# Patient Record
Sex: MALE | Race: WHITE | NOT HISPANIC OR LATINO | Employment: FULL TIME | URBAN - METROPOLITAN AREA
[De-identification: names, ages, dates, MRNs, and addresses within clinical notes are randomized per-mention and may not be internally consistent; named-entity substitution may affect disease eponyms.]

---

## 2017-01-26 ENCOUNTER — OFFICE VISIT (OUTPATIENT)
Dept: FAMILY MEDICINE | Facility: CLINIC | Age: 27
End: 2017-01-26
Payer: COMMERCIAL

## 2017-01-26 VITALS
TEMPERATURE: 98.4 F | HEART RATE: 78 BPM | SYSTOLIC BLOOD PRESSURE: 137 MMHG | BODY MASS INDEX: 28.31 KG/M2 | WEIGHT: 209 LBS | HEIGHT: 72 IN | DIASTOLIC BLOOD PRESSURE: 82 MMHG | OXYGEN SATURATION: 98 %

## 2017-01-26 DIAGNOSIS — F41.8 DEPRESSION WITH ANXIETY: Primary | ICD-10-CM

## 2017-01-26 DIAGNOSIS — R04.0 EPISTAXIS: ICD-10-CM

## 2017-01-26 PROCEDURE — 99213 OFFICE O/P EST LOW 20 MIN: CPT | Performed by: FAMILY MEDICINE

## 2017-01-26 RX ORDER — BUPROPION HYDROCHLORIDE 150 MG/1
TABLET ORAL
Qty: 53 TABLET | Refills: 1 | Status: SHIPPED | OUTPATIENT
Start: 2017-01-26 | End: 2017-03-23 | Stop reason: DRUGHIGH

## 2017-01-26 RX ORDER — ESCITALOPRAM OXALATE 20 MG/1
20 TABLET ORAL DAILY
Qty: 90 TABLET | Status: CANCELLED | OUTPATIENT
Start: 2017-01-26

## 2017-01-26 NOTE — NURSING NOTE
Chief Complaint   Patient presents with     Depression     follow up     Nose Problem     right nostril feels plugged and then bleeds from scab      /82 mmHg  Pulse 78  Temp(Src) 98.4  F (36.9  C) (Oral)  Ht 6' (1.829 m)  Wt 209 lb (94.802 kg)  BMI 28.34 kg/m2  SpO2 98% Estimated body mass index is 28.34 kg/(m^2) as calculated from the following:    Height as of this encounter: 6' (1.829 m).    Weight as of this encounter: 209 lb (94.802 kg).  BP completed using cuff size: large       Health Maintenance due pending provider review:  PHQ9    PHQ/ACT/JELANI--Gave pt questionnare      Constance Gay CMA

## 2017-01-26 NOTE — PROGRESS NOTES
"Subjective:   Adam is a 26 year old male with past medical history of depression with anxiety who presents to clinic today to discuss medication options for depression and discuss recurrent bleeding from his right nostril.     Adam was previously taking escitalopram 2015 through 2016. He stopped taking the escitalopram 5 months ago. Reasons for stopping was that he didn't feel motivated to return for a refill. Over the holidays he started to have worsening depression and mood. He has been feeling \"more down\" lately and would like to get back on medication for depression. Today his PHQ9 score is 9. He did not like the sexual dysfunction side effects of escitalopram and felt it made his mood \"dull.\" Would like to try a different medication option besides escitalopram.  He had tried working with a therapist in the past, but stopped going because he did not feel a connection. He is interested in trying therapy again in the spring. No suicidal ideation or thoughts of harming himself.     Adam also has had on and off bleeding from his right nostril for the past 2 years. In 2015 he was hit in the nose with a basketball. Did not notice any fracture of nose, but has had recurrent bleeding since then. The bleeding is worse in the winter/dry weather. The bleeding will last for approx one minute and then seems to scab over and stop bleeding. Blowing his nose will then cause bleeding. No bleeding from the left nostril. Often has metallic taste in the back of his throat but has not noticed blood. Less frequent bleeding in the summer.     No chills of fevers or recent illness.      Objective:  /82 mmHg  Pulse 78  Temp(Src) 98.4  F (36.9  C) (Oral)  Ht 6' (1.829 m)  Wt 209 lb (94.802 kg)  BMI 28.34 kg/m2  SpO2 98%   Physical exam:   General: alert, in no apparent distress  Head: atraumatic  Eyes: extraocular muscles intact. Pupil equal, round, and reactive to light  Ears: canals clear, TM pearly grey and intact. " "No drainage  Nose: no discharge, no scabs. No visibly bleeding areas.   Throat: no oral lesions, non erythematous. No blood.       Assessment/Plan:   1. Depression   Patient has history of depression previously treated with escitalopram, although he has not taken it in 5 months. Recently over the holidays he has felt his mood is down. The escitalopram seemed to help his moods, however he did not like the sexual dysfunction side effects or feeling \"dull.\" He is interested in trying bupropion to increase his moods. No suicidal ideation. PHQ9 score today 9. Feels his motivation is also decreased. Patient is interested in starting therapy later in the spring (he has a trip planned for this spring).   - bupropion 150 mg once a day for one week, then increase to 1500 mg twice daily  - see back in one month to assess bupropion on mood  - information on therapists     2. Recurrent epistaxis of right nostril   Two year history of recurrent bleeding from right nostril following injury during a basketball game. No issues with left nostril. Bleeding worse in winter and dry weather. Less bleeding in the summer. Bleeding lasts only a minute then seems to scab over. No history of fracture to nose. Patient will try applying anti-biotic ointment to affected area during winter until spring to help that area heal. He will also try a humidifier in his room at night.   - anti-bacterial ointment applied to nose during this winter until spring  - humidifier in room at night   - referral to ENT if needed        This note is scribed by Fox Ceron MS3 on behalf of Dr. Eddy.     The medical student has acted as my scribe.  I have completed all components of the history, physical exam and assessment and plan and agree with the note as documented.    "

## 2017-01-27 ASSESSMENT — PATIENT HEALTH QUESTIONNAIRE - PHQ9: SUM OF ALL RESPONSES TO PHQ QUESTIONS 1-9: 9

## 2017-03-23 ENCOUNTER — OFFICE VISIT (OUTPATIENT)
Dept: FAMILY MEDICINE | Facility: CLINIC | Age: 27
End: 2017-03-23
Payer: COMMERCIAL

## 2017-03-23 VITALS
BODY MASS INDEX: 27.77 KG/M2 | HEART RATE: 66 BPM | HEIGHT: 72 IN | SYSTOLIC BLOOD PRESSURE: 140 MMHG | OXYGEN SATURATION: 98 % | WEIGHT: 205 LBS | TEMPERATURE: 98 F | RESPIRATION RATE: 14 BRPM | DIASTOLIC BLOOD PRESSURE: 84 MMHG

## 2017-03-23 DIAGNOSIS — J40 BRONCHITIS: ICD-10-CM

## 2017-03-23 DIAGNOSIS — R04.0 EPISTAXIS: ICD-10-CM

## 2017-03-23 DIAGNOSIS — F41.8 DEPRESSION WITH ANXIETY: Primary | ICD-10-CM

## 2017-03-23 PROCEDURE — 99214 OFFICE O/P EST MOD 30 MIN: CPT | Performed by: FAMILY MEDICINE

## 2017-03-23 RX ORDER — ALBUTEROL SULFATE 90 UG/1
2 AEROSOL, METERED RESPIRATORY (INHALATION) EVERY 6 HOURS PRN
Qty: 1 INHALER | Refills: 0 | Status: SHIPPED | OUTPATIENT
Start: 2017-03-23 | End: 2018-07-20

## 2017-03-23 RX ORDER — BUPROPION HYDROCHLORIDE 150 MG/1
TABLET ORAL
Qty: 53 TABLET | Refills: 1 | Status: CANCELLED | OUTPATIENT
Start: 2017-03-23

## 2017-03-23 RX ORDER — BUPROPION HYDROCHLORIDE 300 MG/1
300 TABLET ORAL EVERY MORNING
Qty: 90 TABLET | Refills: 3 | Status: SHIPPED | OUTPATIENT
Start: 2017-03-23 | End: 2018-03-19

## 2017-03-23 RX ORDER — TRAZODONE HYDROCHLORIDE 50 MG/1
TABLET, FILM COATED ORAL
Qty: 90 TABLET | Refills: 3 | Status: SHIPPED | OUTPATIENT
Start: 2017-03-23 | End: 2018-03-29

## 2017-03-23 ASSESSMENT — ANXIETY QUESTIONNAIRES
3. WORRYING TOO MUCH ABOUT DIFFERENT THINGS: MORE THAN HALF THE DAYS
GAD7 TOTAL SCORE: 12
1. FEELING NERVOUS, ANXIOUS, OR ON EDGE: MORE THAN HALF THE DAYS
6. BECOMING EASILY ANNOYED OR IRRITABLE: MORE THAN HALF THE DAYS
5. BEING SO RESTLESS THAT IT IS HARD TO SIT STILL: NOT AT ALL
2. NOT BEING ABLE TO STOP OR CONTROL WORRYING: MORE THAN HALF THE DAYS
7. FEELING AFRAID AS IF SOMETHING AWFUL MIGHT HAPPEN: SEVERAL DAYS
IF YOU CHECKED OFF ANY PROBLEMS ON THIS QUESTIONNAIRE, HOW DIFFICULT HAVE THESE PROBLEMS MADE IT FOR YOU TO DO YOUR WORK, TAKE CARE OF THINGS AT HOME, OR GET ALONG WITH OTHER PEOPLE: SOMEWHAT DIFFICULT

## 2017-03-23 ASSESSMENT — PATIENT HEALTH QUESTIONNAIRE - PHQ9: 5. POOR APPETITE OR OVEREATING: NEARLY EVERY DAY

## 2017-03-23 NOTE — PROGRESS NOTES
Chief Complaint   Patient presents with     Recheck Medication     Wellbutrin-refills     Cough     x1 month-chest and nasal congestion-     Nose Problem     sore in right nostril continuing     Subjective:several concerns. See notes about the  Wellbutrin. He definitely feels that it has helped some but it hasn't really taken them back to normal. He says that occasionally he smokes marijuana and when he does that he feels normal for a while but he doesn't feel really sharp the next day. He doesn't only occasionally. He got more benefit from Lexapro but didn't like the side effects. Is also not sleeping well, often just because he stays up too late,, and he always feels worse if he doesn't get good sleep. He also is having more trouble with the nosebleeds and he is figured out the spot where it's coming from and there is a scab there now and he would like it cauterized. Lastly, for the last month he has been having a lot of congestion and wheezing, never had a diagnosis of asthma, worried because he is heading off to [place] for 3 weeks and wants to have this cleared up. The mucus when it does come out is just clear. It's a little worse when he exerts himself.    Objective: Normal thought processes and range of affect, see PHQ 9 which is improved I believe. In the right nasal septum there is an obvious scabbed over area which is irritated around it. I cauterized it with silver nitrate sticks several times. It didn't bleed. His lungs are clear but with any forced expiration he has a little bit of wheezing. ENT is otherwise normal    Assessment and plan: Follow-up of depression with anxiety, somewhat improved. We talked about a plan where we will try having him add trazodone at night and see if that improves things. If that doesn't improve things the alternative would be to add a very small dose may be 5 mg of Lexapro to the Wellbutrin at least taking. Will change the Wellbutrin to 300 mg pill. That way he only has to  take 1. I gave him an albuterol inhaler to experiment with and see if that helps and hopefully this will run its course. It may get better when the weather improves. He could just be a lingering effect from a cold. I don't think I would call it asthma at this point. He'll see what happens from the cautery. If it continues we could try again because it's quite accessible.    Over 25 min was spent with pt, and over half was in counseling

## 2017-03-23 NOTE — MR AVS SNAPSHOT
"              After Visit Summary   3/23/2017    Adam Fish    MRN: [id number]           Patient Information     Date Of Birth          1990        Visit Information        Provider Department      3/23/2017 1:30 PM Clyde Eddy MD [facility]        Today's Diagnoses     Depression with anxiety    -  1    Bronchitis        Epistaxis           Follow-ups after your visit        Who to contact     If you have questions or need follow up information about today's clinic visit or your schedule please contact [facility] directly at [phone].  Normal or non-critical lab and imaging results will be communicated to you by LoraxAghart, letter or phone within 4 business days after the clinic has received the results. If you do not hear from us within 7 days, please contact the clinic through VivaRayt or phone. If you have a critical or abnormal lab result, we will notify you by phone as soon as possible.  Submit refill requests through [employer] or call your pharmacy and they will forward the refill request to us. Please allow 3 business days for your refill to be completed.          Additional Information About Your Visit        MyChart Information     [employer] lets you send messages to your doctor, view your test results, renew your prescriptions, schedule appointments and more. To sign up, go to [email or web address] . Click on \"Log in\" on the left side of the screen, which will take you to the Welcome page. Then click on \"Sign up Now\" on the right side of the page.     You will be asked to enter the access code listed below, as well as some personal information. Please follow the directions to create your username and password.     Your access code is: [email or web address]  Expires: 2017  2:08 PM     Your access code will  in 90 days. If you need help or a new code, please call your [place] clinic or [phone].        Care EveryWhere ID     This is your Care EveryWhere ID. " This could be used by other organizations to access your [place] medical records  [id number]        Your Vitals Were     Pulse Temperature Respirations Height Pulse Oximetry BMI (Body Mass Index)    66 98  F (36.7  C) (Oral) 14 6' (1.829 m) 98% 27.8 kg/m2       Blood Pressure from Last 3 Encounters:   03/23/17 140/84   01/26/17 137/82   12/21/15 122/68    Weight from Last 3 Encounters:   03/23/17 205 lb (93 kg)   01/26/17 209 lb (94.8 kg)   12/21/15 204 lb (92.5 kg)              We Performed the Following     CTRL nosebleed, anter, simple          Today's Medication Changes          These changes are accurate as of: 3/23/17  2:08 PM.  If you have any questions, ask your nurse or doctor.               Start taking these medicines.        Dose/Directions    albuterol 108 (90 BASE) MCG/ACT Inhaler   Commonly known as:  PROAIR HFA/PROVENTIL HFA/VENTOLIN HFA   Used for:  Bronchitis   Started by:  Clyde Eddy MD        Dose:  2 puff   Inhale 2 puffs into the lungs every 6 hours as needed for shortness of breath / dyspnea or wheezing   Quantity:  1 Inhaler   Refills:  0       traZODone 50 MG tablet   Commonly known as:  DESYREL   Used for:  Depression with anxiety   Started by:  Clyde Eddy MD        1-3 at hs prn sleep   Quantity:  90 tablet   Refills:  3         These medicines have changed or have updated prescriptions.        Dose/Directions    buPROPion 300 MG 24 hr tablet   Commonly known as:  WELLBUTRIN XL   This may have changed:    - medication strength  - how much to take  - how to take this  - when to take this  - additional instructions   Used for:  Depression with anxiety   Changed by:  Clyde Eddy MD        Dose:  300 mg   Take 1 tablet (300 mg) by mouth every morning   Quantity:  90 tablet   Refills:  3            Where to get your medicines      These medications were sent to [facility]/pharmacy #[id number] - [place], [place] - [address] [place] [address]      Phone:  [phone]     albuterol 108 (90 BASE) MCG/ACT Inhaler    buPROPion 300 MG 24 hr tablet    traZODone 50 MG tablet                Primary Care Provider Office Phone # Fax #    Clyde Eddy [phone]       [facility] [address]  [facility] [address]        Thank you!     Thank you for choosing [facility]  for your care. Our goal is always to provide you with excellent care. Hearing back from our patients is one way we can continue to improve our services. Please take a few minutes to complete the written survey that you may receive in the mail after your visit with us. Thank you!             Your Updated Medication List - Protect others around you: Learn how to safely use, store and throw away your medicines at www.disposemymeds.org.          This list is accurate as of: 3/23/17  2:08 PM.  Always use your most recent med list.                   Brand Name Dispense Instructions for use    albuterol 108 (90 BASE) MCG/ACT Inhaler    PROAIR HFA/PROVENTIL HFA/VENTOLIN HFA    1 Inhaler    Inhale 2 puffs into the lungs every 6 hours as needed for shortness of breath / dyspnea or wheezing       buPROPion 300 MG 24 hr tablet    WELLBUTRIN XL    90 tablet    Take 1 tablet (300 mg) by mouth every morning       traZODone 50 MG tablet    DESYREL    90 tablet    1-3 at hs prn sleep

## 2017-03-23 NOTE — NURSING NOTE
Chief Complaint   Patient presents with     Recheck Medication     Wellbutrin-refills     Cough     x1 month-chest and nasal congestion-     Nose Problem     sore in right nostril continuing       Initial /84 (BP Location: Right arm, Patient Position: Chair, Cuff Size: Adult Regular)  Pulse 66  Temp 98  F (36.7  C) (Oral)  Resp 14  Ht 6' (1.829 m)  Wt 205 lb (93 kg)  SpO2 98%  BMI 27.8 kg/m2 Estimated body mass index is 27.8 kg/(m^2) as calculated from the following:    Height as of this encounter: 6' (1.829 m).    Weight as of this encounter: 205 lb (93 kg).  BP completed using cuff size: regular    Health Maintenance that is potentially due pending provider review:  BP was high, used pink card, recheck manually and There are no preventive care reminders to display for this patient.      n/a

## 2017-03-24 ASSESSMENT — ANXIETY QUESTIONNAIRES: GAD7 TOTAL SCORE: 12

## 2017-03-24 ASSESSMENT — PATIENT HEALTH QUESTIONNAIRE - PHQ9: SUM OF ALL RESPONSES TO PHQ QUESTIONS 1-9: 13

## 2018-03-18 DIAGNOSIS — F41.8 DEPRESSION WITH ANXIETY: ICD-10-CM

## 2018-03-19 RX ORDER — BUPROPION HYDROCHLORIDE 300 MG/1
TABLET ORAL
Start: 2018-03-19

## 2018-03-19 NOTE — TELEPHONE ENCOUNTER
Former patient of [facility].  Needs to establish care with a new provider.  Was scheduled for acute visit at [facility] 12/12/17 - No Show.   left asking patient to call clinic.  Candi Gil RN

## 2018-03-22 NOTE — TELEPHONE ENCOUNTER
CHERYLE LÓPEZ:  Patient scheduled to see you 3/29/2018 for acute issue looks like  Not sure if patient establishing with you at that time too?  Please advise on refill at appt if pt comes  See below - pt no showed 12/12/2017

## 2018-03-23 RX ORDER — BUPROPION HYDROCHLORIDE 300 MG/1
300 TABLET ORAL EVERY MORNING
Qty: 30 TABLET | Refills: 0 | Status: SHIPPED | OUTPATIENT
Start: 2018-03-23 | End: 2018-03-29

## 2018-03-29 ENCOUNTER — OFFICE VISIT (OUTPATIENT)
Dept: FAMILY MEDICINE | Facility: CLINIC | Age: 28
End: 2018-03-29
Payer: COMMERCIAL

## 2018-03-29 VITALS
TEMPERATURE: 98.6 F | HEIGHT: 72 IN | WEIGHT: 201.8 LBS | HEART RATE: 85 BPM | OXYGEN SATURATION: 98 % | DIASTOLIC BLOOD PRESSURE: 81 MMHG | SYSTOLIC BLOOD PRESSURE: 129 MMHG | BODY MASS INDEX: 27.33 KG/M2

## 2018-03-29 DIAGNOSIS — J32.9 CHRONIC SINUSITIS, UNSPECIFIED LOCATION: ICD-10-CM

## 2018-03-29 DIAGNOSIS — M75.41 IMPINGEMENT SYNDROME OF RIGHT SHOULDER: Primary | ICD-10-CM

## 2018-03-29 DIAGNOSIS — F41.8 DEPRESSION WITH ANXIETY: ICD-10-CM

## 2018-03-29 PROCEDURE — 99214 OFFICE O/P EST MOD 30 MIN: CPT | Performed by: FAMILY MEDICINE

## 2018-03-29 RX ORDER — BUPROPION HYDROCHLORIDE 300 MG/1
300 TABLET ORAL EVERY MORNING
Qty: 90 TABLET | Refills: 3 | Status: ON HOLD | OUTPATIENT
Start: 2018-03-29 | End: 2019-02-04

## 2018-03-29 RX ORDER — BUPROPION HYDROCHLORIDE 300 MG/1
300 TABLET ORAL EVERY MORNING
Qty: 30 TABLET | Refills: 0 | Status: SHIPPED | OUTPATIENT
Start: 2018-03-29 | End: 2018-03-29

## 2018-03-29 RX ORDER — FLUTICASONE PROPIONATE 50 MCG
1-2 SPRAY, SUSPENSION (ML) NASAL DAILY
Qty: 1 BOTTLE | Refills: 11 | Status: ON HOLD | OUTPATIENT
Start: 2018-03-29 | End: 2019-02-04

## 2018-03-29 RX ORDER — TRAZODONE HYDROCHLORIDE 50 MG/1
TABLET, FILM COATED ORAL
Qty: 90 TABLET | Refills: 3 | Status: SHIPPED | OUTPATIENT
Start: 2018-03-29 | End: 2018-07-30

## 2018-03-29 NOTE — NURSING NOTE
Chief Complaint   Patient presents with     Shoulder Pain     RT shoulder      Allergies       Initial /81  Pulse 85  Temp 98.6  F (37  C) (Oral)  Ht 6' (1.829 m)  Wt 201 lb 12.8 oz (91.5 kg)  SpO2 98%  BMI 27.37 kg/m2 Estimated body mass index is 27.37 kg/(m^2) as calculated from the following:    Height as of this encounter: 6' (1.829 m).    Weight as of this encounter: 201 lb 12.8 oz (91.5 kg).  Medication Reconciliation: complete      Health Maintenance that is potentially due pending provider review:  PHQ9    Completing PHQ9 today.    LATA López

## 2018-03-29 NOTE — MR AVS SNAPSHOT
After Visit Summary   3/29/2018    Adam Fish    MRN: [id number]           Patient Information     Date Of Birth          1990        Visit Information        Provider Department      3/29/2018 3:00 PM Jayesh Brown MD [facility]        Today's Diagnoses     Impingement syndrome of right shoulder    -  1    Chronic sinusitis, unspecified location        Depression with anxiety           Follow-ups after your visit        Additional Services     MENTAL HEALTH REFERRAL  - Adult; Outpatient Treatment; Individual/Couples/Family/Group Therapy/Health Psychology; [facility]: [facility] [phone]; We will contact you to schedule the appointment or please call with any questions       All scheduling is subject to the client's specific insurance plan & benefits, provider/location availability, and provider clinical specialities.  Please arrive 15 minutes early for your first appointment and bring your completed paperwork.    Please be aware that coverage of these services is subject to the terms and limitations of your health insurance plan.  Call member services at your health plan with any benefit or coverage questions.                      ORTHO  REFERRAL       [facility] is referring you to the Orthopedic  Services at [facility].       The  Representative will assist you in the coordination of your Orthopedic and Musculoskeletal Care as prescribed by your physician.    The  Representative will call you within 1 business day to help schedule your appointment, or you may contact the  Representative at:    All areas ~ [phone]     Type of Referral : Surgical / Specialist       Timeframe requested: 3 - 5 days    Coverage of these services is subject to the terms and limitations of your health insurance plan.  Please call member services at your health plan with any benefit or  coverage questions.      If X-rays, CT or MRI's have been performed, please contact the facility where they were done to arrange for , prior to your scheduled appointment.  Please bring this referral request to your appointment and present it to your specialist.            OTOLARYNGOLOGY REFERRAL       Your provider has referred you to: [facility]: [facility] and [facility] [phone]   [email or web address]    Please be aware that coverage of these services is subject to the terms and limitations of your health insurance plan.  Call member services at your health plan with any benefit or coverage questions.      Please bring the following with you to your appointment:    (1) Any X-Rays, CTs or MRIs which have been performed.  Contact the facility where they were done to arrange for  prior to your scheduled appointment.   (2) List of current medications  (3) This referral request   (4) Any documents/labs given to you for this referral                  Who to contact     If you have questions or need follow up information about today's clinic visit or your schedule please contact [facility] directly at [phone].  Normal or non-critical lab and imaging results will be communicated to you by MyChart, letter or phone within 4 business days after the clinic has received the results. If you do not hear from us within 7 days, please contact the clinic through Volpithart or phone. If you have a critical or abnormal lab result, we will notify you by phone as soon as possible.  Submit refill requests through [employer] or call your pharmacy and they will forward the refill request to us. Please allow 3 business days for your refill to be completed.          Additional Information About Your Visit        VolpitharSyandus Information     [employer] lets you send messages to your doctor, view your test results, renew your prescriptions, schedule appointments and more. To sign up, go to  "[email or web address] . Click on \"Log in\" on the left side of the screen, which will take you to the Welcome page. Then click on \"Sign up Now\" on the right side of the page.     You will be asked to enter the access code listed below, as well as some personal information. Please follow the directions to create your username and password.     Your access code is: [email or web address]  Expires: 2018  3:26 PM     Your access code will  in 90 days. If you need help or a new code, please call your [place] clinic or [phone].        Care EveryWhere ID     This is your Care EveryWhere ID. This could be used by other organizations to access your [place] medical records  [id number]        Your Vitals Were     Pulse Temperature Height Pulse Oximetry BMI (Body Mass Index)       85 98.6  F (37  C) (Oral) 6' (1.829 m) 98% 27.37 kg/m2        Blood Pressure from Last 3 Encounters:   18 129/81   17 140/84   17 137/82    Weight from Last 3 Encounters:   18 201 lb 12.8 oz (91.5 kg)   17 205 lb (93 kg)   17 209 lb (94.8 kg)              We Performed the Following     MENTAL HEALTH REFERRAL  - Adult; Outpatient Treatment; Individual/Couples/Family/Group Therapy/Health Psychology; [facility] [phone]; We will contact you to schedule the appointment or please call with any questions     ORTHO  REFERRAL     OTOLARYNGOLOGY REFERRAL          Today's Medication Changes          These changes are accurate as of 3/29/18  3:26 PM.  If you have any questions, ask your nurse or doctor.               Start taking these medicines.        Dose/Directions    buPROPion 300 MG 24 hr tablet   Commonly known as:  WELLBUTRIN XL   Used for:  Depression with anxiety   Started by:  Jayesh Brown MD        Dose:  300 mg   Take 1 tablet (300 mg) by mouth every morning   Quantity:  90 tablet   Refills:  3       fluticasone 50 MCG/ACT spray   Commonly known as:  " FLONASE   Used for:  Chronic sinusitis, unspecified location   Started by:  Jayesh Brown MD        Dose:  1-2 spray   Spray 1-2 sprays into both nostrils daily   Quantity:  1 Bottle   Refills:  11            Where to get your medicines      These medications were sent to [id number] - [place], [place] - [address]  [address] [facility] [address]     Phone:  [phone]     buPROPion 300 MG 24 hr tablet    fluticasone 50 MCG/ACT spray    traZODone 50 MG tablet                Primary Care Provider Office Phone # Fax #    Jayesh Stewart Brown [phone] [address] [facility] [address]        Equal Access to Services     KARLA WONG : Hadii taty carrlolo Sogeovani, waaxda luqadaha, qaybta kaalmada adeegyada, waxay jesse gonzalez . So [facility] [phone].    ATENCIÓN: Si habla español, tiene a maguire disposición servicios gratuitos de asistencia lingüística. [facility] [phone].    We comply with applicable federal civil rights laws and [place] laws. We do not discriminate on the basis of race, color, national origin, age, disability, sex, sexual orientation, or gender identity.            Thank you!     Thank you for choosing [facility]  for your care. Our goal is always to provide you with excellent care. Hearing back from our patients is one way we can continue to improve our services. Please take a few minutes to complete the written survey that you may receive in the mail after your visit with us. Thank you!             Your Updated Medication List - Protect others around you: Learn how to safely use, store and throw away your medicines at www.disposemymeds.org.          This list is accurate as of 3/29/18  3:26 PM.  Always use your most recent med list.                   Brand Name Dispense Instructions for use Diagnosis    albuterol 108 (90 BASE) MCG/ACT Inhaler    PROAIR HFA/PROVENTIL HFA/VENTOLIN HFA     1 Inhaler    Inhale 2 puffs into the lungs every 6 hours as needed for shortness of breath / dyspnea or wheezing    Bronchitis       buPROPion 300 MG 24 hr tablet    WELLBUTRIN XL    90 tablet    Take 1 tablet (300 mg) by mouth every morning    Depression with anxiety       fluticasone 50 MCG/ACT spray    FLONASE    1 Bottle    Spray 1-2 sprays into both nostrils daily    Chronic sinusitis, unspecified location       traZODone 50 MG tablet    DESYREL    90 tablet    1-3 at hs prn sleep    Depression with anxiety

## 2018-03-29 NOTE — PROGRESS NOTES
SUBJECTIVE:   Adam Fish is a 28 year old male who presents to clinic today for the following health issues:      Musculoskeletal problem/pain      Duration: on and off x6-8 years     Description  Location: RT shoulder     Intensity:  Mild to severe     Accompanying signs and symptoms: sharp pain, dull ache, radiation of pain to mid upper back-LT side     History  Previous similar problem: no   Previous evaluation:  none    Precipitating or alleviating factors:  Trauma or overuse: YES- snowboarding and baseball - evidence of scar tissue   Aggravating factors include: overuse and playing sports    Therapies tried and outcome: physical therapy - didn't help with sx       ALLERGIES      Duration: on and off x2 years    Description:   Nasal congestion: YES - scabbing in nose, irritation   Sneezing: YES  Red, itchy eyes: no     Accompanying signs and symptoms: none     History (similar episodes/allergy testing): None    Precipitating or alleviating factors: flare up in allergies more at work     Therapies tried and outcome: None      Trend of symptoms: worsening  Denies These symptoms: fever, ear pain, myalgia  History of: recurrent nasal pain and bleeding    Review of symptoms except as noted in the HPI is otherwise negative.    MEDICATIONS  Current Outpatient Prescriptions   Medication Sig Dispense Refill     buPROPion (WELLBUTRIN XL) 300 MG 24 hr tablet Take 1 tablet (300 mg) by mouth every morning 30 tablet 0     traZODone (DESYREL) 50 MG tablet 1-3 at hs prn sleep 90 tablet 3     albuterol (PROAIR HFA/PROVENTIL HFA/VENTOLIN HFA) 108 (90 BASE) MCG/ACT Inhaler Inhale 2 puffs into the lungs every 6 hours as needed for shortness of breath / dyspnea or wheezing 1 Inhaler 0     Allergies:    Allergies   Allergen Reactions     No Known Drug Allergies        SOCIAL   reports that he has never smoked. He has never used smokeless tobacco.    OBJECTIVE:  /81  Pulse 85  Temp 98.6  F (37  C) (Oral)  Ht 6' (1.829  m)  Wt 201 lb 12.8 oz (91.5 kg)  SpO2 98%  BMI 27.37 kg/m2  General appearance: healthy, alert and cooperative.  Nose: mucosal erythema, sore in R nare  Sinuses: maxillary tenderness bilaterally  Oropharynx: normal pharynx and buccal mucosa. Dental hygeine adequate.  MS: Normal range of motion both shoulders but some subjective pain in the right with certain maneuvers    GROOMING: Adequately groomed.  ATTIRE: Appropriate.  AGE: Appears stated.  BEHAVIOR TOWARDS EXAMINER: Cooperative.  MOTOR ACTIVITY: Unremarkable.  EYE CONTACT: Appropriate.  Mood:  depressed  Affect:  appropriate and in normal range  SPEECH/LANGUAGE: Unremarkable.  ATTENTION: Unremarkable.  CONCENTRATION: Unremarkable.  THOUGHT PROCESS: Unremarkable  THOUGHT CONTENT: Unremarkable for hallucinations and delusions.  ORIENTATION: Times 3.  MEMORY: No evidence of impairment.  JUDGMENT: No evidence of impairment.  ESTIMATED INTELLIGENCE: Average.  DEMONSTRATED INSIGHT: Adequate.  FUND OF KNOWLEDGE: Adequate.  SUICIDE RISK: None apparent and denied.        ASSESSMENT/PLAN:    ICD-10-CM    1. Impingement syndrome of right shoulder M75.41 ORTHO  REFERRAL   2. Chronic sinusitis, unspecified location J32.9 fluticasone (FLONASE) 50 MCG/ACT spray     OTOLARYNGOLOGY REFERRAL   3. Depression with anxiety F41.8 buPROPion (WELLBUTRIN XL) 300 MG 24 hr tablet     traZODone (DESYREL) 50 MG tablet     MENTAL HEALTH REFERRAL  - Adult; Outpatient Treatment; Individual/Couples/Family/Group Therapy/Health Psychology; [facility] [place] [facility]: [facility] [phone]; We will contact you to schedule the appointment or please call with any questions   Previous diagnosis of shoulder impingement  Patient has tried therapy in other modalities and did not really find it all that helpful  I discussed with him referral to orthopedics for consideration of MRI and surgery if necessary    Chronic nonhealing sore of the right nare and some chronic sinus congestion which  sounds allergic  Try Flonase taking care to avoid the area with skin breakdown as this can likely make it worse  I recommend coating it with Vaseline or other M permeable substance    Follow-up with ENT if this does not work    Patient continues to have depression with anxiety and feels like the trazodone at night and bupropion during the day is very helpful but does not take care of all of his symptoms  Thinks additional medication unlikely to be helpful so we discussed therapy  referred    Call or return to clinic if these symptoms worsen or fail to improve as anticipated.  Jayesh Brown MD MPH

## 2018-03-30 ASSESSMENT — PATIENT HEALTH QUESTIONNAIRE - PHQ9: SUM OF ALL RESPONSES TO PHQ QUESTIONS 1-9: 11

## 2018-04-12 ENCOUNTER — TRANSFERRED RECORDS (OUTPATIENT)
Dept: HEALTH INFORMATION MANAGEMENT | Facility: CLINIC | Age: 28
End: 2018-04-12

## 2018-04-26 ENCOUNTER — TRANSFERRED RECORDS (OUTPATIENT)
Dept: HEALTH INFORMATION MANAGEMENT | Facility: CLINIC | Age: 28
End: 2018-04-26

## 2018-07-20 ENCOUNTER — OFFICE VISIT (OUTPATIENT)
Dept: FAMILY MEDICINE | Facility: CLINIC | Age: 28
End: 2018-07-20
Payer: COMMERCIAL

## 2018-07-20 VITALS
TEMPERATURE: 98.7 F | HEART RATE: 62 BPM | SYSTOLIC BLOOD PRESSURE: 137 MMHG | WEIGHT: 209.6 LBS | HEIGHT: 72 IN | DIASTOLIC BLOOD PRESSURE: 83 MMHG | BODY MASS INDEX: 28.39 KG/M2 | OXYGEN SATURATION: 98 %

## 2018-07-20 DIAGNOSIS — S43.431A LABRAL TEAR OF SHOULDER, RIGHT, INITIAL ENCOUNTER: ICD-10-CM

## 2018-07-20 DIAGNOSIS — Z01.818 PREOP GENERAL PHYSICAL EXAM: Primary | ICD-10-CM

## 2018-07-20 PROCEDURE — 99214 OFFICE O/P EST MOD 30 MIN: CPT | Performed by: FAMILY MEDICINE

## 2018-07-20 NOTE — MR AVS SNAPSHOT
After Visit Summary   7/20/2018    Adam Fish    MRN: [id number]           Patient Information     Date Of Birth          1990        Visit Information        Provider Department      7/20/2018 11:30 AM Jayesh Brown MD [facility]        Today's Diagnoses     Preop general physical exam    -  1    Labral tear of shoulder, right, initial encounter          Care Instructions      Before Your Surgery      Call your surgeon if there is any change in your health. This includes signs of a cold or flu (such as a sore throat, runny nose, cough, rash or fever).    Do not smoke, drink alcohol or take over the counter medicine (unless your surgeon or primary care doctor tells you to) for the 24 hours before and after surgery.    If you take prescribed drugs: Follow your doctor s orders about which medicines to take and which to stop until after surgery.    Eating and drinking prior to surgery: follow the instructions from your surgeon    Take a shower or bath the night before surgery. Use the soap your surgeon gave you to gently clean your skin. If you do not have soap from your surgeon, use your regular soap. Do not shave or scrub the surgery site.  Wear clean pajamas and have clean sheets on your bed.           Follow-ups after your visit        Follow-up notes from your care team     Return if symptoms worsen or fail to improve.      Who to contact     If you have questions or need follow up information about today's clinic visit or your schedule please contact [facility] directly at [phone].  Normal or non-critical lab and imaging results will be communicated to you by MyChart, letter or phone within 4 business days after the clinic has received the results. If you do not hear from us within 7 days, please contact the clinic through MyChart or phone. If you have a critical or abnormal lab result, we will notify you by phone as soon as possible.  Submit refill  "requests through [employer] or call your pharmacy and they will forward the refill request to us. Please allow 3 business days for your refill to be completed.          Additional Information About Your Visit        H[employer]el[employer] Information     [employer] lets you send messages to your doctor, view your test results, renew your prescriptions, schedule appointments and more. To sign up, go to [email or web address] . Click on \"Log in\" on the left side of the screen, which will take you to the Welcome page. Then click on \"Sign up Now\" on the right side of the page.     You will be asked to enter the access code listed below, as well as some personal information. Please follow the directions to create your username and password.     Your access code is: [email or web address]  Expires: 10/11/2018  1:36 PM     Your access code will  in 90 days. If you need help or a new code, please call your [facility] clinic or [phone].        Care EveryWhere ID     This is your Care EveryWhere ID. This could be used by other organizations to access your [facility] medical records  [id number]        Your Vitals Were     Pulse Temperature Height Pulse Oximetry BMI (Body Mass Index)       62 98.7  F (37.1  C) (Oral) 6' (1.829 m) 98% 28.43 kg/m2        Blood Pressure from Last 3 Encounters:   18 137/83   18 129/81   17 140/84    Weight from Last 3 Encounters:   18 209 lb 9.6 oz (95.1 kg)   18 201 lb 12.8 oz (91.5 kg)   17 205 lb (93 kg)              Today, you had the following     No orders found for display         Today's Medication Changes          These changes are accurate as of 18 11:58 AM.  If you have any questions, ask your nurse or doctor.               Stop taking these medicines if you haven't already. Please contact your care team if you have questions.     albuterol 108 (90 Base) MCG/ACT Inhaler   Commonly known as:  PROAIR HFA/PROVENTIL HFA/VENTOLIN HFA   Stopped by:  Jayesh Brown, " MD                    Primary Care Provider Office Phone # Fax #    Jayesh Stewart Brown [phone] [address] [facility] [address]        Equal Access to Services     KARLA WONG : Hadjarad taty jackson gleno Soomaali, waaxda luqadaha, qaybta kaalmada adeegyada, chelsea marcano laIglesiajoyce morin. So [facility] [phone].    ATENCIÓN: Si habla español, tiene a maguire disposición servicios gratuitos de asistencia lingüística. Llame al [phone].    We comply with applicable federal civil rights laws and [place] laws. We do not discriminate on the basis of race, color, national origin, age, disability, sex, sexual orientation, or gender identity.            Thank you!     Thank you for choosing [facility]  for your care. Our goal is always to provide you with excellent care. Hearing back from our patients is one way we can continue to improve our services. Please take a few minutes to complete the written survey that you may receive in the mail after your visit with us. Thank you!             Your Updated Medication List - Protect others around you: Learn how to safely use, store and throw away your medicines at www.disposemymeds.org.          This list is accurate as of 7/20/18 11:58 AM.  Always use your most recent med list.                   Brand Name Dispense Instructions for use Diagnosis    buPROPion 300 MG 24 hr tablet    WELLBUTRIN XL    90 tablet    Take 1 tablet (300 mg) by mouth every morning    Depression with anxiety       fluticasone 50 MCG/ACT spray    FLONASE    1 Bottle    Spray 1-2 sprays into both nostrils daily    Chronic sinusitis, unspecified location       traZODone 50 MG tablet    DESYREL    90 tablet    1-3 at hs prn sleep    Depression with anxiety

## 2018-07-20 NOTE — PROGRESS NOTES
[facility]  [address] [place]  [facility] [id number] [phone]  Dept: [phone]    PRE-OP EVALUATION:  Today's date: 2018    Adam Fish (: 1990) presents for pre-operative evaluation assessment as requested by Dr. Palmer.  He requires evaluation and anesthesia risk assessment prior to undergoing surgery/procedure for treatment of torn labrum.    Fax number for surgical facility: [phone]  Primary Physician: Jayesh Brown  Type of Anesthesia Anticipated: to be determined    Patient has a Health Care Directive or Living Will:  NO    Preop Questions 2018   Who is doing your surgery? gelacio palmer   What are you having done? right shoulder   Date of Surgery/Procedure: 2018   Facility or Hospital where procedure/surgery will be performed: [facility]   1.  Do you have a history of Heart attack, stroke, stent, coronary bypass surgery, or other heart surgery? No   2.  Do you ever have any pain or discomfort in your chest? No   3.  Do you have a history of  Heart Failure? No   4.   Are you troubled by shortness of breath when:  walking on a level surface, or up a slight hill, or at night? No   5.  Do you currently have a cold, bronchitis or other respiratory infection? No   6.  Do you have a cough, shortness of breath, or wheezing? No   7.  Do you sometimes get pains in the calves of your legs when you walk? No   8. Do you or anyone in your family have previous history of blood clots? No   9.  Do you or does anyone in your family have a serious bleeding problem such as prolonged bleeding following surgeries or cuts? No   10. Have you ever had problems with anemia or been told to take iron pills? No   11. Have you had any abnormal blood loss such as black, tarry or bloody stools? No   12. Have you ever had a blood transfusion? No   13. Have you or any of your relatives ever had problems with anesthesia? No   14. Do you have sleep  apnea, excessive snoring or daytime drowsiness? No   15. Do you have any prosthetic heart valves? No   16. Do you have prosthetic joints? No         HPI:     HPI related to upcoming procedure: on and off R shoulder pain for 6 years, worsening this year  Maybe a snowboarding accident, or just softball    Torn Labrum    See problem list for active medical problems.  Problems all longstanding and stable, except as noted/documented.  See ROS for pertinent symptoms related to these conditions.                                                                                                                                                          .    MEDICAL HISTORY:     Patient Active Problem List    Diagnosis Date Noted     Depression with anxiety 02/26/2015     Priority: Medium     Impingement syndrome, shoulder 05/14/2014     Priority: Medium     CARDIOVASCULAR SCREENING; LDL GOAL LESS THAN 160 10/31/2010     Priority: Medium      No past medical history on file.  No past surgical history on file.  Current Outpatient Prescriptions   Medication Sig Dispense Refill     buPROPion (WELLBUTRIN XL) 300 MG 24 hr tablet Take 1 tablet (300 mg) by mouth every morning 90 tablet 3     traZODone (DESYREL) 50 MG tablet 1-3 at hs prn sleep 90 tablet 3     albuterol (PROAIR HFA/PROVENTIL HFA/VENTOLIN HFA) 108 (90 BASE) MCG/ACT Inhaler Inhale 2 puffs into the lungs every 6 hours as needed for shortness of breath / dyspnea or wheezing 1 Inhaler 0     fluticasone (FLONASE) 50 MCG/ACT spray Spray 1-2 sprays into both nostrils daily 1 Bottle 11     OTC products: None, except as noted above    Allergies   Allergen Reactions     No Known Drug Allergies       Latex Allergy: NO    Social History   Substance Use Topics     Smoking status: Never Smoker     Smokeless tobacco: Never Used     Alcohol use 0.0 oz/week     0 Standard drinks or equivalent per week      Comment: occ.     History   Drug Use No       REVIEW OF SYSTEMS:   CONSTITUTIONAL:  NEGATIVE for fever, chills, change in weight  INTEGUMENTARY/SKIN: NEGATIVE for worrisome rashes, moles or lesions  EYES: NEGATIVE for vision changes or irritation  ENT/MOUTH: NEGATIVE for ear, mouth and throat problems  RESP: NEGATIVE for significant cough or SOB  BREAST: NEGATIVE for masses, tenderness or discharge  CV: NEGATIVE for chest pain, palpitations or peripheral edema  GI: NEGATIVE for nausea, abdominal pain, heartburn, or change in bowel habits  : NEGATIVE for frequency, dysuria, or hematuria  MUSCULOSKELETAL: NEGATIVE for significant arthralgias or myalgia  NEURO: NEGATIVE for weakness, dizziness or paresthesias  ENDOCRINE: NEGATIVE for temperature intolerance, skin/hair changes  HEME: NEGATIVE for bleeding problems  PSYCHIATRIC: NEGATIVE for changes in mood or affect    EXAM:   /83  Pulse 62  Temp 98.7  F (37.1  C) (Oral)  Ht 6' (1.829 m)  Wt 209 lb 9.6 oz (95.1 kg)  SpO2 98%  BMI 28.43 kg/m2    General Appearance: Pleasant, alert, in no acute respiratory distress.  Head Exam: Normal. Normocephalic, atraumatic. No sinus tenderness.  Eye Exam: Normal external eye, conjunctiva, lids. LILLY.  Ear Exam: Normal auditory canals and external ears. Non-tender.  Left TM-normal. Right TM-normal.  OroPharynx Exam: Dental hygiene adequate. Normal buccal mucosa. Normal pharynx.  Neck Exam: Supple, no masses or enlarged, tender nodes.  Thyroid Exam: No nodules or enlargement or tenderness.  Chest/Respiratory Exam: Normal, comfortable, easy respirations. Chest wall normal. Lungs are clear to auscultation. No wheezing, crackles, or rhonchi.  Cardiovascular Exam: Regular rate and rhythm. No murmur, gallop, or rubs. No pedal edema.  Gastrointestinal Exam: Soft, non-tender, no masses or organomegaly.  Musculoskeletal Exam: Back is non-tender, full ROM of upper and lower extremities.  Skin: no rash, warm and dry.   Neurologic Exam: Nonfocal, no tremor. Normal gait.  Psychiatric Exam: Alert - appropriate,  normal affect      DIAGNOSTICS:   EKG: Not indicated due to non-vascular surgery and low risk of event (age <65 and without cardiac risk factors)    Recent Labs   Lab Test  06/07/10   1602   HGB  15.5      IMPRESSION:   Reason for surgery/procedure: R shoulder   Diagnosis/reason for consult:     The proposed surgical procedure is considered INTERMEDIATE risk.    REVISED CARDIAC RISK INDEX  The patient has the following serious cardiovascular risks for perioperative complications such as (MI, PE, VFib and 3  AV Block):  No serious cardiac risks  INTERPRETATION: 0 risks: Class I (very low risk - 0.4% complication rate)    The patient has the following additional risks for perioperative complications:  No identified additional risks      ICD-10-CM    1. Preop general physical exam Z01.818    2. Labral tear of shoulder, right, initial encounter S43.431A        RECOMMENDATIONS:     Low risk no workup recommended    APPROVAL GIVEN to proceed with proposed procedure, without further diagnostic evaluation       Signed Electronically by: Jayesh Brown MD    Copy of this evaluation report is provided to requesting physician.    [facility] Preop Guidelines    Revised Cardiac Risk Index

## 2018-07-20 NOTE — NURSING NOTE
Chief Complaint   Patient presents with     Pre-Op Exam     /83  Pulse 62  Temp 98.7  F (37.1  C) (Oral)  Ht 6' (1.829 m)  Wt 209 lb 9.6 oz (95.1 kg)  SpO2 98%  BMI 28.43 kg/m2 Estimated body mass index is 28.43 kg/(m^2) as calculated from the following:    Height as of this encounter: 6' (1.829 m).    Weight as of this encounter: 209 lb 9.6 oz (95.1 kg).  Medication Reconciliation: complete      Health Maintenance that is potentially due pending provider review:  NONE    n/a    LATA López

## 2018-07-30 ENCOUNTER — TELEPHONE (OUTPATIENT)
Dept: FAMILY MEDICINE | Facility: CLINIC | Age: 28
End: 2018-07-30

## 2018-07-30 DIAGNOSIS — F41.8 DEPRESSION WITH ANXIETY: ICD-10-CM

## 2018-07-30 RX ORDER — TRAZODONE HYDROCHLORIDE 50 MG/1
TABLET, FILM COATED ORAL
Qty: 270 TABLET | Refills: 0 | Status: SHIPPED | OUTPATIENT
Start: 2018-07-30 | End: 2019-09-25

## 2018-07-30 NOTE — TELEPHONE ENCOUNTER
Message from [facility] pharmacy: Patient requests new rx. Insurance requires 90 day supply, only 30 days remain.  [place] [id number] [facility]# [phone]

## 2018-07-30 NOTE — TELEPHONE ENCOUNTER
MARIBEL  [facility] sent fax.  Insurance will only cover 90 day supply.  He has #90 left from 3/29/18 Rx for #90 with 3 refills (Instructions take 1-3 hs prn)    Last OV 7/20 for pre-op  Asking for Rx for #270  Please advise.  Thanks, Candi Gil RN

## 2018-08-30 ENCOUNTER — TRANSFERRED RECORDS (OUTPATIENT)
Dept: HEALTH INFORMATION MANAGEMENT | Facility: CLINIC | Age: 28
End: 2018-08-30

## 2018-10-04 ENCOUNTER — TRANSFERRED RECORDS (OUTPATIENT)
Dept: HEALTH INFORMATION MANAGEMENT | Facility: CLINIC | Age: 28
End: 2018-10-04

## 2018-11-15 ENCOUNTER — TRANSFERRED RECORDS (OUTPATIENT)
Dept: HEALTH INFORMATION MANAGEMENT | Facility: CLINIC | Age: 28
End: 2018-11-15

## 2018-12-14 ENCOUNTER — OFFICE VISIT (OUTPATIENT)
Dept: FAMILY MEDICINE | Facility: CLINIC | Age: 28
End: 2018-12-14
Payer: COMMERCIAL

## 2018-12-14 VITALS
TEMPERATURE: 98.9 F | DIASTOLIC BLOOD PRESSURE: 82 MMHG | HEART RATE: 69 BPM | WEIGHT: 217.7 LBS | SYSTOLIC BLOOD PRESSURE: 122 MMHG | OXYGEN SATURATION: 96 % | BODY MASS INDEX: 29.49 KG/M2 | HEIGHT: 72 IN

## 2018-12-14 DIAGNOSIS — R03.0 ELEVATED BP WITHOUT DIAGNOSIS OF HYPERTENSION: ICD-10-CM

## 2018-12-14 DIAGNOSIS — R04.0 EPISTAXIS: ICD-10-CM

## 2018-12-14 DIAGNOSIS — M54.41 ACUTE MIDLINE LOW BACK PAIN WITH RIGHT-SIDED SCIATICA: Primary | ICD-10-CM

## 2018-12-14 PROBLEM — I10 ESSENTIAL HYPERTENSION: Status: ACTIVE | Noted: 2018-12-14

## 2018-12-14 PROCEDURE — 99214 OFFICE O/P EST MOD 30 MIN: CPT | Performed by: FAMILY MEDICINE

## 2018-12-14 RX ORDER — PREDNISONE 20 MG/1
20 TABLET ORAL DAILY
Qty: 7 TABLET | Refills: 0 | Status: SHIPPED | OUTPATIENT
Start: 2018-12-14 | End: 2018-12-21

## 2018-12-14 ASSESSMENT — MIFFLIN-ST. JEOR: SCORE: 1990.48

## 2018-12-14 NOTE — NURSING NOTE
Chief Complaint   Patient presents with     Back Pain     BP (!) 150/94   Pulse 69   Temp 98.9  F (37.2  C) (Oral)   Ht 1.829 m (6')   Wt 98.7 kg (217 lb 11.2 oz)   SpO2 96%   BMI 29.53 kg/m   Estimated body mass index is 29.53 kg/m  as calculated from the following:    Height as of this encounter: 1.829 m (6').    Weight as of this encounter: 98.7 kg (217 lb 11.2 oz).  Medication Reconciliation: complete      Health Maintenance that is potentially due pending provider review:  PHQ9    Pt declines to complete PHQ9.    LATA López

## 2018-12-14 NOTE — PROGRESS NOTES
SUBJECTIVE:   Adam Fish is a 28 year old male who presents to clinic today for the following health issues:      Back Pain       Duration: x3 weeks, worsened in the past week         Specific cause: physical therapy, lifting    Description:   Location of pain: low back bilateral  Character of pain: sharp, dull ache and constant  Pain radiation:radiates into the right buttocks and radiates into the right leg  New numbness or weakness in legs, not attributed to pain:  no     Intensity: Currently 4/10, At its worst 8/10    History:   Pain interferes with job: YES  History of back problems: no prior back problems  Any previous MRI or X-rays: None  Sees a specialist for back pain:  No  Therapies tried without relief: heat and stretch    Alleviating factors:   Improved by: ibuprofen and cold      Precipitating factors:  Worsened by: Bending, Standing and changing positions     Has been in  PHYSICAL THERAPY  for past > many 3 months    Acute  lower back pain since moving heavy load in his basement about 3 weeks ago.  Pain is radiating down- the right hip   And beyond the right knee posterior  Has started some PHYSICAL THERAPY  Wondering about next step      Accompanying Signs & Symptoms:  Risk of Fracture:  None  Risk of Cauda Equina:  None  Risk of Infection:  None  Risk of Cancer:  None  Risk of Ankylosing Spondylitis:  Onset at age <35, male, AND morning back stiffness. {     PROBLEMS TO ADD ON...  BP Readings from Last 3 Encounters:   12/14/18 122/82   07/20/18 137/83   03/29/18 129/81       Problem list and histories reviewed & adjusted, as indicated.  Additional history: as documented    Patient Active Problem List   Diagnosis     CARDIOVASCULAR SCREENING; LDL GOAL LESS THAN 160     Impingement syndrome, shoulder     Depression with anxiety     Elevated BP without diagnosis of hypertension     Acute midline low back pain with right-sided sciatica     No past surgical history on file.    Social History      Tobacco Use     Smoking status: Never Smoker     Smokeless tobacco: Never Used   Substance Use Topics     Alcohol use: Yes     Alcohol/week: 0.0 oz     Comment: occ.     No family history on file.        Reviewed and updated as needed this visit by clinical staff  Tobacco  Allergies  Meds       Reviewed and updated as needed this visit by Provider         ROS:  Constitutional, HEENT, cardiovascular, pulmonary, gi and gu systems are negative, except as otherwise noted.    OBJECTIVE:     /82   Pulse 69   Temp 98.9  F (37.2  C) (Oral)   Ht 1.829 m (6')   Wt 98.7 kg (217 lb 11.2 oz)   SpO2 96%   BMI 29.53 kg/m    Body mass index is 29.53 kg/m .  GENERAL: healthy, alert and no distress  HENT: ear canals and TM's normal,  NOSE: anterior nares active bleeding- with some scabbed wound.    mouth without ulcers or lesions  NECK: no adenopathy, no asymmetry, masses, or scars and thyroid normal to palpation  RESP: lungs clear to auscultation - no rales, rhonchi or wheezes  CV: regular rate and rhythm, normal S1 S2, no S3 or S4, no murmur, click or rub, no peripheral edema and peripheral pulses strong  ABDOMEN: soft, nontender, no hepatosplenomegaly, no masses and bowel sounds normal  CNS: positive SLR on right strength 4/5 on right lower extremity 5/5 on the left.  PSYCH: mentation appears normal, affect normal/bright    Diagnostic Test Results:  No results found for this or any previous visit (from the past 24 hour(s)).    ASSESSMENT/PLAN:     1. Acute midline low back pain with right-sided sciatica  Plan:MRI spine. Orthopedic consultation.   Short course of prednisone 20 mg once daily for 7 days  Ok to take over the counter naprosyn 1-2 with melas up to twice daily as needed   Follow up in clinic if pain worsens  Start PHYSICAL THERAPY    cold or warm pack as needed           - [facility], [facility] CHIROPRACTIC REFERRAL; Future      - ORTHO  REFERRAL    2. Epistaxis  Plan: avoid excessive nasal  trauma/ nose picking or blowing nose harder.  Applied silver nitrate sticks and he had significant sneezing  - OTOLARYNGOLOGY REFERRAL    3. Elevated BP without diagnosis of hypertension  Plan: encouraged low salt diet and recheck BP periodically        Erinn Wellington MD  [facility]

## 2018-12-14 NOTE — PATIENT INSTRUCTIONS
1. Acute midline low back pain with right-sided sciatica  Plan:MRI spine. Orthopedic will call  You   Short course of prednisone 20 mg once daily for 7 days  Ok to take over the counter naprosyn 1-2 with melas up to twice daily as needed   p PHYSICAL THERAPY    symptomatic treatment with PHYSICAL THERAPY

## 2019-01-04 ENCOUNTER — TELEPHONE (OUTPATIENT)
Dept: FAMILY MEDICINE | Facility: CLINIC | Age: 29
End: 2019-01-04

## 2019-01-04 NOTE — TELEPHONE ENCOUNTER
Reason for Call:  Other call back    Detailed comments: Please let PT know if the back referral can be sent from  Physical therapy to [facility], because he is already going there for shoulder.   [facility] already sees Princess Turner there  .   PT also is wondering if it would be possible to get a chiro refferal, or if one is needed to schedule an apt with chiro?    Phone Number Patient can be reached at: Cell number on file:    Telephone Information:   Mobile [phone]     Best Time: any     Can we leave a detailed message on this number? YES    Call taken on 1/4/2019 at 3:13 PM by Nahomy Younger

## 2019-01-07 ENCOUNTER — OFFICE VISIT (OUTPATIENT)
Dept: NEUROSURGERY | Facility: CLINIC | Age: 29
End: 2019-01-07
Attending: NEUROLOGICAL SURGERY
Payer: COMMERCIAL

## 2019-01-07 VITALS
HEART RATE: 66 BPM | OXYGEN SATURATION: 97 % | BODY MASS INDEX: 29.85 KG/M2 | HEIGHT: 72 IN | WEIGHT: 220.4 LBS | SYSTOLIC BLOOD PRESSURE: 138 MMHG | DIASTOLIC BLOOD PRESSURE: 87 MMHG

## 2019-01-07 DIAGNOSIS — M54.16 LUMBAR RADICULOPATHY: Primary | ICD-10-CM

## 2019-01-07 PROCEDURE — G0463 HOSPITAL OUTPT CLINIC VISIT: HCPCS

## 2019-01-07 PROCEDURE — 99203 OFFICE O/P NEW LOW 30 MIN: CPT | Performed by: NURSE PRACTITIONER

## 2019-01-07 ASSESSMENT — PAIN SCALES - GENERAL: PAINLEVEL: MODERATE PAIN (4)

## 2019-01-07 ASSESSMENT — MIFFLIN-ST. JEOR: SCORE: 2002.73

## 2019-01-07 NOTE — NURSING NOTE
Adam Fish is a 28 year old male who presents for:  Chief Complaint   Patient presents with     Neurologic Problem     Low back pain radiates down the right buttocks and right leg.         Vitals:    Vitals:    01/07/19 1415   BP: 138/87   BP Location: Right arm   Patient Position: Sitting   Cuff Size: Adult Large   Pulse: 66   SpO2: 97%   Weight: 220 lb 6.4 oz (100 kg)   Height: 6' (1.829 m)       BMI:  Estimated body mass index is 29.89 kg/m  as calculated from the following:    Height as of this encounter: 6' (1.829 m).    Weight as of this encounter: 220 lb 6.4 oz (100 kg).    Pain Score:  Moderate Pain (4)      Do you feel safe in your environment?  Yes      Joyce Bardales

## 2019-01-07 NOTE — PROGRESS NOTES
[facility]  Neurosurgery Clinic Visit      CC: low back pain with right-sided radiculopathy    Primary care Provider: Jayesh Brown      Reason For Visit:   I was asked by Dr. Erinn Wellington to consult on the patient for acute midline low back pain with right-sided sciatica.      HPI: Adam Fish is a 28 year old male with a history of 6 weeks of low back pain that radiates to the right posterior thigh down to his knee. He states he was doing a lot of heavy lifting at the time of the initial injury. He was seen by his PCP and was provided a steroid. He noticed some relief of symptoms, however now the symptoms have returned. He describes the pain as a constant dull ache with intermittent sharp pain. He denies paraesthesias, weakness, foot drop, and bowel/bladder complaints. The pain is aggravated by prolonged sitting and standing up from a sitting position. Alleviating factors include rest, steroid use, and aleve. He has been undergoing physical therapy at [facility] for his shoulder and was provided some exercises for his low back. He has never had a formal PT consult. He does not have any imaging of his low back and has not had surgery.     Pain at its worst 8-9  Pain right now:  3-4    History reviewed. No pertinent past medical history.    Past Medical History reviewed with patient during visit.    History reviewed. No pertinent surgical history.  Past Surgical History reviewed with patient during visit.    Current Outpatient Medications   Medication     buPROPion (WELLBUTRIN XL) 300 MG 24 hr tablet     fluticasone (FLONASE) 50 MCG/ACT spray     traZODone (DESYREL) 50 MG tablet     No current facility-administered medications for this visit.        Allergies   Allergen Reactions     No Known Drug Allergies        Social History     Socioeconomic History     Marital status: Single     Spouse name: None     Number of children: None     Years of education: None     Highest education level:  None   Social Needs     Financial resource strain: None     Food insecurity - worry: None     Food insecurity - inability: None     Transportation needs - medical: None     Transportation needs - non-medical: None   Occupational History     None   Tobacco Use     Smoking status: Never Smoker     Smokeless tobacco: Never Used   Substance and Sexual Activity     Alcohol use: Yes     Alcohol/week: 0.0 oz     Comment: occ.     Drug use: No     Sexual activity: Yes   Other Topics Concern     Parent/sibling w/ CABG, MI or angioplasty before 65F 55M? Not Asked   Social History Narrative     None       History reviewed. No pertinent family history.      ROS: 10 point ROS neg other than the symptoms noted above in the HPI.    Vital Signs:       Examination:  Constitutional:  Alert, well nourished, NAD.  Memory: recent and remote memory   HEENT: Normocephalic, atraumatic.   Pulm:  Without shortness of breath   CV:  No pitting edema of BLE.    Neurological:  Awake  Alert  Oriented x 3  Speech clear  Motor exam    Hip Flexor:                Right: 5/5  Left:  5/5  Hip Adductor:             Right:  5/5  Left:  5/5  Hip Abductor:             Right:  5/5  Left:  5/5  Gastroc Soleus:        Right:  5/5  Left:  5/5  Tib/Ant:                      Right:  5/5  Left:  5/5  EHL:                          Right:  5/5  Left:  5/5   Sensation normal to bilateral upper and lower extremities  Muscle tone to bilateral upper and lower extremities   Gait: Able to stand from a seated position. Normal non-antalgic, non-myelopathic gait.  Able to heel/toe walk without loss of balance    Lumbar examination reveals no tenderness of the spine or paraspinous muscles.  Hip height is symmetrical. Negative SI joint, sciatic notch or greater trochanteric tenderness to palpation bilaterally.  Straight leg raise is negative bilaterally.      Imaging: None    Assessment/Plan:   Adam Fish is a 28 year old male with a history of 6 weeks of low back pain  that radiates to the right posterior thigh down to his knee. He states he was doing a lot of heavy lifting at the time of the initial injury. He was seen by his PCP and was provided a steroid. He noticed some relief of symptoms, however now the symptoms have returned. He describes the pain as a constant dull ache with intermittent sharp pain. He denies paraesthesias, weakness, foot drop, and bowel/bladder complaints. The pain is aggravated by prolonged sitting and standing up from a sitting position. Alleviating factors include rest, steroid use, and aleve. He has been undergoing physical therapy at [facility] for his shoulder and was provided some exercises for his low back. He has never had a formal PT consult. He does not have any imaging of his low back and has not had surgery. Recommended a lumbar MRI given his persistent symptoms. He expressed interest in shopping around for a lumbar MRI. A script for lumbar MRI was provided. I discussed releasing the images and report to the clinic for review after the MRI is completed. A business card was provided. I instructed him that we will contact him with the results and next steps. He verbalized understanding and agreement with the plan.    Patient Instructions   -Lumbar MRI ordered. Please have them send the images and report to our clinic for review. When we receive this information we will contact you with the results and recommendations.  -Please contact the clinic if pain persists at [phone].      Nery Matt, CNP  [facility]  [address] [address]    Tel [phone]  Pager [phone]

## 2019-01-07 NOTE — LETTER
1/7/2019         RE: Adam Fish  [address] [address]        Dear Colleague,    Thank you for referring your patient, Adam Fish, to the [facility]. Please see a copy of my visit note below.    [facility]  Neurosurgery Clinic Visit      CC: low back pain with right-sided radiculopathy    Primary care Provider: Jayesh Brown      Reason For Visit:   I was asked by Dr. Erinn Wellington to consult on the patient for acute midline low back pain with right-sided sciatica.      HPI: Adam Fish is a 28 year old male with a history of 6 weeks of low back pain that radiates to the right posterior thigh down to his knee. He states he was doing a lot of heavy lifting at the time of the initial injury. He was seen by his PCP and was provided a steroid. He noticed some relief of symptoms, however now the symptoms have returned. He describes the pain as a constant dull ache with intermittent sharp pain. He denies paraesthesias, weakness, foot drop, and bowel/bladder complaints. The pain is aggravated by prolonged sitting and standing up from a sitting position. Alleviating factors include rest, steroid use, and aleve. He has been undergoing physical therapy at [facility] for his shoulder and was provided some exercises for his low back. He has never had a formal PT consult. He does not have any imaging of his low back and has not had surgery.     Pain at its worst 8-9  Pain right now:  3-4    History reviewed. No pertinent past medical history.    Past Medical History reviewed with patient during visit.    History reviewed. No pertinent surgical history.  Past Surgical History reviewed with patient during visit.    Current Outpatient Medications   Medication     buPROPion (WELLBUTRIN XL) 300 MG 24 hr tablet     fluticasone (FLONASE) 50 MCG/ACT spray     traZODone (DESYREL) 50 MG tablet     No current facility-administered medications for  this visit.        Allergies   Allergen Reactions     No Known Drug Allergies        Social History     Socioeconomic History     Marital status: Single     Spouse name: None     Number of children: None     Years of education: None     Highest education level: None   Social Needs     Financial resource strain: None     Food insecurity - worry: None     Food insecurity - inability: None     Transportation needs - medical: None     Transportation needs - non-medical: None   Occupational History     None   Tobacco Use     Smoking status: Never Smoker     Smokeless tobacco: Never Used   Substance and Sexual Activity     Alcohol use: Yes     Alcohol/week: 0.0 oz     Comment: occ.     Drug use: No     Sexual activity: Yes   Other Topics Concern     Parent/sibling w/ CABG, MI or angioplasty before 65F 55M? Not Asked   Social History Narrative     None       History reviewed. No pertinent family history.      ROS: 10 point ROS neg other than the symptoms noted above in the HPI.    Vital Signs:       Examination:  Constitutional:  Alert, well nourished, NAD.  Memory: recent and remote memory   HEENT: Normocephalic, atraumatic.   Pulm:  Without shortness of breath   CV:  No pitting edema of BLE.    Neurological:  Awake  Alert  Oriented x 3  Speech clear  Motor exam    Hip Flexor:                Right: 5/5  Left:  5/5  Hip Adductor:             Right:  5/5  Left:  5/5  Hip Abductor:             Right:  5/5  Left:  5/5  Gastroc Soleus:        Right:  5/5  Left:  5/5  Tib/Ant:                      Right:  5/5  Left:  5/5  EHL:                          Right:  5/5  Left:  5/5   Sensation normal to bilateral upper and lower extremities  Muscle tone to bilateral upper and lower extremities   Gait: Able to stand from a seated position. Normal non-antalgic, non-myelopathic gait.  Able to heel/toe walk without loss of balance    Lumbar examination reveals no tenderness of the spine or paraspinous muscles.  Hip height is symmetrical.  Negative SI joint, sciatic notch or greater trochanteric tenderness to palpation bilaterally.  Straight leg raise is negative bilaterally.      Imaging: None    Assessment/Plan:   Adam Fish is a 28 year old male with a history of 6 weeks of low back pain that radiates to the right posterior thigh down to his knee. He states he was doing a lot of heavy lifting at the time of the initial injury. He was seen by his PCP and was provided a steroid. He noticed some relief of symptoms, however now the symptoms have returned. He describes the pain as a constant dull ache with intermittent sharp pain. He denies paraesthesias, weakness, foot drop, and bowel/bladder complaints. The pain is aggravated by prolonged sitting and standing up from a sitting position. Alleviating factors include rest, steroid use, and aleve. He has been undergoing physical therapy at [facility] for his shoulder and was provided some exercises for his low back. He has never had a formal PT consult. He does not have any imaging of his low back and has not had surgery. Recommended a lumbar MRI given his persistent symptoms. He expressed interest in shopping around for a lumbar MRI. A script for lumbar MRI was provided. I discussed releasing the images and report to the clinic for review after the MRI is completed. A business card was provided. I instructed him that we will contact him with the results and next steps. He verbalized understanding and agreement with the plan.    Patient Instructions   -Lumbar MRI ordered. Please have them send the images and report to our clinic for review. When we receive this information we will contact you with the results and recommendations.  -Please contact the clinic if pain persists at [phone].      Nery Matt CNP  [facility]  [address] [address]    Tel [phone]  Pager [phone]      Again, thank you for allowing me to  participate in the care of your patient.        Sincerely,        Nery Matt NP

## 2019-01-07 NOTE — PATIENT INSTRUCTIONS
-Lumbar MRI ordered. Please have them send the images and report to our clinic for review. When we receive this information we will contact you with the results and recommendations.  -Please contact the clinic if pain persists at [phone].

## 2019-01-08 NOTE — TELEPHONE ENCOUNTER
Referral faxed to [facility] ([phone])  It's the [facility] referral.  Not sure will be accepted there or needs new one for [facility].    VM left asking patient to call back.  Candi Gil RN

## 2019-01-11 NOTE — TELEPHONE ENCOUNTER
Left VM #2 for pt  Asked that he callback if referral still not where it needs to be  Mayela POLLARD RN

## 2019-01-11 NOTE — TELEPHONE ENCOUNTER
PT is returning call. He spoke with [facility].  They had not received it.  Just called them and asked to look through fax.  There was no fax.  Please call pt and let him know that it was sent again.   [facility] phone:  [phone] option 2 then option 1    Please fax to:   ATTN: [facility]   Fax: [phone]

## 2019-01-14 ENCOUNTER — TRANSFERRED RECORDS (OUTPATIENT)
Dept: HEALTH INFORMATION MANAGEMENT | Facility: CLINIC | Age: 29
End: 2019-01-14

## 2019-01-21 ENCOUNTER — OFFICE VISIT (OUTPATIENT)
Dept: NEUROSURGERY | Facility: CLINIC | Age: 29
End: 2019-01-21
Attending: NEUROLOGICAL SURGERY
Payer: COMMERCIAL

## 2019-01-21 VITALS
HEART RATE: 67 BPM | TEMPERATURE: 97.6 F | OXYGEN SATURATION: 99 % | RESPIRATION RATE: 16 BRPM | DIASTOLIC BLOOD PRESSURE: 91 MMHG | SYSTOLIC BLOOD PRESSURE: 144 MMHG

## 2019-01-21 DIAGNOSIS — M54.16 LUMBAR RADICULOPATHY: Primary | ICD-10-CM

## 2019-01-21 PROCEDURE — 99214 OFFICE O/P EST MOD 30 MIN: CPT | Performed by: PHYSICIAN ASSISTANT

## 2019-01-21 PROCEDURE — G0463 HOSPITAL OUTPT CLINIC VISIT: HCPCS

## 2019-01-21 ASSESSMENT — PAIN SCALES - GENERAL: PAINLEVEL: MODERATE PAIN (4)

## 2019-01-21 NOTE — PATIENT INSTRUCTIONS
Physical therapy at [facility] ordered - please call to schedule    Lumbar steroid injection ordered - they will contact you to schedule    Please contact the clinic if pain persists at [phone].

## 2019-01-21 NOTE — LETTER
1/21/2019         RE: Adam Fish  [address] [address]        Dear Colleague,    Thank you for referring your patient, Adam Fish, to the [facility]. Please see a copy of my visit note below.    [facility]  Neurosurgery followup:    HPI: Adam Fish is a 28 year old male with a history of 6 weeks of low back pain that radiates to the right posterior thigh down to his knee. He states he was doing a lot of heavy lifting at the time of the initial injury. He was seen by his PCP and was provided a steroid. He noticed some relief of symptoms, however now the symptoms have returned. He describes the pain as a constant dull ache with intermittent sharp pain. He denies paraesthesias, weakness, foot drop, and bowel/bladder complaints. The pain is aggravated by prolonged sitting and standing up from a sitting position. Alleviating factors include rest, steroid use, and aleve. He has been undergoing physical therapy at [facility] for his shoulder and was provided some exercises for his low back. He has never had a formal PT consult. He does not have any imaging of his low back and has not had surgery.   Returns today for follow up and MRI review. Having continued low back and posterolateral right leg pain to the knee. He has not yet started on low back PT as he has been awaiting imaging results. No injections. No weakness.  Exam:  Constitutional:  Alert, well nourished, NAD.  HEENT: Normocephalic, atraumatic.   Pulm:  Without shortness of breath   CV:  No pitting edema of BLE.      Neurological:  Awake  Alert  Oriented x 3  Motor exam:        IP Q DF PF EHL  R   5  5   5   5    5  L   5  5   5   5    5     Reflexes are 2+ in the patellar and Achilles. There is no clonus. Downgoing Babinski.    Able to spontaneously move L/E bilaterally  Sensation intact throughout all L/E dermatomes     Imaging: MRI of the lumbar spine from 1/14/2019 was reviewed in  the office today. Reveals right lateral recess stenosis at L4-5 abutting the right L5 nerve root and possibly L4 nerve root.  A/P: Returns today for follow up and MRI review. Having continued low back and posterolateral right leg pain to the knee. He has not yet started on low back PT as he has been awaiting imaging results. MRI with right lateral recess stenosis at L4-5 abutting right L5 nerve and possibly right L4 nerve. No weakness on exam. Will refer for PT and right L4-5 KELLY. Advised to contact clinic if pain persists. Patient voiced understanding and agreement.      Annita Spencer PA-C  [facility]  [place] [address]    Tel [phone]  Pager [phone]      Again, thank you for allowing me to participate in the care of your patient.        Sincerely,        Annita Spencer PA-C

## 2019-01-21 NOTE — NURSING NOTE
Adam Fish is a 28 year old male who presents for:  Chief Complaint   Patient presents with     Results        Initial Vitals:  There were no vitals taken for this visit. Estimated body mass index is 29.89 kg/m  as calculated from the following:    Height as of 1/7/19: 6' (1.829 m).    Weight as of 1/7/19: 220 lb 6.4 oz (100 kg).. There is no height or weight on file to calculate BSA. BP completed using cuff size: regular  Data Unavailable        Nursing Comments: review MRI results        Keira Brush

## 2019-01-21 NOTE — PROGRESS NOTES
[facility]  Neurosurgery followup:    HPI: Adam Fish is a 28 year old male with a history of 6 weeks of low back pain that radiates to the right posterior thigh down to his knee. He states he was doing a lot of heavy lifting at the time of the initial injury. He was seen by his PCP and was provided a steroid. He noticed some relief of symptoms, however now the symptoms have returned. He describes the pain as a constant dull ache with intermittent sharp pain. He denies paraesthesias, weakness, foot drop, and bowel/bladder complaints. The pain is aggravated by prolonged sitting and standing up from a sitting position. Alleviating factors include rest, steroid use, and aleve. He has been undergoing physical therapy at [facility] for his shoulder and was provided some exercises for his low back. He has never had a formal PT consult. He does not have any imaging of his low back and has not had surgery.   Returns today for follow up and MRI review. Having continued low back and posterolateral right leg pain to the knee. He has not yet started on low back PT as he has been awaiting imaging results. No injections. No weakness.  Exam:  Constitutional:  Alert, well nourished, NAD.  HEENT: Normocephalic, atraumatic.   Pulm:  Without shortness of breath   CV:  No pitting edema of BLE.      Neurological:  Awake  Alert  Oriented x 3  Motor exam:        IP Q DF PF EHL  R   5  5   5   5    5  L   5  5   5   5    5     Reflexes are 2+ in the patellar and Achilles. There is no clonus. Downgoing Babinski.    Able to spontaneously move L/E bilaterally  Sensation intact throughout all L/E dermatomes     Imaging: MRI of the lumbar spine from 1/14/2019 was reviewed in the office today. Reveals right lateral recess stenosis at L4-5 abutting the right L5 nerve root and possibly L4 nerve root.  A/P: Returns today for follow up and MRI review. Having continued low back and posterolateral right leg pain to the knee. He has not yet  started on low back PT as he has been awaiting imaging results. MRI with right lateral recess stenosis at L4-5 abutting right L5 nerve and possibly right L4 nerve. No weakness on exam. Will refer for PT and right L4-5 KELLY. Advised to contact clinic if pain persists. Patient voiced understanding and agreement.      Annita Spencer PA-C  [facility]  [address] [address]    Tel [phone]  Pager [phone]

## 2019-01-25 ENCOUNTER — TRANSFERRED RECORDS (OUTPATIENT)
Dept: HEALTH INFORMATION MANAGEMENT | Facility: CLINIC | Age: 29
End: 2019-01-25

## 2019-01-29 ENCOUNTER — TELEPHONE (OUTPATIENT)
Dept: FAMILY MEDICINE | Facility: CLINIC | Age: 29
End: 2019-01-29

## 2019-01-29 NOTE — TELEPHONE ENCOUNTER
Forms received from [facility] for review and signature  Placed forms:  On your desk  Forms need to be faxed to [phone]    Patient call? no  Copy sent to scanning? yes    All.LATA Dumont

## 2019-02-04 ENCOUNTER — HOSPITAL ENCOUNTER (OUTPATIENT)
Dept: GENERAL RADIOLOGY | Facility: CLINIC | Age: 29
End: 2019-02-04
Attending: RADIOLOGY | Admitting: RADIOLOGY
Payer: COMMERCIAL

## 2019-02-04 ENCOUNTER — HOSPITAL ENCOUNTER (OUTPATIENT)
Facility: CLINIC | Age: 29
Discharge: HOME | End: 2019-02-04
Attending: RADIOLOGY | Admitting: RADIOLOGY
Payer: COMMERCIAL

## 2019-02-04 VITALS
OXYGEN SATURATION: 99 % | DIASTOLIC BLOOD PRESSURE: 97 MMHG | TEMPERATURE: 98.5 F | RESPIRATION RATE: 20 BRPM | HEART RATE: 67 BPM | SYSTOLIC BLOOD PRESSURE: 152 MMHG

## 2019-02-04 DIAGNOSIS — M54.16 LUMBAR RADICULOPATHY: ICD-10-CM

## 2019-02-04 PROCEDURE — 25000128 H RX IP 250 OP 636: Performed by: PHYSICIAN ASSISTANT

## 2019-02-04 PROCEDURE — 25000125 ZZHC RX 250: Performed by: PHYSICIAN ASSISTANT

## 2019-02-04 PROCEDURE — 62323 NJX INTERLAMINAR LMBR/SAC: CPT

## 2019-02-04 PROCEDURE — 40000863 ZZH STATISTIC RADIOLOGY XRAY, US, CT, MAR, NM

## 2019-02-04 PROCEDURE — 25500064 ZZH RX 255 OP 636: Performed by: PHYSICIAN ASSISTANT

## 2019-02-04 RX ORDER — DEXTROSE MONOHYDRATE 25 G/50ML
25-50 INJECTION, SOLUTION INTRAVENOUS
Status: DISCONTINUED | OUTPATIENT
Start: 2019-02-04 | End: 2019-02-04 | Stop reason: HOSPADM

## 2019-02-04 RX ORDER — COVID-19 ANTIGEN TEST
220 KIT MISCELLANEOUS 2 TIMES DAILY WITH MEALS
COMMUNITY
End: 2019-09-26

## 2019-02-04 RX ORDER — IOPAMIDOL 408 MG/ML
10 INJECTION, SOLUTION INTRATHECAL ONCE
Status: COMPLETED | OUTPATIENT
Start: 2019-02-04 | End: 2019-02-04

## 2019-02-04 RX ORDER — NICOTINE POLACRILEX 4 MG
15-30 LOZENGE BUCCAL
Status: DISCONTINUED | OUTPATIENT
Start: 2019-02-04 | End: 2019-02-04 | Stop reason: HOSPADM

## 2019-02-04 RX ORDER — LIDOCAINE HYDROCHLORIDE 10 MG/ML
30 INJECTION, SOLUTION EPIDURAL; INFILTRATION; INTRACAUDAL; PERINEURAL ONCE
Status: COMPLETED | OUTPATIENT
Start: 2019-02-04 | End: 2019-02-04

## 2019-02-04 RX ORDER — NICOTINE POLACRILEX 4 MG
15-30 LOZENGE BUCCAL
Status: DISCONTINUED | OUTPATIENT
Start: 2019-02-04 | End: 2019-02-04

## 2019-02-04 RX ORDER — DEXTROSE MONOHYDRATE 25 G/50ML
25-50 INJECTION, SOLUTION INTRAVENOUS
Status: DISCONTINUED | OUTPATIENT
Start: 2019-02-04 | End: 2019-02-04

## 2019-02-04 RX ORDER — DEXAMETHASONE SODIUM PHOSPHATE 10 MG/ML
20 INJECTION, SOLUTION INTRAMUSCULAR; INTRAVENOUS ONCE
Status: COMPLETED | OUTPATIENT
Start: 2019-02-04 | End: 2019-02-04

## 2019-02-04 RX ADMIN — DEXAMETHASONE SODIUM PHOSPHATE 20 MG: 10 INJECTION, SOLUTION INTRAMUSCULAR; INTRAVENOUS at 13:32

## 2019-02-04 RX ADMIN — LIDOCAINE HYDROCHLORIDE 6 ML: 10 INJECTION, SOLUTION EPIDURAL; INFILTRATION; INTRACAUDAL; PERINEURAL at 13:32

## 2019-02-04 RX ADMIN — IOPAMIDOL 1 ML: 408 INJECTION, SOLUTION INTRATHECAL at 13:28

## 2019-02-04 NOTE — PROGRESS NOTES
Care Suites Discharge Summary    Discharge Criteria:   Discharge Criteria met per MD orders: Yes.   Vital signs stable.     Pt demonstrates ability to ambulate safely: Yes.  (See discharge questionnaire for additional information)    Discharge instructions & education:   Discharge instructions reviewed with patient . Patient verbalizes  understanding.   Additional patient education provided:  none.     Medications:   Patient will be discharging on new medications- No. Patient verbalizes reason for use, start date, and side effects NA.    Items returned to patient:   Home and hospital acquired medications returned to patient NA   Listed belongings gathered and returned to patient: Yes    Patient discharged to home per self    Lisa Antonio

## 2019-02-04 NOTE — IP AVS SNAPSHOT
[place] [address] [address] [place] [address]  Phone:  [phone]                                    After Visit Summary   2/4/2019    Adam Fish    MRN: [id number]           After Visit Summary Signature Page    I have received my discharge instructions, and my questions have been answered. I have discussed any challenges I see with this plan with the nurse or doctor.    ..........................................................................................................................................  Patient/Patient Representative Signature      ..........................................................................................................................................  Patient Representative Print Name and Relationship to Patient    ..................................................               ................................................  Date                                   Time    ..........................................................................................................................................  Reviewed by Signature/Title    ...................................................              ..............................................  Date                                               Time          22EPIC Rev 08/18

## 2019-02-04 NOTE — PROGRESS NOTES
RADIOLOGY PROCEDURE NOTE  Patient name: Adam Fish  MRN: [id number]  : 1990    Pre-procedure diagnosis: Back and right leg pain  Post-procedure diagnosis: Same    Procedure Date/Time: 2019  1:40 PM  Procedure: Right L4-L5 Interlaminar KELLY  Estimated blood loss: None  Specimen(s) collected with description: none  The patient tolerated the procedure well with no immediate complications.  Significant findings:none    See imaging dictation for procedural details.    Provider name: Francois Roberts  Assistant(s):None

## 2019-02-04 NOTE — IP AVS SNAPSHOT
MRN:[id number]                      After Visit Summary   2/4/2019    Adam Fish    MRN: [id number]           Visit Information        Department      2/4/2019 12:25 PM [facility]          Review of your medicines      UNREVIEWED medicines. Ask your doctor about these medicines       Dose / Directions   buPROPion 300 MG 24 hr tablet  Commonly known as:  WELLBUTRIN XL  Used for:  Depression with anxiety      Dose:  300 mg  Take 1 tablet (300 mg) by mouth every morning  Quantity:  90 tablet  Refills:  3     fluticasone 50 MCG/ACT nasal spray  Commonly known as:  FLONASE  Used for:  Chronic sinusitis, unspecified location      Dose:  1-2 spray  Spray 1-2 sprays into both nostrils daily  Quantity:  1 Bottle  Refills:  11     predniSONE 20 MG tablet  Commonly known as:  DELTASONE  Used for:  Acute midline low back pain with right-sided sciatica  Ask about: Should I take this medication?      Dose:  20 mg  Take 20 mg by mouth daily for 7 days.  Quantity:  7 tablet  Refills:  0     traZODone 50 MG tablet  Commonly known as:  DESYREL  Used for:  Depression with anxiety      1-3 at hs prn sleep  Quantity:  270 tablet  Refills:  0              Protect others around you: Learn how to safely use, store and throw away your medicines at www.disposemymeds.org.       Follow-ups after your visit       Your next 10 appointments already scheduled    Feb 04, 2019  1:00 PM CST  (Arrive by 12:15 PM)  XR LUMBAR EPIDURAL INJECTION with SHXR4, SH MSK RAD  [facility] ([facility]) [address] [place] [id number]  [phone]   How do I prepare for my exam? (Food and drink instructions) No Food and Drink Restrictions.  How do I prepare for my exam? (Other instructions) * If you take Coumadin (or any other blood thinners) you may need to stop taking them up to 5 days prior to the exam. Talk to your doctor before stopping. * If you take Plavix, Ticlid,  Pletal or Persantine, please ask your doctor if you should stop these medicines. You may need extra tests on the morning of your scan. * If you take Xarelto (Rivaroxaban), you may need to stop taking it 24 hours before treatment. Talk to your doctor before stopping this medicine.  What should I wear: Wear comfortable clothes.  How long does the exam take: Injections take about 30 to 45 minutes. Most people spend up to 2 hours in the clinic or hospital.  What should I bring: Please bring any scans or X-rays taken at other hospitals, if similar tests were done. Also bring a list of your medicines, including vitamins, minerals and over-the-counter drugs. It is safest to leave personal items at home. You will need a  : Plan to have someone drive you home afterward.  What do I need to tell my doctor: Tell your doctor in advance: * If you are allergic to X-ray dye (contrast fluid). * If you may be pregnant.  What should I do after the exam: You may have slight cramping during this exam. The cramps should go away afterward. You may have some spotting after the exam.  What is this test: A spinal shot is done in or near the spine. You may receive steroid medicine (to reduce swelling) or contrast fluid (dye that makes the area show up more clearly on X-rays). A nerve root shot is a shot into the nerve near the spine. It may reduce inflammation near the nerve root or spinal cord and reduce pain in the arm or leg.  Who should I call with questions: If you have any questions, please call the Imaging Department where you will have your exam. Directions, parking instructions, and other information is available on our website, [email or web address].      Care Instructions       Further instructions from your care team       Steroid Injection Discharge Instructions     After you go home:      You may resume your normal diet.    Care of Puncture Site:      If you have a bandaid on your puncture site, you may remove it the next  morning    You may shower tomorrow    No bath tubs, whirlpools or swimming for at least 3 days     Activity:      You may go back to normal activity in 24 hours    You should let pain be your guide as to the extent of your activities    Maintain any activity limitations as ordered by your provider    Do NOT drive a vehicle until tomorrow    Medicines:      You may resume all medications    For minor pain, you may take Acetaminophen (Tylenol) or Ibuprofen (Advil)    Pain:       You may experience increased or different pain over the next 24-48 hours    For the next 48 hrs - you may use ice packs for discomfort     Call your primary care doctor if:      You have severe pain that does not improve with pain medication    You have chills or a fever greater than 101 F (38 C)    The site is red, swollen, hot or tender    New problems with your bowel or bladder    Any questions or concerns    Other Instructions:      New numbness down your leg post injection is temporary and may last for up to 6 hours. You may need assistance with activity until your leg has normal sensation.    If you are diabetic, monitor your blood sugar closely. Contact the provider who manages your diabetes to help you control your blood sugar if needed.    For Your Information:      A steroid was injected to help decrease swelling and may help to reduce pain. It may take up to 7-10 days to obtain full results.    Some patients will get lasting relief from a single injection. Others may require up to 3 injections to get results. If you have more than one steroid injection, they should be given 2 weeks apart.    Side effects of your steroid injection are mild and will go away in 2-3 days  - Insomnia  - Heartburn  - Flushed face  - Water retention  - Increased appetite  - Increased blood sugar      If you have questions call:        [facility] Dept @ [phone]              Additional Information About Your Visit       Marcelo  Information    [employer] gives you secure access to your electronic health record. If you see a primary care provider, you can also send messages to your care team and make appointments. If you have questions, please call your primary care clinic.  If you do not have a primary care provider, please call [phone] and they will assist you.       Care EveryWhere ID    This is your Care EveryWhere ID. This could be used by other organizations to access your [facility] medical records  [id number]       Your Vitals Were     Blood Pressure   142/91   (BP Location: Left arm)    Pulse   67    Temperature   98.5  F (36.9  C) (Oral)    Respirations   16    Pulse Oximetry   99%          Primary Care Provider Office Phone # Fax #    Jayesh Stewart Brown [phone]      Equal Access to Services    KARLA WONG : Hadii taty jackson hadasho Soomaali, waaxda luqadaha, qaybta kaalmada adeegyada, chelsea gonzalez . So [facility] [phone].    ATENCIÓN: Si habla español, tiene a maguire disposición servicios gratuitos de asistencia lingüística. [facility] [phone].    We comply with applicable federal civil rights laws and [place] laws. We do not discriminate on the basis of race, color, national origin, age, disability, sex, sexual orientation, or gender identity.           Thank you!    Thank you for choosing [facility] for your care. Our goal is always to provide you with excellent care. Hearing back from our patients is one way we can continue to improve our services. Please take a few minutes to complete the written survey that you may receive in the mail after you visit with us. Thank you!            Medication List      ASK your doctor about these medications          Morning Afternoon Evening Bedtime As Needed    buPROPion 300 MG 24 hr tablet  Also known as:  WELLBUTRIN XL  INSTRUCTIONS:  Take 1 tablet (300 mg) by mouth every morning                     fluticasone 50 MCG/ACT nasal spray  Also  known as:  FLONASE  INSTRUCTIONS:  Spray 1-2 sprays into both nostrils daily                     predniSONE 20 MG tablet  Also known as:  DELTASONE  INSTRUCTIONS:  Take 20 mg by mouth daily for 7 days.  Ask about: Should I take this medication?                     traZODone 50 MG tablet  Also known as:  DESYREL  INSTRUCTIONS:  1-3 at hs prn sleep

## 2019-02-04 NOTE — PROGRESS NOTES
JOSE Parrish notified that pt drove himself to hospital today.  Provider will discuss post procedure plan with pt.

## 2019-02-04 NOTE — PROGRESS NOTES
Pt able to stand and ambulate safely post procedure. Activity observed with stand by assist. PT will discharge per self.

## 2019-02-04 NOTE — PROGRESS NOTES
Pt returns to [facility] via cart post injection and tolerated procedure well. Tolerating po food and fluids.  States pain #2/10.  Pt will wait 1 hour and able to drive post procedure per PA instructions.

## 2019-02-04 NOTE — DISCHARGE INSTRUCTIONS
Steroid Injection Discharge Instructions     After you go home:      You may resume your normal diet.    Care of Puncture Site:      If you have a bandaid on your puncture site, you may remove it the next morning    You may shower tomorrow    No bath tubs, whirlpools or swimming for at least 3 days     Activity:      You may go back to normal activity in 24 hours    You should let pain be your guide as to the extent of your activities    Maintain any activity limitations as ordered by your provider    Do NOT drive a vehicle until tomorrow    Medicines:      You may resume all medications    For minor pain, you may take Acetaminophen (Tylenol) or Ibuprofen (Advil)    Pain:       You may experience increased or different pain over the next 24-48 hours    For the next 48 hrs - you may use ice packs for discomfort     Call your primary care doctor if:      You have severe pain that does not improve with pain medication    You have chills or a fever greater than 101 F (38 C)    The site is red, swollen, hot or tender    New problems with your bowel or bladder    Any questions or concerns    Other Instructions:      New numbness down your leg post injection is temporary and may last for up to 6 hours. You may need assistance with activity until your leg has normal sensation.    If you are diabetic, monitor your blood sugar closely. Contact the provider who manages your diabetes to help you control your blood sugar if needed.    For Your Information:      A steroid was injected to help decrease swelling and may help to reduce pain. It may take up to 7-10 days to obtain full results.    Some patients will get lasting relief from a single injection. Others may require up to 3 injections to get results. If you have more than one steroid injection, they should be given 2 weeks apart.    Side effects of your steroid injection are mild and will go away in 2-3 days  - Insomnia  - Heartburn  - Flushed face  - Water  retention  - Increased appetite  - Increased blood sugar      If you have questions call:        [facility] @ [phone]

## 2019-02-04 NOTE — PROGRESS NOTES
Pre procedure plan of care reviewed with pt prior to exam.  All questions answered and pt appears to accept and understand.  Discharge instructions also reviewed prior to injection.

## 2019-02-18 ENCOUNTER — TRANSFERRED RECORDS (OUTPATIENT)
Dept: HEALTH INFORMATION MANAGEMENT | Facility: CLINIC | Age: 29
End: 2019-02-18

## 2019-04-01 ENCOUNTER — OFFICE VISIT (OUTPATIENT)
Dept: FAMILY MEDICINE | Facility: CLINIC | Age: 29
End: 2019-04-01
Payer: COMMERCIAL

## 2019-04-01 VITALS
RESPIRATION RATE: 14 BRPM | SYSTOLIC BLOOD PRESSURE: 119 MMHG | BODY MASS INDEX: 29.57 KG/M2 | DIASTOLIC BLOOD PRESSURE: 79 MMHG | OXYGEN SATURATION: 99 % | HEART RATE: 77 BPM | WEIGHT: 218 LBS

## 2019-04-01 DIAGNOSIS — F41.8 DEPRESSION WITH ANXIETY: Primary | ICD-10-CM

## 2019-04-01 PROCEDURE — 99213 OFFICE O/P EST LOW 20 MIN: CPT | Performed by: FAMILY MEDICINE

## 2019-04-01 RX ORDER — HYDROXYZINE HYDROCHLORIDE 10 MG/1
10 TABLET, FILM COATED ORAL 3 TIMES DAILY PRN
Qty: 30 TABLET | Refills: 0 | Status: SHIPPED | OUTPATIENT
Start: 2019-04-01 | End: 2019-04-01

## 2019-04-01 RX ORDER — HYDROXYZINE HYDROCHLORIDE 10 MG/1
10 TABLET, FILM COATED ORAL 3 TIMES DAILY PRN
Qty: 30 TABLET | Refills: 11 | Status: SHIPPED | OUTPATIENT
Start: 2019-04-01 | End: 2019-05-24

## 2019-04-01 RX ORDER — BUPROPION HYDROCHLORIDE 450 MG/1
450 TABLET, FILM COATED, EXTENDED RELEASE ORAL EVERY MORNING
Qty: 90 TABLET | Refills: 0 | Status: SHIPPED | OUTPATIENT
Start: 2019-04-01 | End: 2019-05-24

## 2019-04-01 ASSESSMENT — ANXIETY QUESTIONNAIRES
1. FEELING NERVOUS, ANXIOUS, OR ON EDGE: MORE THAN HALF THE DAYS
6. BECOMING EASILY ANNOYED OR IRRITABLE: MORE THAN HALF THE DAYS
2. NOT BEING ABLE TO STOP OR CONTROL WORRYING: MORE THAN HALF THE DAYS
3. WORRYING TOO MUCH ABOUT DIFFERENT THINGS: MORE THAN HALF THE DAYS
7. FEELING AFRAID AS IF SOMETHING AWFUL MIGHT HAPPEN: NOT AT ALL
GAD7 TOTAL SCORE: 12
5. BEING SO RESTLESS THAT IT IS HARD TO SIT STILL: MORE THAN HALF THE DAYS

## 2019-04-01 ASSESSMENT — PATIENT HEALTH QUESTIONNAIRE - PHQ9
SUM OF ALL RESPONSES TO PHQ QUESTIONS 1-9: 16
5. POOR APPETITE OR OVEREATING: MORE THAN HALF THE DAYS

## 2019-04-01 ASSESSMENT — PAIN SCALES - GENERAL: PAINLEVEL: NO PAIN (0)

## 2019-04-01 NOTE — PROGRESS NOTES
SUBJECTIVE:   Adam Fish is a 29 year old male who presents to clinic today for the following health issues:      Depression and Anxiety Follow-Up    Status since last visit: No change    Other associated symptoms:None    Complicating factors:     Significant life event: No     Current substance abuse: None    PHQ 3/23/2017 3/29/2018 4/1/2019   PHQ-9 Total Score 13 11 16   Q9: Thoughts of better off dead/self-harm past 2 weeks Not at all Not at all Not at all     JELANI-7 SCORE 5/18/2015 3/23/2017 4/1/2019   Total Score 7 - -   Total Score - 12 12     Amount of exercise or physical activity: 2-3 days/week for an average of 15-30 minutes    Problems taking medications regularly: No    Medication side effects: none    Diet: regular (no restrictions)  Problem list and histories reviewed & adjusted, as indicated.  Additional history: as documented    Patient Active Problem List   Diagnosis     CARDIOVASCULAR SCREENING; LDL GOAL LESS THAN 160     Impingement syndrome, shoulder     Depression with anxiety     Elevated BP without diagnosis of hypertension     Acute midline low back pain with right-sided sciatica     No past surgical history on file.    Social History     Tobacco Use     Smoking status: Never Smoker     Smokeless tobacco: Never Used   Substance Use Topics     Alcohol use: Yes     Alcohol/week: 0.0 oz     Comment: occ.     No family history on file.        Reviewed and updated as needed this visit by clinical staff  Tobacco  Allergies  Meds       Reviewed and updated as needed this visit by Provider         ROS:  Constitutional, HEENT, cardiovascular, pulmonary, gi and gu systems are negative, except as otherwise noted.    OBJECTIVE:     /79 (BP Location: Right arm, Patient Position: Sitting, Cuff Size: Adult Large)   Pulse 77   Resp 14   Wt 98.9 kg (218 lb)   SpO2 99%   BMI 29.57 kg/m    Body mass index is 29.57 kg/m .  GENERAL: healthy, alert and no distress  RESP: lungs clear to  auscultation - no rales, rhonchi or wheezes  CV: regular rate and rhythm, normal S1 S2, no S3 or S4, no murmur, click or rub, no peripheral edema and peripheral pulses strong  PSYCH: mentation appears normal, affect normal/bright    Diagnostic Test Results:  No results found for this or any previous visit (from the past 24 hour(s)).    ASSESSMENT/PLAN:       ICD-10-CM    1. Depression with anxiety F41.8 buPROPion 450 MG TB24     hydrOXYzine (ATARAX) 10 MG tablet          Medication has not been very effective. Increased to 450mg today. Advised to schedule a follow-up visit with PCP.     Lulu Cope MD  [facility]

## 2019-04-02 ASSESSMENT — ANXIETY QUESTIONNAIRES: GAD7 TOTAL SCORE: 12

## 2019-05-02 ENCOUNTER — OFFICE VISIT (OUTPATIENT)
Dept: PSYCHOLOGY | Facility: CLINIC | Age: 29
End: 2019-05-02
Payer: COMMERCIAL

## 2019-05-02 DIAGNOSIS — F33.1 MAJOR DEPRESSIVE DISORDER, RECURRENT EPISODE, MODERATE (H): Primary | ICD-10-CM

## 2019-05-02 DIAGNOSIS — F41.1 GENERALIZED ANXIETY DISORDER: ICD-10-CM

## 2019-05-02 PROCEDURE — 90834 PSYTX W PT 45 MINUTES: CPT | Performed by: COUNSELOR

## 2019-05-02 ASSESSMENT — ANXIETY QUESTIONNAIRES
7. FEELING AFRAID AS IF SOMETHING AWFUL MIGHT HAPPEN: SEVERAL DAYS
2. NOT BEING ABLE TO STOP OR CONTROL WORRYING: NEARLY EVERY DAY
IF YOU CHECKED OFF ANY PROBLEMS ON THIS QUESTIONNAIRE, HOW DIFFICULT HAVE THESE PROBLEMS MADE IT FOR YOU TO DO YOUR WORK, TAKE CARE OF THINGS AT HOME, OR GET ALONG WITH OTHER PEOPLE: SOMEWHAT DIFFICULT
3. WORRYING TOO MUCH ABOUT DIFFERENT THINGS: NEARLY EVERY DAY
1. FEELING NERVOUS, ANXIOUS, OR ON EDGE: NEARLY EVERY DAY
GAD7 TOTAL SCORE: 15
5. BEING SO RESTLESS THAT IT IS HARD TO SIT STILL: SEVERAL DAYS
6. BECOMING EASILY ANNOYED OR IRRITABLE: MORE THAN HALF THE DAYS

## 2019-05-02 ASSESSMENT — PATIENT HEALTH QUESTIONNAIRE - PHQ9
SUM OF ALL RESPONSES TO PHQ QUESTIONS 1-9: 15
5. POOR APPETITE OR OVEREATING: MORE THAN HALF THE DAYS

## 2019-05-02 NOTE — PROGRESS NOTES
Progress Note - Initial Session    Client Name:  Adam Fish Date: 5/2/2019         Service Type: Individual  Video Visit: No     Session Start Time: 1:00p  Session End Time: 1:45p     Session Length: 45 minutes    Session #: 1    Attendees: Client attended alone       DATA:  Diagnostic Assessment in progress.  Unable to complete documentation at the conclusion of the first session due to assessing mental health symptoms.     Interactive Complexity: No  Crisis: No      Intervention:  CBT: assess maladaptive patterns of behavior and cognition   Motivational Interviewing: assess and address ambivalence towards change and readiness and willingness to change, evoke change talk, challenge sustain talk, point out discrepancies, explore ambivalence towards change and road blocks      ASSESSMENT:  Mental Status Assessment:  Appearance:   Appropriate   Eye Contact:   Fair   Psychomotor Behavior: Normal  Restless   Attitude:   Cooperative   Orientation:   All  Speech   Rate / Production: Normal    Volume:  Normal   Mood:    Anxious  Depressed   Affect:    Appropriate  Mood congruent   Thought Content:  Clear   Thought Form:  Coherent  Goal Directed  Logical   Insight:    Good       Safety Issues and Plan for Safety and Risk Management:  Client denies current fears or concerns for personal safety.  Client denies current or recent suicidal ideation or behaviors. Reports passive, fleeting thoughts about wanting to escape and not have to deal with things a couple weeks ago, but denies active plans or desire to end his life.   Client denies current or recent homicidal ideation or behaviors.  Client denies current or recent self injurious behavior or ideation.  Client denies other safety concerns.  A safety and risk management plan has not been developed at this time, however client was given the after-hours number / 911 should there be a change in any of these risk factors.      Diagnostic Criteria:  A.  Excessive anxiety and worry about a number of events or activities (such as work or school performance).   B. The person finds it difficult to control the worry.  C. Select 3 or more symptoms (required for diagnosis). Only one item is required in children.   - Restlessness or feeling keyed up or on edge.    - Being easily fatigued.    - Difficulty concentrating or mind going blank.    - Irritability.    - Sleep disturbance (difficulty falling or staying asleep, or restless unsatisfying sleep).   D. The focus of the anxiety and worry is not confined to features of an Axis I disorder.  E. The anxiety, worry, or physical symptoms cause clinically significant distress or impairment in social, occupational, or other important areas of functioning.   F. The disturbance is not due to the direct physiological effects of a substance (e.g., a drug of abuse, a medication) or a general medical condition (e.g., hyperthyroidism) and does not occur exclusively during a Mood Disorder, a Psychotic Disorder, or a Pervasive Developmental Disorder.  A) Recurrent episode(s) - symptoms have been present during the same 2-week period and represent a change from previous functioning 5 or more symptoms (required for diagnosis)   - Depressed mood. Note: In children and adolescents, can be irritable mood.     - Diminished interest or pleasure in all, or almost all, activities.    - Disrupted sleep.    - Psychomotor activity agitation.    - Fatigue or loss of energy.    - Feelings of worthlessness or inappropriate and excessive guilt.    - Diminished ability to think or concentrate, or indecisiveness.   B) The symptoms cause clinically significant distress or impairment in social, occupational, or other important areas of functioning  C) The episode is not attributable to the physiological effects of a substance or to another medical condition  D) The occurence of major depressive episode is not better explained by other thought / psychotic  disorders  E) There has never been a manic episode or hypomanic episode      DSM5 Diagnoses: (Sustained by DSM5 Criteria Listed Above)  Diagnoses: 296.32 (F33.1) Major Depressive Disorder, Recurrent Episode, Moderate & 300.02 (F41.1) Generalized Anxiety Disorder; RULE .23 (F40.10) Social Anxiety Disorder  Psychosocial & Contextual Factors: Lack of reward at work  WHODAS 2.0 (12 item)            This questionnaire asks about difficulties due to health conditions. Health conditions  include  disease or illnesses, other health problems that may be short or long lasting,  injuries, mental health or emotional problems, and problems with alcohol or drugs.                     Think back over the past 30 days and answer these questions, thinking about how much  difficulty you had doing the following activities. For each question, please [place] only  one response.    S1 Standing for long periods such as 30 minutes? Mild =           2   S2 Taking care of household responsibilities? Moderate =   3   S3 Learning a new task, for example, learning how to get to a new place? Mild =           2   S4 How much of a problem do you have joining community activities (for example, festivals, [employer]ab[employer]ism or other activities) in the same way as anyone else can? None =         1   S5 How much have you been emotionally affected by your health problems? Mild =           2     In the past 30 days, how much difficulty did you have in: BACK PAGE WAS MISSING   S6 Concentrating on doing something for ten minutes? ---   S7 Walking a long distance such as a kilometer (or equivalent)? ---   S8 Washing your whole body? ---   S9 Getting dressed? ---   S10 Dealing with people you do not know? ---   S11 Maintaining a friendship? ---   S12 Your day to day work? --     H1 Overall, in the past 30 days, how many days were these difficulties present? Record number of days ---   H2 In the past 30 days, for how many days were you totally unable to carry  out your usual activities or work because of any health condition? Record number of days ---   H3 In the past 30 days, not counting the days that you were totally unable, for how many days did you cut back or reduce your usual activities or work because of any health condition? Record number of days ---       Collateral Reports Completed:  Routed note to PCP      PLAN: (Homework, other):  Client stated that he may follow up for ongoing services with [facility]. Continue to assess mental health symptoms and complete intake.       Lexi Han, [facility]

## 2019-05-03 ASSESSMENT — ANXIETY QUESTIONNAIRES: GAD7 TOTAL SCORE: 15

## 2019-05-24 ENCOUNTER — OFFICE VISIT (OUTPATIENT)
Dept: FAMILY MEDICINE | Facility: CLINIC | Age: 29
End: 2019-05-24
Payer: COMMERCIAL

## 2019-05-24 VITALS
TEMPERATURE: 98.3 F | BODY MASS INDEX: 29.34 KG/M2 | HEIGHT: 72 IN | WEIGHT: 216.6 LBS | SYSTOLIC BLOOD PRESSURE: 151 MMHG | DIASTOLIC BLOOD PRESSURE: 91 MMHG | OXYGEN SATURATION: 98 % | HEART RATE: 102 BPM

## 2019-05-24 DIAGNOSIS — F43.23 ADJUSTMENT DISORDER WITH MIXED ANXIETY AND DEPRESSED MOOD: Primary | ICD-10-CM

## 2019-05-24 DIAGNOSIS — R04.0 EPISTAXIS: ICD-10-CM

## 2019-05-24 PROCEDURE — 99214 OFFICE O/P EST MOD 30 MIN: CPT | Performed by: FAMILY MEDICINE

## 2019-05-24 RX ORDER — BUPROPION HYDROCHLORIDE 150 MG/1
150 TABLET ORAL EVERY MORNING
Qty: 60 TABLET | Refills: 0 | Status: SHIPPED | OUTPATIENT
Start: 2019-05-24 | End: 2019-09-26

## 2019-05-24 RX ORDER — HYDROXYZINE PAMOATE 25 MG/1
25 CAPSULE ORAL 3 TIMES DAILY PRN
Qty: 60 CAPSULE | Refills: 0 | Status: SHIPPED | OUTPATIENT
Start: 2019-05-24 | End: 2019-07-01

## 2019-05-24 ASSESSMENT — MIFFLIN-ST. JEOR: SCORE: 1980.49

## 2019-05-24 NOTE — PROGRESS NOTES
Subjective     Adam Fish is a 29 year old male who presents to clinic today for the following health issues:    HPI   Depression Followup    How are you doing with your depression since your last visit? No change    Are you having other symptoms that might be associated with depression? No    Have you had a significant life event?  No     Are you feeling anxious or having panic attacks?   No    Do you have any concerns with your use of alcohol or other drugs? No    Social History     Tobacco Use     Smoking status: Never Smoker     Smokeless tobacco: Never Used   Substance Use Topics     Alcohol use: Yes     Alcohol/week: 0.0 oz     Comment: occ.     Drug use: No     PHQ 3/29/2018 4/1/2019 5/2/2019   PHQ-9 Total Score 11 16 15   Q9: Thoughts of better off dead/self-harm past 2 weeks Not at all Not at all Not at all     JELANI-7 SCORE 3/23/2017 4/1/2019 5/2/2019   Total Score - - -   Total Score 12 12 15   Assessment Five-step Evaluation and Treatment (SAFE-T)    Amount of exercise or physical activity: 2-3 days/week for an average of 15-30 minutes    Problems taking medications regularly: No    Medication side effects: none    The patient was previously taking Wellbutrin 450 mg.  Did not notice any changes in his mood therefore he stopped taking the medication altogether.  He recently started psychotherapy at the [facility].  Current symptoms are a mix of anxiety and depression.       Also has recurrent episodes of epistaxis. Tried Flonase without success.          Patient Active Problem List   Diagnosis     CARDIOVASCULAR SCREENING; LDL GOAL LESS THAN 160     Impingement syndrome, shoulder     Depression with anxiety     Elevated BP without diagnosis of hypertension     Acute midline low back pain with right-sided sciatica     History reviewed. No pertinent surgical history.    Social History     Tobacco Use     Smoking status: Never Smoker     Smokeless tobacco: Never Used   Substance Use Topics      Alcohol use: Yes     Alcohol/week: 0.0 oz     Comment: occ.     History reviewed. No pertinent family history.        Reviewed and updated as needed this visit by Provider         Review of Systems   ROS COMP: Constitutional, HEENT, cardiovascular, pulmonary, gi and gu systems are negative, except as otherwise noted.      Objective    BP (!) 151/91   Pulse 102   Temp 98.3  F (36.8  C) (Oral)   Ht 1.829 m (6')   Wt 98.2 kg (216 lb 9.6 oz)   SpO2 98%   BMI 29.38 kg/m    Body mass index is 29.38 kg/m .  Physical Exam   GENERAL: healthy, alert and no distress  RESP: lungs clear to auscultation - no rales, rhonchi or wheezes  CV: regular rate and rhythm, normal S1 S2, no S3 or S4, no murmur, click or rub, no peripheral edema and peripheral pulses strong  NEURO: Normal strength and tone, mentation intact and speech normal  PSYCH: mentation appears normal, affect normal/bright    Diagnostic Test Results:  Labs reviewed in Epic        Assessment & Plan       ICD-10-CM    1. Adjustment disorder with mixed anxiety and depressed mood F43.23 FLUoxetine (PROZAC) 20 MG capsule     hydrOXYzine (VISTARIL) 25 MG capsule     buPROPion (WELLBUTRIN XL) 150 MG 24 hr tablet   2. Epistaxis R04.0 OTOLARYNGOLOGY REFERRAL     triamcinolone (NASACORT) 55 MCG/ACT nasal aerosol        Return in about 1 month (around 6/24/2019) for Follow-up for Mental Health.    Lulu Cope MD  [facility]

## 2019-05-28 RX ORDER — TRIAMCINOLONE ACETONIDE 55 UG/1
2 SPRAY, METERED NASAL DAILY
Qty: 10.8 ML | Refills: 0 | Status: SHIPPED | OUTPATIENT
Start: 2019-05-28 | End: 2019-10-24

## 2019-05-30 ENCOUNTER — OFFICE VISIT (OUTPATIENT)
Dept: PSYCHOLOGY | Facility: CLINIC | Age: 29
End: 2019-05-30
Payer: COMMERCIAL

## 2019-05-30 DIAGNOSIS — F10.10 ALCOHOL USE DISORDER, MILD, ABUSE: ICD-10-CM

## 2019-05-30 DIAGNOSIS — F33.1 MAJOR DEPRESSIVE DISORDER, RECURRENT EPISODE, MODERATE (H): Primary | ICD-10-CM

## 2019-05-30 DIAGNOSIS — F41.1 GENERALIZED ANXIETY DISORDER: ICD-10-CM

## 2019-05-30 PROCEDURE — 90791 PSYCH DIAGNOSTIC EVALUATION: CPT | Performed by: COUNSELOR

## 2019-05-30 NOTE — Clinical Note
Braulio amin,Adam Fish completed intake appt for therapy. We will be working on depression, anxiety, and alcohol use. Please contact me with any questions or concerns. Thank you,Lexi Han MA, [facility]

## 2019-05-30 NOTE — PROGRESS NOTES
"                                                                                                                                                                      Adult Intake Structured Interview  Standard Diagnostic Assessment      CLIENT'S NAME: Adam Fish  MRN:   [id number]  :   1990  ACCT. NUMBER: [id number]  DATE OF SERVICE: 19  VIDEO VISIT: No    Identifying Information:  Client is a 29 year old, , partnered / significant other male. Client was referred for counseling by self. Client is currently employed full time. Client attended the session alone.       Client's Statement of Presenting Concern:  Client reports the reason for seeking therapy at this time as anxiety and depression - feeling overwhelmed, wanting to be more comfortable in his own skin, more calm, anxious in social settings, difficulty meeting new people, not experiencing zheng, not excited for trips or new things, hard to feel motivated, feeling void in career... Client stated that his symptoms have resulted in the following functional impairments: management of the household and or completion of tasks, organization, relationship(s), self-care, social interactions and work / vocational responsibilities.      History of Presenting Concern:  Client reports that these problem(s) began in high school for anxiety and sophomore year in college for depression after taking mushroom and experiencing a very bad panic attack. Reports feeling like experience \"rewired\" his brain, changed him, identifies as being more \"analytical\", unsure of himself, angry, fearful, and regretful that he caused something bad to happen to himself. Client has attempted to resolve these concerns in the past through medication, alcohol use, reading, exercise, biking, breathing exercises, making connections with people, taking medications. Client reports that other professional(s) are involved in providing support / services.       Social " "History:  Client reported he grew up in [place], [place]. They were the second born of 3 children. This is an intact family and parents remain . Client reported that his childhood was \"fun, easygoing, I played sports, music, hung out with friends, I had a good childhood\". Reports family spends holidays together and recalls doing well in school.  Client described his current relationships with family of origin as \"it's good, thought not overly close, but very soild\".     Client reported a history of 2 committed relationships - denied to comment on past relationships at this time. Client has been partnered / significant other for for 9 months. Client reported having 0 children. Client identified some stable and meaningful social connections. Client reported that he has not been involved with the legal system. Client's highest education level was college graduate. Client did not identify any learning problems. There are no ethnic, cultural or Confucianist factors that may be relevant for therapy. Client identified his preferred language to be English. Client reported he does not need the assistance of an  or other support involved in therapy. Modifications will not be used to assist communication in therapy. Client did not serve in the .     Client reports family history is not on file.      Mental Health History:  Client reported the following biological family members or relatives with mental health issues: Mother experienced Depression and Brother experienced Depression.  Client previously received the following mental health diagnosis: Anxiety and Depression.  Client has received the following mental health services in the past: counseling and medication(s) from physician / PCP.  Hospitalizations: None.  Client is currently receiving the following services: medication(s) from physician / PCP.      Chemical Health History:  Client reported the following biological family members or relatives " "with chemical health issues: Brothers reportedly uses alcohol . Client has not received chemical dependency treatment in the past. Client is not currently receiving any chemical dependency treatment. Client reported the following problems as a result of drinking: \"stupid things\" (e.g., getting into fights, feeling hung over, irritable, more anixous, lower mood).      Client Reports:  Client reports using alcohol 2 times per week and has 5-10 drinks at a time. Client first started drinking at age 15.  Client denies using tobacco.  Client reports using marijuana 3 times per week and smokes \"small edible or a couple hits\"  at a time. Client started using marijuana at age 13.  Client reports using caffeine 1 times per week and drinks 1 at a time. Client started using caffeine at age 17.  Client denies using street drugs. Hx of other drug use.   Client denies the non-medical use of prescription or over the counter drugs.    CAGE: C     Patient felt they ought to CUT down on your drinking (or drug use).  A     Patient felt ANNOYED by people criticizing their drinking (or drug use).  G     Patient felt bad or GUILTY about their drinking (or drug use).   Based on the positive Cage-Aid score and clinical interview there are indications of drug or alcohol abuse. Recommendation for substance abuse disorder evaluation with a substance use professional was given. Therapist did recommend client to reduce use or abstain from alcohol or substance use. Therapist did recommend structured treatment and or community support (AA, 12 step group, etc.). Client declined evaluation. Reports he wants to work on alcohol use in therapy. Will continue to assess and recommend CD evaluation as needed.    Discussed the general effects of drugs and alcohol on health and well-being. Therapist gave client printed information about the effects of chemical use on his health and well being.      Significant Losses / Trauma / Abuse / Neglect " Issues:  There are indications or report of significant loss, trauma, abuse or neglect issues related to: LSD use from college and lasting effects on his mental health.    Issues of possible neglect are not present.      Medical Issues:  Client has had a physical exam to rule out medical causes for current symptoms. Date of last physical exam was greater than a year ago and client was encouraged to schedule an exam with PCP. The client has a [facility] Primary Care Provider, who is named Jayesh Brown. The client reports not having a psychiatrist. Client reports the following current medical concerns: shoulder surgery 8/2018 and sciatica within the last year. The client reports the presence of chronic or episodic pain in the form of sciatica down right leg. The pain level is moderate and has a frequency of ongoing. There are significant nutritional concerns re: weight gain during inactivity after shoulder surgery.    Client reports current meds as:   Current Outpatient Medications   Medication Sig     buPROPion (WELLBUTRIN XL) 150 MG 24 hr tablet Take 1 tablet (150 mg) by mouth every morning     FLUoxetine (PROZAC) 20 MG capsule Take 1 capsule (20 mg) by mouth daily     hydrOXYzine (VISTARIL) 25 MG capsule Take 1 capsule (25 mg) by mouth 3 times daily as needed for anxiety     naproxen sodium 220 MG capsule Take 220 mg by mouth 2 times daily (with meals)     traZODone (DESYREL) 50 MG tablet 1-3 at hs prn sleep     triamcinolone (NASACORT) 55 MCG/ACT nasal aerosol Spray 2 sprays into both nostrils daily     No current facility-administered medications for this visit.        Client Allergies:  Allergies   Allergen Reactions     No Known Drug Allergies          Medical History:  No past medical history on file.      Medication Adherence:  Client reports taking prescribed medications as prescribed.    Client was provided recommendation to follow-up with prescribing physician.      Mental Status  Assessment:  Appearance:   Appropriate   Eye Contact:   Fair   Psychomotor Behavior: Normal   Attitude:   Cooperative   Orientation:   All  Speech   Rate / Production: Normal    Volume:  Normal   Mood:    Anxious  Depressed   Affect:    Appropriate   Thought Content:  Clear   Thought Form:  Coherent  Goal Directed  Logical   Insight:    Good       Review of Symptoms:  Depression: Sleep Interest Guilt Energy Concentration Psychomotor slowing or agitation Hopeless Helpless Worthless Ruminations Irritability  Deirdre:  No symptoms  Psychosis: No symptoms  Anxiety: Worries Nervousness  Panic:  No symptoms  Post Traumatic Stress Disorder: No symptoms  Obsessive Compulsive Disorder: No symptoms  Eating Disorder: No symptoms  Oppositional Defiant Disorder: No symptoms  ADD / ADHD: No symptoms  Conduct Disorder: No symptoms      Safety Assessment:  Client denies current fears or concerns for personal safety.  Client denies current or recent suicidal ideation or behaviors. Reports passive, fleeting thoughts about wanting to escape and not have to deal with things a couple weeks ago, but denies active plans or desire to end his life.   Client denies current or recent homicidal ideation or behaviors.  Client denies current or recent self injurious behavior or ideation.  Client denies other safety concerns.  A safety and risk management plan has not been developed at this time, however client was given the after-hours number / 911 should there be a change in any of these risk factors.  Client reports the following protective factors: positive relationships positive family connections, restricted access to lethal means, dedication to family/friends, safe and stable environment, regular physical activity, adherence with prescribed medication, living with other people, daily obligations, structured day and effective problem-solving skills.      Client's Strengths and Limitations:  Client identified the following strengths or  "resources that will help him succeed in counseling: commitment to health and well being. Client identified the following supports: dad and gf. Things that may interfere with the client's success in counseling include: \"not getting anything out of therapy, symptoms not improving\".      Diagnostic Criteria:  A. Excessive anxiety and worry about a number of events or activities (such as work or school performance).   B. The person finds it difficult to control the worry.  C. Select 3 or more symptoms (required for diagnosis). Only one item is required in children.   - Restlessness or feeling keyed up or on edge.    - Being easily fatigued.    - Difficulty concentrating or mind going blank.    - Irritability.    - Sleep disturbance (difficulty falling or staying asleep, or restless unsatisfying sleep).   D. The focus of the anxiety and worry is not confined to features of an Axis I disorder.  E. The anxiety, worry, or physical symptoms cause clinically significant distress or impairment in social, occupational, or other important areas of functioning.   F. The disturbance is not due to the direct physiological effects of a substance (e.g., a drug of abuse, a medication) or a general medical condition (e.g., hyperthyroidism) and does not occur exclusively during a Mood Disorder, a Psychotic Disorder, or a Pervasive Developmental Disorder.  A) Recurrent episode(s) - symptoms have been present during the same 2-week period and represent a change from previous functioning 5 or more symptoms (required for diagnosis)   - Depressed mood. Note: In children and adolescents, can be irritable mood.     - Diminished interest or pleasure in all, or almost all, activities.    - Disrupted sleep.    - Psychomotor activity agitation.    - Fatigue or loss of energy.    - Feelings of worthlessness or inappropriate and excessive guilt.    - Diminished ability to think or concentrate, or indecisiveness.   B) The symptoms cause clinically " significant distress or impairment in social, occupational, or other important areas of functioning  C) The episode is not attributable to the physiological effects of a substance or to another medical condition  D) The occurence of major depressive episode is not better explained by other thought / psychotic disorders  E) There has never been a manic episode or hypomanic episode  Excessive alcohol use, negative consequences as a result of alcohol use, mood and anxiety worsens with alcohol use, feels guilt and that he needs to cut down on drinking, father has complained to him and brothers about alcohol use      Functional Status:  Client's symptoms have caused reduced functional status in the following areas: Activities of Daily Living, Occupational / Vocational, and Social / Relational.      DSM5 Diagnoses: (Sustained by DSM5 Criteria Listed Above)  Diagnoses: 296.32 (F33.1) Major Depressive Disorder, Recurrent Episode, Moderate & 300.02 (F41.1) Generalized Anxiety Disorder & Alcohol Use Disorder, Mild; RULE .23 (F40.10) Social Anxiety Disorder  Psychosocial & Contextual Factors: Lack of reward at work, limited social support re: mental/emotional needs  WHODAS 2.0 (12 item)            This questionnaire asks about difficulties due to health conditions. Health conditions  include  disease or illnesses, other health problems that may be short or long lasting,  injuries, mental health or emotional problems, and problems with alcohol or drugs.                     Think back over the past 30 days and answer these questions, thinking about how much  difficulty you had doing the following activities. For each question, please [place] only  one response.    S1 Standing for long periods such as 30 minutes? Mild =           2   S2 Taking care of household responsibilities? Moderate =   3   S3 Learning a new task, for example, learning how to get to a new place? Mild =           2   S4 How much of a problem do you have  joining community activities (for example, festivals, Confucianism or other activities) in the same way as anyone else can? None =         1   S5 How much have you been emotionally affected by your health problems? Mild =           2     In the past 30 days, how much difficulty did you have in:   S6 Concentrating on doing something for ten minutes? Moderate =   3   S7 Walking a long distance such as a kilometer (or equivalent)? None =         1   S8 Washing your whole body? None =         1   S9 Getting dressed? None =         1   S10 Dealing with people you do not know? Severe =       4   S11 Maintaining a friendship? Mild =           2   S12 Your day to day work? Mild =           2     H1 Overall, in the past 30 days, how many days were these difficulties present? Record number of days 20   H2 In the past 30 days, for how many days were you totally unable to carry out your usual activities or work because of any health condition? Record number of days 0   H3 In the past 30 days, not counting the days that you were totally unable, for how many days did you cut back or reduce your usual activities or work because of any health condition? Record number of days 15     Attendance Agreement:  Client has signed Attendance Agreement:Yes      Collaboration:  The client is receiving treatment / structured support from the following professional(s) / service and treatment. Collaboration will be initiated with: primary care physician.        Preliminary Treatment Plan:  The client reports no currently identified Confucianism, ethnic or cultural issues relevant to therapy.     services are not indicated.    Modifications to assist communication are not indicated.    The concerns identified by the client will be addressed in therapy.    Initial Treatment will focus on: Depressed Mood, Anxiety, and Alcohol / Substance Use.    As a preliminary treatment goal, client will experience a reduction in depressed mood, will develop  more effective coping skills to manage depressive symptoms, will develop healthy cognitive patterns and beliefs, will increase ability to function adaptively and will continue to take medications as prescribed / participate in supportive activities and services , will experience a reduction in anxiety, will develop more effective coping skills to manage anxiety symptoms, will develop healthy cognitive patterns and beliefs and will increase ability to function adaptively and will increase understanding of the effects of substance use  will make healthier choices in regard to substance use  will discuss/consider potential need for formal substance use evaluation, treatment and/or support  continue to make healthy choices regarding substance use and engage in activities / supportive services that promote sobriety.    The focus of initial interventions will be to alleviate anxiety, alleviate depressed mood, facilitate appropriate expression of feelings, increase ability to function adaptively, increase coping skills, increase self esteem, provide homework to reinforce skill development, teach CBT skills, teach communication skills, teach conflict management skills, teach DBT skills, teach distress tolerance skills, teach emotional regulation, teach mindfulness skills, teach problem-solving skills, teach relaxation strategies, teach sleep hygiene, teach social skills and teach stress mangement techniques.    The following referral(s) was/were discussed but client declines follow up at this time. chemical dependency evaluation.    A Release of Information is not needed at this time.    Report to child / adult protection services was NA.    Client will have access to their [facility]' medical record.      Lexi Han [facility] Center[facility]  May 30, 2019

## 2019-06-18 DIAGNOSIS — F43.23 ADJUSTMENT DISORDER WITH MIXED ANXIETY AND DEPRESSED MOOD: ICD-10-CM

## 2019-06-18 RX ORDER — BUPROPION HYDROCHLORIDE 150 MG/1
150 TABLET ORAL EVERY MORNING
Qty: 60 TABLET | Refills: 0 | Status: CANCELLED | OUTPATIENT
Start: 2019-06-18

## 2019-06-18 NOTE — TELEPHONE ENCOUNTER
Wellbutrin:  Too early; Rx sent 5/24/2019 for 2 months    Fluoxetine:  Prescription approved per [facility] Refill Protocol.  Pt has upcoming appt    Mayela POLLARD RN

## 2019-06-18 NOTE — TELEPHONE ENCOUNTER
buPROPion (WELLBUTRIN XL) 150 MG 24 hr tablet  Last Written Prescription Date:  05/24/2019  Last Fill Quantity: 60,  # refills: 0   Last office visit: 5/24/2019 with prescribing provider:  JALEN   Future Office Visit: 07/01/2019 WITH JALEN  Next 5 appointments (look out 90 days)    Jun 19, 2019 10:00 AM CDT  Return Visit with Lexi Han [facility] ([facility]) [facility]  [address] [place] [address]  [phone]   Jul 01, 2019 11:40 AM CDT  MyChart Short with Lulu Cope MD  [facility] ([facility] [address] [facility] [address]  [phone]   Jul 10, 2019  3:30 PM CDT  Return Visit with Lexi Han [facility] ([facility]) [facility]  [address] [place] [address]  [phone]   Jul 26, 2019 11:00 AM CDT  Return Visit with Lexi Han [facility] ([facility]) [facility]  [address] [place] [address]  [phone]   Aug 07, 2019  1:30 PM CDT  Return Visit with Lexi Han [facility] ([facility]) [facility]  [address] [place] [address]  [phone]       FLUoxetine (PROZAC) 20 MG capsule  Last Written Prescription Date:  05/24/2019  Last Fill Quantity: 30,  # refills: 0   Last office visit: 5/24/2019 with prescribing provider:  JALEN   Future Office Visit: 07/01/2019 WITH JALEN Menjivar 5 appointments (look out 90 days)    Jun 19, 2019 10:00 AM CDT  Return Visit with Lexi Han [facility] ([facility]) [facility]  [address] [place] [id number]  [phone]   Jul 01, 2019 11:40 AM CDT  MyChart Short with Lulu Cope MD  [facility] [address]  [facility]  [address]  [phone]   Jul 10, 2019  3:30 PM CDT  Return Visit with Lexi Han [facility] ([facility]  [address] [facility] [address]  [facility] [address]  [phone]   Jul 26, 2019 11:00 AM CDT  Return Visit with Lexi Han [facility] ([facility]  [address]  [facility] [address]  [phone]   Aug 07, 2019  1:30 PM CDT  Return Visit with Lexi Han [facility] ([facility]  [address]  [facility] [address]  [phone]

## 2019-06-19 ENCOUNTER — OFFICE VISIT (OUTPATIENT)
Dept: PSYCHOLOGY | Facility: CLINIC | Age: 29
End: 2019-06-19
Payer: COMMERCIAL

## 2019-06-19 DIAGNOSIS — F41.1 GENERALIZED ANXIETY DISORDER: ICD-10-CM

## 2019-06-19 DIAGNOSIS — F33.1 MAJOR DEPRESSIVE DISORDER, RECURRENT EPISODE, MODERATE (H): Primary | ICD-10-CM

## 2019-06-19 PROCEDURE — 90834 PSYTX W PT 45 MINUTES: CPT | Performed by: COUNSELOR

## 2019-06-19 ASSESSMENT — PATIENT HEALTH QUESTIONNAIRE - PHQ9: 5. POOR APPETITE OR OVEREATING: NEARLY EVERY DAY

## 2019-06-19 ASSESSMENT — ANXIETY QUESTIONNAIRES
5. BEING SO RESTLESS THAT IT IS HARD TO SIT STILL: SEVERAL DAYS
7. FEELING AFRAID AS IF SOMETHING AWFUL MIGHT HAPPEN: NOT AT ALL
IF YOU CHECKED OFF ANY PROBLEMS ON THIS QUESTIONNAIRE, HOW DIFFICULT HAVE THESE PROBLEMS MADE IT FOR YOU TO DO YOUR WORK, TAKE CARE OF THINGS AT HOME, OR GET ALONG WITH OTHER PEOPLE: SOMEWHAT DIFFICULT
2. NOT BEING ABLE TO STOP OR CONTROL WORRYING: MORE THAN HALF THE DAYS
6. BECOMING EASILY ANNOYED OR IRRITABLE: SEVERAL DAYS
1. FEELING NERVOUS, ANXIOUS, OR ON EDGE: NEARLY EVERY DAY
3. WORRYING TOO MUCH ABOUT DIFFERENT THINGS: MORE THAN HALF THE DAYS
GAD7 TOTAL SCORE: 12

## 2019-06-19 NOTE — PROGRESS NOTES
"                                           Progress Note    Client Name: Adam Fish  Date: 6/19/2019         Service Type: Individual  Video Visit: No     Session Start Time: 10:00a  Session End Time: 10:45a     Session Length: 45 minute    Session #: 3    Attendees: Client attended alone     Treatment Plan Last Reviewed: In progress  PHQ-9 / JELANI-7: 13 & 12      DATA  Interactive Complexity: No  Crisis: No         Progress Since Last Session (Related to Symptoms / Goals / Homework):   Symptoms: No change - see Epic for PHQ 9 and JELANI 7 updates    Homework: n/a      Episode of Care Goals: Minimal progress - PREPARATION (Decided to change - considering how); Intervened by negotiating a change plan and determining options / strategies for behavior change, identifying triggers, exploring social supports, and working towards setting a date to begin behavior change     Current / Ongoing Stressors and Concerns:   Long standing self consciousness since 5th grade after being teased about picking his nose by peers; currently feels the most comfortable and confident with friends; reported spiraling negative self talk and cognition, \"I am not enough, I am awkward\"; noticed that negative thoughts affect how he engages with others and pursues/does not pursue goals.     Treatment Objective(s) Addressed in This Session:   In progress     Intervention:  CBT: assess maladaptive patterns of behavior and cognition, teach about ABT of CBT    Motivational Interviewing: assess and address ambivalence towards change and readiness and willingness to change, evoke change talk, challenge sustain talk, point out discrepancies, explore ambivalence towards change and road blocks        ASSESSMENT: Current Emotional / Mental Status (status of significant symptoms):   Risk status (Self / Other harm or suicidal ideation)  Client denies current fears or concerns for personal safety.  Client denies current or recent suicidal ideation or " behaviors. Reports passive, fleeting thoughts about wanting to escape and not have to deal with things a couple weeks ago, but denies active plans or desire to end his life.   Client denies current or recent homicidal ideation or behaviors.  Client denies current or recent self injurious behavior or ideation.  Client denies other safety concerns.  Client reports the following protective factors: positive relationships positive family connections, restricted access to lethal means, dedication to family/friends, safe and stable environment, regular physical activity, adherence with prescribed medication, living with other people, daily obligations, structured day and effective problem-solving skills.    Patient reports there has been no change in risk factors since their last session.     Patient reports there has been no change in protective factors since their last session.     Recommended that patient call 911 or go to the local ED should there be a change in any of these risk factors.     Appearance:   Appropriate    Eye Contact:   Fair    Psychomotor Behavior: Normal    Attitude:   Cooperative    Orientation:   All   Speech    Rate / Production: Normal     Volume:  Normal    Mood:    Anxious  Depressed    Affect:    Appropriate  Mood congruent    Thought Content:  Clear  Some rumination    Thought Form:  Coherent  Goal Directed  Logical    Insight:    Good      Medication Review:   See Epic for updates     Medication Compliance:   Yes     Changes in Health Issues:   None reported     Chemical Use Review:   Substance Use: Problem use continues with no change since last session, Patient declined discussion at this time        Tobacco Use: No current tobacco use      Diagnosis:  1. Major depressive disorder, recurrent episode, moderate (H)    2. Generalized anxiety disorder        Collateral Reports Completed:   Not Applicable      PLAN: (Client Tasks / Therapist Tasks / Other)  Start addressing issues identified on  "treatment plan.    By next appt, client will assess patterns of thoughts, behaviors, and emotions.         Lexi Han, [facility]                                                         ______________________________________________________________________    Treatment Plan    Client's Name: Adam Fish  YOB: 1990    Date: 6/19/2019    Diagnoses: 296.32 (F33.1) Major Depressive Disorder, Recurrent Episode, Moderate & 300.02 (F41.1) Generalized Anxiety Disorder & Alcohol Use Disorder, Mild; RULE .23 (F40.10) Social Anxiety Disorder  Psychosocial & Contextual Factors: Lack of reward at work, limited social support re: mental/emotional needs  WHODAS: 24    Referral / Collaboration:  Referral to another professional/service is not indicated at this time.    Anticipated number of session or this episode of care: 3-4 months      MeasurableTreatment Goal(s) related to diagnosis / functional impairment(s)  Goal 1: Client will improve mood and alcohol related issues as evidenced by decreased score on PHQ 9 from 15 (moderately severe) to 10 or less (moderate-mild).    I will know I've met my goal when I feel more comfortable and not doubt myself.      Objective #A (Client Action)    Client will decrease frequency and intensity of feeling down, depressed, hopeless as evidenced by checking perspectives, not comparing, and developing self awareness to \"figure out why I feel the way I feel and to be more comfortable in my own skin\".  Status: New - Date: 6/19/2019     Intervention(s)  Therapist will assign homework of cognitive, mood, and behavioral tracking/journaling; provide educational materials on cognitive distortions; role-play conflict management; teach the client how to perform a behavioral chain analysis.    Goal 2: Client will better manage anxiety as evidenced by decreased score on JELANI 7 from 15 (severe) to 10 or less (moderate).    I will know I've met my goal when I do not avoid people, stop over " "thinking, and speak up more.      Objective #A (Client Action)    Client will learn 3 anxiety management and assertiveness skills to \"get through daily interactions confidently and comfortably, with less anxiety\".  Status: New - Date: 6/19/2019     Intervention(s)  Therapist will assign homework of skill practice; provide educational materials on distraction, relaxation, and assertiveness skills; role-play conflict management; teach emotional recognition/identification.      Patient has reviewed and agreed to the above plan.      IOANA Pearson  June 19, 2019  "

## 2019-06-20 ASSESSMENT — PATIENT HEALTH QUESTIONNAIRE - PHQ9: SUM OF ALL RESPONSES TO PHQ QUESTIONS 1-9: 13

## 2019-06-20 ASSESSMENT — ANXIETY QUESTIONNAIRES: GAD7 TOTAL SCORE: 12

## 2019-07-01 ENCOUNTER — OFFICE VISIT (OUTPATIENT)
Dept: FAMILY MEDICINE | Facility: CLINIC | Age: 29
End: 2019-07-01
Payer: COMMERCIAL

## 2019-07-01 VITALS
HEIGHT: 72 IN | TEMPERATURE: 98.3 F | RESPIRATION RATE: 16 BRPM | WEIGHT: 214 LBS | HEART RATE: 58 BPM | SYSTOLIC BLOOD PRESSURE: 149 MMHG | BODY MASS INDEX: 28.99 KG/M2 | OXYGEN SATURATION: 98 % | DIASTOLIC BLOOD PRESSURE: 87 MMHG

## 2019-07-01 DIAGNOSIS — G89.29 CHRONIC BILATERAL LOW BACK PAIN WITH RIGHT-SIDED SCIATICA: ICD-10-CM

## 2019-07-01 DIAGNOSIS — F43.23 ADJUSTMENT DISORDER WITH MIXED ANXIETY AND DEPRESSED MOOD: Primary | ICD-10-CM

## 2019-07-01 DIAGNOSIS — M54.41 CHRONIC BILATERAL LOW BACK PAIN WITH RIGHT-SIDED SCIATICA: ICD-10-CM

## 2019-07-01 DIAGNOSIS — R03.0 ELEVATED BLOOD PRESSURE READING WITHOUT DIAGNOSIS OF HYPERTENSION: ICD-10-CM

## 2019-07-01 PROCEDURE — 99214 OFFICE O/P EST MOD 30 MIN: CPT | Performed by: FAMILY MEDICINE

## 2019-07-01 RX ORDER — BUPROPION HYDROCHLORIDE 150 MG/1
150 TABLET ORAL EVERY MORNING
Qty: 90 TABLET | Refills: 0 | Status: SHIPPED | OUTPATIENT
Start: 2019-07-01 | End: 2019-09-25

## 2019-07-01 RX ORDER — HYDROXYZINE PAMOATE 25 MG/1
25 CAPSULE ORAL 3 TIMES DAILY PRN
Qty: 60 CAPSULE | Refills: 0 | Status: SHIPPED | OUTPATIENT
Start: 2019-07-01 | End: 2019-09-25

## 2019-07-01 RX ORDER — GABAPENTIN 300 MG/1
300 CAPSULE ORAL 3 TIMES DAILY PRN
Qty: 90 CAPSULE | Refills: 0 | Status: SHIPPED | OUTPATIENT
Start: 2019-07-01 | End: 2019-07-31

## 2019-07-01 ASSESSMENT — ANXIETY QUESTIONNAIRES
3. WORRYING TOO MUCH ABOUT DIFFERENT THINGS: MORE THAN HALF THE DAYS
1. FEELING NERVOUS, ANXIOUS, OR ON EDGE: MORE THAN HALF THE DAYS
6. BECOMING EASILY ANNOYED OR IRRITABLE: MORE THAN HALF THE DAYS
5. BEING SO RESTLESS THAT IT IS HARD TO SIT STILL: MORE THAN HALF THE DAYS
GAD7 TOTAL SCORE: 13
2. NOT BEING ABLE TO STOP OR CONTROL WORRYING: MORE THAN HALF THE DAYS
7. FEELING AFRAID AS IF SOMETHING AWFUL MIGHT HAPPEN: SEVERAL DAYS
IF YOU CHECKED OFF ANY PROBLEMS ON THIS QUESTIONNAIRE, HOW DIFFICULT HAVE THESE PROBLEMS MADE IT FOR YOU TO DO YOUR WORK, TAKE CARE OF THINGS AT HOME, OR GET ALONG WITH OTHER PEOPLE: SOMEWHAT DIFFICULT

## 2019-07-01 ASSESSMENT — PATIENT HEALTH QUESTIONNAIRE - PHQ9
5. POOR APPETITE OR OVEREATING: MORE THAN HALF THE DAYS
SUM OF ALL RESPONSES TO PHQ QUESTIONS 1-9: 12

## 2019-07-01 ASSESSMENT — MIFFLIN-ST. JEOR: SCORE: 1968.7

## 2019-07-01 NOTE — NURSING NOTE
Chief Complaint   Patient presents with     Depression     /87   Pulse 58   Temp 98.3  F (36.8  C) (Oral)   Resp 16   Ht 1.829 m (6')   Wt 97.1 kg (214 lb)   SpO2 98%   BMI 29.02 kg/m   Estimated body mass index is 29.02 kg/m  as calculated from the following:    Height as of this encounter: 1.829 m (6').    Weight as of this encounter: 97.1 kg (214 lb).  bp completed using cuff size: regular       Health Maintenance addressed:  NONE    n/a    Princess Nunez MA

## 2019-07-01 NOTE — PROGRESS NOTES
Subjective     Adam Fish is a 29 year old male who presents to clinic today for the following health issues:    HPI   Depression and Anxiety Follow-Up    How are you doing with your depression since your last visit? Stable    How are you doing with your anxiety since your last visit?  Stable     Are you having other symptoms that might be associated with depression or anxiety? No    Have you had a significant life event? No     Do you have any concerns with your use of alcohol or other drugs? No    Social History     Tobacco Use     Smoking status: Never Smoker     Smokeless tobacco: Never Used   Substance Use Topics     Alcohol use: Yes     Alcohol/week: 0.0 oz     Comment: occ.     Drug use: No     PHQ 5/2/2019 6/19/2019 7/1/2019   PHQ-9 Total Score 15 13 12   Q9: Thoughts of better off dead/self-harm past 2 weeks Not at all Not at all Not at all     JELANI-7 SCORE 5/2/2019 6/19/2019 7/1/2019   Total Score - - -   Total Score 15 12 13     Suicide Assessment Five-step Evaluation and Treatment (SAFE-T)    Amount of exercise or physical activity: 4-5 days/week for an average of 30-45 minutes    Problems taking medications regularly: No    Medication side effects: none    Diet: regular (no restrictions)    Patient returned from vacation recently.  Depressive anxiety symptoms continues to be the same.  It is mostly depressive symptoms.  He has been taking Prozac but noticed a sexual side effects.  Although his PHQ 9 is elevated he does not desire any adjustment in his dose.      Also, he has a history of Sciatica.  He has lower back pain that radiates to the right gluteal area in the right lower extremity.  He was seen and evaluated by physical therapy in the past and had steroid injections into that area as well.    Patient Active Problem List   Diagnosis     CARDIOVASCULAR SCREENING; LDL GOAL LESS THAN 160     Impingement syndrome, shoulder     Depression with anxiety     Elevated BP without diagnosis of  hypertension     Acute midline low back pain with right-sided sciatica     History reviewed. No pertinent surgical history.    Social History     Tobacco Use     Smoking status: Never Smoker     Smokeless tobacco: Never Used   Substance Use Topics     Alcohol use: Yes     Alcohol/week: 0.0 oz     Comment: occ.     History reviewed. No pertinent family history.        Reviewed and updated as needed this visit by Provider         Review of Systems   ROS COMP: Constitutional, HEENT, cardiovascular, pulmonary, gi and gu systems are negative, except as otherwise noted.      Objective    /87   Pulse 58   Temp 98.3  F (36.8  C) (Oral)   Resp 16   Ht 1.829 m (6')   Wt 97.1 kg (214 lb)   SpO2 98%   BMI 29.02 kg/m    Body mass index is 29.02 kg/m .  Physical Exam   GENERAL: healthy, alert and no distress  RESP: lungs clear to auscultation - no rales, rhonchi or wheezes  CV: regular rate and rhythm, normal S1 S2, no S3 or S4, no murmur, click or rub, no peripheral edema and peripheral pulses strong  MS: no gross musculoskeletal defects noted, no edema    Diagnostic Test Results:  Labs reviewed in Epic        Assessment & Plan       ICD-10-CM    1. Adjustment disorder with mixed anxiety and depressed mood F43.23 FLUoxetine (PROZAC) 20 MG capsule     hydrOXYzine (VISTARIL) 25 MG capsule     buPROPion (WELLBUTRIN XL) 150 MG 24 hr tablet   2. Elevated blood pressure reading without diagnosis of hypertension R03.0    3. Chronic bilateral low back pain with right-sided sciatica M54.41 gabapentin (NEURONTIN) 300 MG capsule    G89.29      PHQ 9 and vazquez 7 are significantly elevated.  The patient was started on gabapentin for sciatic pain as well as mood.  He was also started on Wellbutrin 150 mg once daily for depressive symptoms.     Return in about 3 months (around 10/1/2019) for Follow-up for Mental Health.    Lulu Cope MD  [facility]

## 2019-07-02 ASSESSMENT — ANXIETY QUESTIONNAIRES: GAD7 TOTAL SCORE: 13

## 2019-07-10 ENCOUNTER — OFFICE VISIT (OUTPATIENT)
Dept: PSYCHOLOGY | Facility: CLINIC | Age: 29
End: 2019-07-10
Payer: COMMERCIAL

## 2019-07-10 DIAGNOSIS — F41.1 GENERALIZED ANXIETY DISORDER: ICD-10-CM

## 2019-07-10 DIAGNOSIS — F33.1 MAJOR DEPRESSIVE DISORDER, RECURRENT EPISODE, MODERATE (H): Primary | ICD-10-CM

## 2019-07-10 PROCEDURE — 90834 PSYTX W PT 45 MINUTES: CPT | Performed by: COUNSELOR

## 2019-07-10 ASSESSMENT — ANXIETY QUESTIONNAIRES
2. NOT BEING ABLE TO STOP OR CONTROL WORRYING: MORE THAN HALF THE DAYS
6. BECOMING EASILY ANNOYED OR IRRITABLE: SEVERAL DAYS
1. FEELING NERVOUS, ANXIOUS, OR ON EDGE: MORE THAN HALF THE DAYS
3. WORRYING TOO MUCH ABOUT DIFFERENT THINGS: MORE THAN HALF THE DAYS
7. FEELING AFRAID AS IF SOMETHING AWFUL MIGHT HAPPEN: NOT AT ALL
GAD7 TOTAL SCORE: 9
5. BEING SO RESTLESS THAT IT IS HARD TO SIT STILL: SEVERAL DAYS
IF YOU CHECKED OFF ANY PROBLEMS ON THIS QUESTIONNAIRE, HOW DIFFICULT HAVE THESE PROBLEMS MADE IT FOR YOU TO DO YOUR WORK, TAKE CARE OF THINGS AT HOME, OR GET ALONG WITH OTHER PEOPLE: SOMEWHAT DIFFICULT

## 2019-07-10 ASSESSMENT — PATIENT HEALTH QUESTIONNAIRE - PHQ9
SUM OF ALL RESPONSES TO PHQ QUESTIONS 1-9: 11
5. POOR APPETITE OR OVEREATING: SEVERAL DAYS

## 2019-07-10 NOTE — PROGRESS NOTES
"                                           Progress Note    Client Name: Adam Fish  Date: 7/10/2019         Service Type: Individual  Video Visit: No     Session Start Time: 3:30p  Session End Time: 4:15p     Session Length: 45 minute    Session #: 4    Attendees: Client attended alone     Treatment Plan Last Reviewed: 7/10/2019  PHQ-9 / JELANI-7: 11 & 9      DATA  Interactive Complexity: No  Crisis: No         Progress Since Last Session (Related to Symptoms / Goals / Homework):   Symptoms: No change - see Epic for PHQ 9 and JELANI 7 updates    Homework: Partially completed - will assess patterns of thoughts, behaviors, and emotions.       Episode of Care Goals: Minimal progress - PREPARATION (Decided to change - considering how); Intervened by negotiating a change plan and determining options / strategies for behavior change, identifying triggers, exploring social supports, and working towards setting a date to begin behavior change     Current / Ongoing Stressors and Concerns:   Reported having a good past couple of weeks due to being on vacation and not having many social interactions; ongoing self consciousness, negative self talk, apathy, feeling overwhelmed, and struggling to participate in work conversations (not sure what to say, wanting to sound smart, unable to think on his feet); also acknowledged feeling overwhelmed during therapy and feels he has a lot of questions when the session is over.          Treatment Objective(s) Addressed in This Session:    Client will decrease frequency and intensity of feeling down, depressed, hopeless as evidenced by checking perspectives, not comparing, and developing self awareness to \"figure out why I feel the way I feel and to be more comfortable in my own skin\". Client will learn 3 anxiety management and assertiveness skills to \"get through daily interactions confidently and comfortably, with less anxiety\".     Intervention:  CBT: assess maladaptive patterns of " behavior and cognition, teach about ABT of CBT    Motivational Interviewing: assess and address ambivalence towards change and readiness and willingness to change, evoke change talk, challenge sustain talk, point out discrepancies, explore ambivalence towards change and road blocks, teach about OARS for interpersonal effectiveness  DBT: teach emotion identification, introduce core mindfulness skills         ASSESSMENT: Current Emotional / Mental Status (status of significant symptoms):   Risk status (Self / Other harm or suicidal ideation)  Client denies current fears or concerns for personal safety.  Client denies current or recent suicidal ideation or behaviors. Reports passive, fleeting thoughts about wanting to escape and not have to deal with things a couple weeks ago, but denies active plans or desire to end his life.   Client denies current or recent homicidal ideation or behaviors.  Client denies current or recent self injurious behavior or ideation.  Client denies other safety concerns.  Client reports the following protective factors: positive relationships positive family connections, restricted access to lethal means, dedication to family/friends, safe and stable environment, regular physical activity, adherence with prescribed medication, living with other people, daily obligations, structured day and effective problem-solving skills.    Patient reports there has been no change in risk factors since their last session.     Patient reports there has been no change in protective factors since their last session.     Recommended that patient call 911 or go to the local ED should there be a change in any of these risk factors.     Appearance:   Appropriate    Eye Contact:   Fair    Psychomotor Behavior: Normal    Attitude:   Cooperative    Orientation:   All   Speech    Rate / Production: Normal     Volume:  Normal    Mood:    Anxious  Depressed    Affect:    Appropriate  Mood congruent    Thought  Content:  Clear  Some rumination    Thought Form:  Coherent  Goal Directed  Logical    Insight:    Fair      Medication Review:   See Epic for updates     Medication Compliance:   Yes     Changes in Health Issues:   None reported     Chemical Use Review:   Substance Use: Problem use continues with no change since last session, Patient declined discussion at this time        Tobacco Use: No current tobacco use      Diagnosis:   296.32 (F33.1) Major Depressive Disorder, Recurrent Episode, Moderate & 300.02 (F41.1)  Generalized Anxiety Disorder     Collateral Reports Completed:   Not Applicable      PLAN: (Client Tasks / Therapist Tasks / Other)  Therapist will assign homework of skill practice; provide educational materials on distraction, relaxation, and assertiveness skills; role-play conflict management; teach emotional recognition/identification.    By next appt, client will work to prepare for group social interactions at work and contribute his thoughts as appropriate.        Lexi Han [facility]                                                         ______________________________________________________________________    Treatment Plan    Client's Name: Adam Fish  YOB: 1990    Date: 6/19/2019    Diagnoses: 296.32 (F33.1) Major Depressive Disorder, Recurrent Episode, Moderate & 300.02 (F41.1) Generalized Anxiety Disorder & Alcohol Use Disorder, Mild; RULE .23 (F40.10) Social Anxiety Disorder  Psychosocial & Contextual Factors: Lack of reward at work, limited social support re: mental/emotional needs  WHODAS: 24    Referral / Collaboration:  Referral to another professional/service is not indicated at this time.    Anticipated number of session or this episode of care: 3-4 months      MeasurableTreatment Goal(s) related to diagnosis / functional impairment(s)  Goal 1: Client will improve mood and alcohol related issues as evidenced by decreased score on PHQ 9 from 15 (moderately severe)  "to 10 or less (moderate-mild).    I will know I've met my goal when I feel more comfortable and not doubt myself.      Objective #A (Client Action)    Client will decrease frequency and intensity of feeling down, depressed, hopeless as evidenced by checking perspectives, not comparing, and developing self awareness to \"figure out why I feel the way I feel and to be more comfortable in my own skin\".  Status: New - Date: 6/19/2019     Intervention(s)  Therapist will assign homework of cognitive, mood, and behavioral tracking/journaling; provide educational materials on cognitive distortions; role-play conflict management; teach the client how to perform a behavioral chain analysis.    Goal 2: Client will better manage anxiety as evidenced by decreased score on JELANI 7 from 15 (severe) to 10 or less (moderate).    I will know I've met my goal when I do not avoid people, stop over thinking, and speak up more.      Objective #A (Client Action)    Client will learn 3 anxiety management and assertiveness skills to \"get through daily interactions confidently and comfortably, with less anxiety\".  Status: New - Date: 6/19/2019     Intervention(s)  Therapist will assign homework of skill practice; provide educational materials on distraction, relaxation, and assertiveness skills; role-play conflict management; teach emotional recognition/identification.      Patient has reviewed and agreed to the above plan.      IOANA Pearson  June 19, 2019  "

## 2019-07-11 ASSESSMENT — ANXIETY QUESTIONNAIRES: GAD7 TOTAL SCORE: 9

## 2019-07-26 ENCOUNTER — OFFICE VISIT (OUTPATIENT)
Dept: PSYCHOLOGY | Facility: CLINIC | Age: 29
End: 2019-07-26
Payer: COMMERCIAL

## 2019-07-26 DIAGNOSIS — F33.1 MAJOR DEPRESSIVE DISORDER, RECURRENT EPISODE, MODERATE (H): Primary | ICD-10-CM

## 2019-07-26 DIAGNOSIS — F41.1 GENERALIZED ANXIETY DISORDER: ICD-10-CM

## 2019-07-26 PROCEDURE — 90834 PSYTX W PT 45 MINUTES: CPT | Performed by: COUNSELOR

## 2019-07-26 ASSESSMENT — ANXIETY QUESTIONNAIRES
GAD7 TOTAL SCORE: 7
1. FEELING NERVOUS, ANXIOUS, OR ON EDGE: SEVERAL DAYS
5. BEING SO RESTLESS THAT IT IS HARD TO SIT STILL: SEVERAL DAYS
6. BECOMING EASILY ANNOYED OR IRRITABLE: SEVERAL DAYS
7. FEELING AFRAID AS IF SOMETHING AWFUL MIGHT HAPPEN: SEVERAL DAYS
3. WORRYING TOO MUCH ABOUT DIFFERENT THINGS: SEVERAL DAYS
IF YOU CHECKED OFF ANY PROBLEMS ON THIS QUESTIONNAIRE, HOW DIFFICULT HAVE THESE PROBLEMS MADE IT FOR YOU TO DO YOUR WORK, TAKE CARE OF THINGS AT HOME, OR GET ALONG WITH OTHER PEOPLE: SOMEWHAT DIFFICULT
2. NOT BEING ABLE TO STOP OR CONTROL WORRYING: SEVERAL DAYS

## 2019-07-26 ASSESSMENT — PATIENT HEALTH QUESTIONNAIRE - PHQ9
SUM OF ALL RESPONSES TO PHQ QUESTIONS 1-9: 8
5. POOR APPETITE OR OVEREATING: SEVERAL DAYS

## 2019-07-26 NOTE — PROGRESS NOTES
"                                           Progress Note    Client Name: Adam Fish  Date: 7/26/2019         Service Type: Individual  Video Visit: No     Session Start Time: 11:00a  Session End Time: 11:45a     Session Length: 45 minute    Session #: 5    Attendees: Client attended alone     Treatment Plan Last Reviewed: 7/10/2019  PHQ-9 / JELANI-7: 8 & 7      DATA  Interactive Complexity: No  Crisis: No         Progress Since Last Session (Related to Symptoms / Goals / Homework):   Symptoms: Improving - see Epic for PHQ 9 and JELANI 7 updates    Homework: Completed - will work to prepare for group social interactions at work and contribute his thoughts as appropriate.      Episode of Care Goals: Satisfactory progress - ACTION (Actively working towards change); Intervened by reinforcing change plan / affirming steps taken     Current / Ongoing Stressors and Concerns:   Reported some improvement in mood and anxiety due to multiple vacations, skill practice and feeling good with results (feeling connected with coworker communication attempts), and connecting with father about depression; reported coming to learn that depression is the center for most of his issues and increases his anxiety; acknowledged tendency to suppress/avoid/distract and a need to connect and be more open about talking about depression; inquired about how to manage intense feelings of shame in the moment while he is with others.          Treatment Objective(s) Addressed in This Session:    Client will decrease frequency and intensity of feeling down, depressed, hopeless as evidenced by checking perspectives, not comparing, and developing self awareness to \"figure out why I feel the way I feel and to be more comfortable in my own skin\". Client will learn 3 anxiety management and assertiveness skills to \"get through daily interactions confidently and comfortably, with less anxiety\".     Intervention:  CBT: assess maladaptive patterns of behavior " and cognition, teach about ABT of CBT, teach challenging negative self talk, teach communication skills for social-emotional support  Motivational Interviewing: assess and address ambivalence towards change and readiness and willingness to change, evoke change talk, challenge sustain talk, point out discrepancies, explore ambivalence towards change and road blocks, teach about OARS for interpersonal effectiveness  DBT: teach emotion identification, reinforce core mindfulness skills, teach TIPP skills for emotions regulation, teach scaling questions for emotion identification          ASSESSMENT: Current Emotional / Mental Status (status of significant symptoms):   Risk status (Self / Other harm or suicidal ideation)  Client denies current fears or concerns for personal safety.  Client denies current or recent suicidal ideation or behaviors. Reports passive, fleeting thoughts about wanting to escape and not have to deal with things a couple weeks ago, but denies active plans or desire to end his life.   Client denies current or recent homicidal ideation or behaviors.  Client denies current or recent self injurious behavior or ideation.  Client denies other safety concerns.  Client reports the following protective factors: positive relationships positive family connections, restricted access to lethal means, dedication to family/friends, safe and stable environment, regular physical activity, adherence with prescribed medication, living with other people, daily obligations, structured day and effective problem-solving skills.    Patient reports there has been no change in risk factors since their last session.     Patient reports there has been no change in protective factors since their last session.     Recommended that patient call 911 or go to the local ED should there be a change in any of these risk factors.     Appearance:   Appropriate    Eye Contact:   Fair    Psychomotor Behavior: Normal  "   Attitude:   Cooperative    Orientation:   All   Speech    Rate / Production: Normal     Volume:  Normal    Mood:    Anxious  Depressed \"A little better\"   Affect:    Appropriate  Mood congruent    Thought Content:  Clear    Thought Form:  Coherent  Goal Directed  Logical    Insight:    Good      Medication Review:   See Epic for updates     Medication Compliance:   Yes     Changes in Health Issues:   None reported     Chemical Use Review:   Substance Use: Problem use continues with no change since last session, Patient declined discussion at this time        Tobacco Use: No current tobacco use      Diagnosis:   296.32 (F33.1) Major Depressive Disorder, Recurrent Episode, Moderate & 300.02 (F41.1)  Generalized Anxiety Disorder     Collateral Reports Completed:   Not Applicable      PLAN: (Client Tasks / Therapist Tasks / Other)  Therapist will assign homework of skill practice; provide educational materials on distraction, relaxation, and assertiveness skills; role-play conflict management; teach emotional recognition/identification.    By next appt, client will start journaling.        Lexi Han LPC                                                         ______________________________________________________________________    Treatment Plan    Client's Name: Adam Fish  YOB: 1990    Date: 6/19/2019    Diagnoses: 296.32 (F33.1) Major Depressive Disorder, Recurrent Episode, Moderate & 300.02 (F41.1) Generalized Anxiety Disorder & Alcohol Use Disorder, Mild; RULE .23 (F40.10) Social Anxiety Disorder  Psychosocial & Contextual Factors: Lack of reward at work, limited social support re: mental/emotional needs  WHODAS: 24    Referral / Collaboration:  Referral to another professional/service is not indicated at this time.    Anticipated number of session or this episode of care: 3-4 months      MeasurableTreatment Goal(s) related to diagnosis / functional impairment(s)  Goal 1: Client " "will improve mood and alcohol related issues as evidenced by decreased score on PHQ 9 from 15 (moderately severe) to 10 or less (moderate-mild).    I will know I've met my goal when I feel more comfortable and not doubt myself.      Objective #A (Client Action)    Client will decrease frequency and intensity of feeling down, depressed, hopeless as evidenced by checking perspectives, not comparing, and developing self awareness to \"figure out why I feel the way I feel and to be more comfortable in my own skin\".  Status: New - Date: 6/19/2019     Intervention(s)  Therapist will assign homework of cognitive, mood, and behavioral tracking/journaling; provide educational materials on cognitive distortions; role-play conflict management; teach the client how to perform a behavioral chain analysis.    Goal 2: Client will better manage anxiety as evidenced by decreased score on JELANI 7 from 15 (severe) to 10 or less (moderate).    I will know I've met my goal when I do not avoid people, stop over thinking, and speak up more.      Objective #A (Client Action)    Client will learn 3 anxiety management and assertiveness skills to \"get through daily interactions confidently and comfortably, with less anxiety\".  Status: New - Date: 6/19/2019     Intervention(s)  Therapist will assign homework of skill practice; provide educational materials on distraction, relaxation, and assertiveness skills; role-play conflict management; teach emotional recognition/identification.      Patient has reviewed and agreed to the above plan.      [facility]i Han [facility]  June 19, 2019  "

## 2019-07-27 ASSESSMENT — ANXIETY QUESTIONNAIRES: GAD7 TOTAL SCORE: 7

## 2019-07-30 DIAGNOSIS — M54.41 CHRONIC BILATERAL LOW BACK PAIN WITH RIGHT-SIDED SCIATICA: ICD-10-CM

## 2019-07-30 DIAGNOSIS — G89.29 CHRONIC BILATERAL LOW BACK PAIN WITH RIGHT-SIDED SCIATICA: ICD-10-CM

## 2019-07-30 NOTE — TELEPHONE ENCOUNTER
Routing refill request to provider for review/approval because:  Drug not on the [facility] refill protocol   Mayela POLLARD RN    Last Written Prescription Date:  7/1/2019  Last Fill Quantity: 90,  # refills: 0   Last office visit: 7/1/2019 with prescribing provider:     Future Office Visit:   Next 5 appointments (look out 90 days)    Aug 07, 2019  1:30 PM CDT  Return Visit with Lexi Han [facility] ([facility]  [address] [place] [address]  [phone]   Aug 21, 2019  2:30 PM CDT  Return Visit with Lexi Han [facility] ([facility]  [address] [place] [id number]  [phone]   Sep 06, 2019  9:00 AM CDT  Return Visit with Lexi Han [facility] ([facility]  [address] [place] [id number]  [phone]   Sep 20, 2019  1:30 PM CDT  Return Visit with Lexi Han [facility] ([facility]  [address] [place] [address]  [phone]         Requested Prescriptions   Pending Prescriptions Disp Refills     gabapentin (NEURONTIN) 300 MG capsule 90 capsule 0     Sig: Take 1 capsule (300 mg) by mouth 3 times daily as needed (pain)       There is no refill protocol information for this order

## 2019-07-31 RX ORDER — GABAPENTIN 300 MG/1
300 CAPSULE ORAL 3 TIMES DAILY PRN
Qty: 90 CAPSULE | Refills: 0 | Status: SHIPPED | OUTPATIENT
Start: 2019-07-31 | End: 2019-08-28

## 2019-08-07 ENCOUNTER — OFFICE VISIT (OUTPATIENT)
Dept: PSYCHOLOGY | Facility: CLINIC | Age: 29
End: 2019-08-07
Payer: COMMERCIAL

## 2019-08-07 DIAGNOSIS — F33.1 MAJOR DEPRESSIVE DISORDER, RECURRENT EPISODE, MODERATE (H): Primary | ICD-10-CM

## 2019-08-07 DIAGNOSIS — F41.1 GENERALIZED ANXIETY DISORDER: ICD-10-CM

## 2019-08-07 PROCEDURE — 90834 PSYTX W PT 45 MINUTES: CPT | Performed by: COUNSELOR

## 2019-08-07 ASSESSMENT — ANXIETY QUESTIONNAIRES
2. NOT BEING ABLE TO STOP OR CONTROL WORRYING: SEVERAL DAYS
7. FEELING AFRAID AS IF SOMETHING AWFUL MIGHT HAPPEN: NOT AT ALL
GAD7 TOTAL SCORE: 9
1. FEELING NERVOUS, ANXIOUS, OR ON EDGE: MORE THAN HALF THE DAYS
6. BECOMING EASILY ANNOYED OR IRRITABLE: SEVERAL DAYS
5. BEING SO RESTLESS THAT IT IS HARD TO SIT STILL: SEVERAL DAYS
3. WORRYING TOO MUCH ABOUT DIFFERENT THINGS: MORE THAN HALF THE DAYS
IF YOU CHECKED OFF ANY PROBLEMS ON THIS QUESTIONNAIRE, HOW DIFFICULT HAVE THESE PROBLEMS MADE IT FOR YOU TO DO YOUR WORK, TAKE CARE OF THINGS AT HOME, OR GET ALONG WITH OTHER PEOPLE: SOMEWHAT DIFFICULT

## 2019-08-07 ASSESSMENT — PATIENT HEALTH QUESTIONNAIRE - PHQ9
SUM OF ALL RESPONSES TO PHQ QUESTIONS 1-9: 11
5. POOR APPETITE OR OVEREATING: MORE THAN HALF THE DAYS

## 2019-08-07 NOTE — PROGRESS NOTES
"                                           Progress Note    Client Name: Adam Fish  Date: 8/7/2019         Service Type: Individual  Video Visit: No     Session Start Time: 1:30p  Session End Time: 2:15p     Session Length: 45 minute    Session #: 6    Attendees: Client attended alone     Treatment Plan Last Reviewed: 7/10/2019  PHQ-9 / JELANI-7: 11 & 9      DATA  Interactive Complexity: No  Crisis: No         Progress Since Last Session (Related to Symptoms / Goals / Homework):   Symptoms: No change - see Epic for PHQ 9 and JELANI 7 updates    Homework: Completed - will start journaling.      Episode of Care Goals: Satisfactory progress - ACTION (Actively working towards change); Intervened by reinforcing change plan / affirming steps taken     Current / Ongoing Stressors and Concerns:   Reported a few days of very good mood, no depression, but feeling disappointed after becoming sick and depression returning; discussed being extrinsically motivated and struggling to intrinsically motivate himself, having high, unrealistic expectations for self, comparing self to family members, minimizing his own success; acknowledged he could be doing more to feel accomplished/successful at work and at home.      Treatment Objective(s) Addressed in This Session:    Client will decrease frequency and intensity of feeling down, depressed, hopeless as evidenced by checking perspectives, not comparing, and developing self awareness to \"figure out why I feel the way I feel and to be more comfortable in my own skin\". Client will learn 3 anxiety management and assertiveness skills to \"get through daily interactions confidently and comfortably, with less anxiety\".     Intervention:  CBT: assess maladaptive patterns of behavior and cognition, teach about ABC of CBT, teach challenging negative self talk, teach communication skills for social-emotional support  Motivational Interviewing: assess and address ambivalence towards change and " "readiness and willingness to change, evoke change talk, challenge sustain talk, point out discrepancies, explore ambivalence towards change and road blocks, teach about OARS for interpersonal effectiveness  DBT: teach emotion identification, reinforce core mindfulness skills, teach TIPP skills for emotions regulation, teach scaling questions for emotion identification          ASSESSMENT: Current Emotional / Mental Status (status of significant symptoms):   Risk status (Self / Other harm or suicidal ideation)  Client denies current fears or concerns for personal safety.  Client denies current or recent suicidal ideation or behaviors. Reports passive, fleeting thoughts about wanting to escape and not have to deal with things a couple weeks ago, but denies active plans or desire to end his life.   Client denies current or recent homicidal ideation or behaviors.  Client denies current or recent self injurious behavior or ideation.  Client denies other safety concerns.  Client reports the following protective factors: positive relationships positive family connections, restricted access to lethal means, dedication to family/friends, safe and stable environment, regular physical activity, adherence with prescribed medication, living with other people, daily obligations, structured day and effective problem-solving skills.    Patient reports there has been no change in risk factors since their last session.     Patient reports there has been no change in protective factors since their last session.     Recommended that patient call 911 or go to the local ED should there be a change in any of these risk factors.     Appearance:   Appropriate    Eye Contact:   Fair    Psychomotor Behavior: Normal    Attitude:   Cooperative    Orientation:   All   Speech    Rate / Production: Normal     Volume:  Normal    Mood:    Anxious  Depressed \"A little better\"   Affect:    Appropriate  Mood congruent    Thought Content:  Clear " "   Thought Form:  Coherent  Goal Directed  Logical    Insight:    Good      Medication Review:   See Epic for updates     Medication Compliance:   Yes     Changes in Health Issues:   None reported     Chemical Use Review:   Substance Use: Problem use continues with no change since last session, Patient declined discussion at this time        Tobacco Use: No current tobacco use      Diagnosis:   296.32 (F33.1) Major Depressive Disorder, Recurrent Episode, Moderate & 300.02 (F41.1) Generalized Anxiety Disorder     Collateral Reports Completed:   Not Applicable      PLAN: (Client Tasks / Therapist Tasks / Other)  Therapist will assign homework of skill practice; provide educational materials on distraction, relaxation, and assertiveness skills; role-play conflict management; teach emotional recognition/identification.    By next appt, client will \"not vegging out at home\" and practice 2 days a week of realistic productivity at home.         Lexi Han [facility]                                                         ______________________________________________________________________    Treatment Plan    Client's Name: Adam Fish  YOB: 1990    Date: 6/19/2019    Diagnoses: 296.32 (F33.1) Major Depressive Disorder, Recurrent Episode, Moderate & 300.02 (F41.1) Generalized Anxiety Disorder & Alcohol Use Disorder, Mild; RULE .23 (F40.10) Social Anxiety Disorder  Psychosocial & Contextual Factors: Lack of reward at work, limited social support re: mental/emotional needs  WHODAS: 24    Referral / Collaboration:  Referral to another professional/service is not indicated at this time.    Anticipated number of session or this episode of care: 3-4 months      MeasurableTreatment Goal(s) related to diagnosis / functional impairment(s)  Goal 1: Client will improve mood and alcohol related issues as evidenced by decreased score on PHQ 9 from 15 (moderately severe) to 10 or less (moderate-mild).    I will " "know I've met my goal when I feel more comfortable and not doubt myself.      Objective #A (Client Action)    Client will decrease frequency and intensity of feeling down, depressed, hopeless as evidenced by checking perspectives, not comparing, and developing self awareness to \"figure out why I feel the way I feel and to be more comfortable in my own skin\".  Status: New - Date: 6/19/2019     Intervention(s)  Therapist will assign homework of cognitive, mood, and behavioral tracking/journaling; provide educational materials on cognitive distortions; role-play conflict management; teach the client how to perform a behavioral chain analysis.    Goal 2: Client will better manage anxiety as evidenced by decreased score on JELANI 7 from 15 (severe) to 10 or less (moderate).    I will know I've met my goal when I do not avoid people, stop over thinking, and speak up more.      Objective #A (Client Action)    Client will learn 3 anxiety management and assertiveness skills to \"get through daily interactions confidently and comfortably, with less anxiety\".  Status: New - Date: 6/19/2019     Intervention(s)  Therapist will assign homework of skill practice; provide educational materials on distraction, relaxation, and assertiveness skills; role-play conflict management; teach emotional recognition/identification.      Patient has reviewed and agreed to the above plan.      IOANA Pearson  June 19, 2019  "

## 2019-08-08 ASSESSMENT — ANXIETY QUESTIONNAIRES: GAD7 TOTAL SCORE: 9

## 2019-08-27 DIAGNOSIS — M54.41 CHRONIC BILATERAL LOW BACK PAIN WITH RIGHT-SIDED SCIATICA: ICD-10-CM

## 2019-08-27 DIAGNOSIS — G89.29 CHRONIC BILATERAL LOW BACK PAIN WITH RIGHT-SIDED SCIATICA: ICD-10-CM

## 2019-08-27 NOTE — TELEPHONE ENCOUNTER
GABAPENTIN 300 MG CAPSULE  Last Written Prescription Date:  07/31/19  Last Fill Quantity: 90,  # refills: 0   Last office visit: 7/1/2019 with prescribing provider: Dr. Cope   Future Office Visit: 09/20/19 with psychology  Next 5 appointments (look out 90 days)    Sep 06, 2019  9:00 AM CDT  Return Visit with Lexi Han [facility] ([address] [address]  [phone]   Sep 20, 2019  1:30 PM CDT  Return Visit with Lexi Han [facility] ([place] [id number] [place] [id number]  [facility] [address]  [phone]         Requested Prescriptions   Pending Prescriptions Disp Refills     gabapentin (NEURONTIN) 300 MG capsule 90 capsule 0     Sig: Take 1 capsule (300 mg) by mouth 3 times daily as needed (pain)       There is no refill protocol information for this order

## 2019-08-28 RX ORDER — GABAPENTIN 300 MG/1
300 CAPSULE ORAL 3 TIMES DAILY PRN
Qty: 90 CAPSULE | Refills: 0 | Status: SHIPPED | OUTPATIENT
Start: 2019-08-28 | End: 2019-09-26

## 2019-08-28 NOTE — TELEPHONE ENCOUNTER
FS    Please address due to JF's absence.  Looks like patient has seen you past 3 OV.  Last OV with JF 7/2/18 for preop  Routing refill request to provider for review/approval because:  Drug not on the [facility] refill protocol   Thanks,  Candi Gil, RN

## 2019-09-06 ENCOUNTER — OFFICE VISIT (OUTPATIENT)
Dept: PSYCHOLOGY | Facility: CLINIC | Age: 29
End: 2019-09-06
Payer: COMMERCIAL

## 2019-09-06 DIAGNOSIS — F41.1 GENERALIZED ANXIETY DISORDER: ICD-10-CM

## 2019-09-06 DIAGNOSIS — F33.1 MAJOR DEPRESSIVE DISORDER, RECURRENT EPISODE, MODERATE (H): Primary | ICD-10-CM

## 2019-09-06 PROCEDURE — 90834 PSYTX W PT 45 MINUTES: CPT | Performed by: COUNSELOR

## 2019-09-06 ASSESSMENT — ANXIETY QUESTIONNAIRES
1. FEELING NERVOUS, ANXIOUS, OR ON EDGE: MORE THAN HALF THE DAYS
IF YOU CHECKED OFF ANY PROBLEMS ON THIS QUESTIONNAIRE, HOW DIFFICULT HAVE THESE PROBLEMS MADE IT FOR YOU TO DO YOUR WORK, TAKE CARE OF THINGS AT HOME, OR GET ALONG WITH OTHER PEOPLE: SOMEWHAT DIFFICULT
GAD7 TOTAL SCORE: 9
6. BECOMING EASILY ANNOYED OR IRRITABLE: SEVERAL DAYS
2. NOT BEING ABLE TO STOP OR CONTROL WORRYING: SEVERAL DAYS
5. BEING SO RESTLESS THAT IT IS HARD TO SIT STILL: SEVERAL DAYS
7. FEELING AFRAID AS IF SOMETHING AWFUL MIGHT HAPPEN: SEVERAL DAYS
3. WORRYING TOO MUCH ABOUT DIFFERENT THINGS: SEVERAL DAYS

## 2019-09-06 ASSESSMENT — PATIENT HEALTH QUESTIONNAIRE - PHQ9
5. POOR APPETITE OR OVEREATING: MORE THAN HALF THE DAYS
SUM OF ALL RESPONSES TO PHQ QUESTIONS 1-9: 11

## 2019-09-07 ASSESSMENT — ANXIETY QUESTIONNAIRES: GAD7 TOTAL SCORE: 9

## 2019-09-20 ENCOUNTER — OFFICE VISIT (OUTPATIENT)
Dept: PSYCHOLOGY | Facility: CLINIC | Age: 29
End: 2019-09-20
Payer: COMMERCIAL

## 2019-09-20 DIAGNOSIS — F33.1 MAJOR DEPRESSIVE DISORDER, RECURRENT EPISODE, MODERATE (H): Primary | ICD-10-CM

## 2019-09-20 DIAGNOSIS — F41.1 GENERALIZED ANXIETY DISORDER: ICD-10-CM

## 2019-09-20 PROCEDURE — 90834 PSYTX W PT 45 MINUTES: CPT | Performed by: COUNSELOR

## 2019-09-20 ASSESSMENT — ANXIETY QUESTIONNAIRES
1. FEELING NERVOUS, ANXIOUS, OR ON EDGE: MORE THAN HALF THE DAYS
6. BECOMING EASILY ANNOYED OR IRRITABLE: SEVERAL DAYS
IF YOU CHECKED OFF ANY PROBLEMS ON THIS QUESTIONNAIRE, HOW DIFFICULT HAVE THESE PROBLEMS MADE IT FOR YOU TO DO YOUR WORK, TAKE CARE OF THINGS AT HOME, OR GET ALONG WITH OTHER PEOPLE: SOMEWHAT DIFFICULT
GAD7 TOTAL SCORE: 7
2. NOT BEING ABLE TO STOP OR CONTROL WORRYING: SEVERAL DAYS
7. FEELING AFRAID AS IF SOMETHING AWFUL MIGHT HAPPEN: NOT AT ALL
5. BEING SO RESTLESS THAT IT IS HARD TO SIT STILL: NOT AT ALL
3. WORRYING TOO MUCH ABOUT DIFFERENT THINGS: SEVERAL DAYS

## 2019-09-20 ASSESSMENT — PATIENT HEALTH QUESTIONNAIRE - PHQ9
5. POOR APPETITE OR OVEREATING: MORE THAN HALF THE DAYS
SUM OF ALL RESPONSES TO PHQ QUESTIONS 1-9: 10

## 2019-09-20 NOTE — PROGRESS NOTES
"                                           Progress Note    Client Name: Adam Fish  Date: 9/20/2019         Service Type: Individual  Video Visit: No     Session Start Time: 1:30p  Session End Time: 2:15p     Session Length: 45 minute    Session #: 8    Attendees: Client attended alone     Treatment Plan Last Reviewed: 7/10/2019  PHQ-9 / JELANI-7: 10 & 7      DATA  Interactive Complexity: No  Crisis: No         Progress Since Last Session (Related to Symptoms / Goals / Homework):   Symptoms: No change- see Epic for PHQ 9 and JELANI 7 updates    Homework: Completed -  practice one mindfully and allow himself to \"just experience\".      Episode of Care Goals: Satisfactory progress - ACTION (Actively working towards change); Intervened by reinforcing change plan / affirming steps taken     Current / Ongoing Stressors and Concerns:   Reported some ongoing improvements, but feeling like his mood is the same - can engage in skills, but still self conscious, anxious; acknowledged feeling skeptical about the therapy progress and wondered if there is more that can be done to address deep seeded psychological distress - provider recommended EMDR and medication evaluation; also reported concerns about how to support gf when he is struggling with his own issues and finds himself feeling irritable towards her as a result.      Treatment Objective(s) Addressed in This Session:    Client will decrease frequency and intensity of feeling down, depressed, hopeless as evidenced by checking perspectives, not comparing, and developing self awareness to \"figure out why I feel the way I feel and to be more comfortable in my own skin\". Client will learn 3 anxiety management and assertiveness skills to \"get through daily interactions confidently and comfortably, with less anxiety\".     Intervention:  CBT: assess maladaptive patterns of behavior and cognition, teach about ABC of CBT, teach challenging negative self talk, teach communication " skills for social-emotional support, teach about empathy  Motivational Interviewing: assess and address ambivalence towards change and readiness and willingness to change, evoke change talk, challenge sustain talk, point out discrepancies, explore ambivalence towards change and road blocks, teach about OARS for interpersonal effectiveness  DBT: teach emotion identification, reinforce core mindfulness skills, teach TIPP skills for emotions regulation, teach scaling questions for emotion identification, teach core mindfulness skills          ASSESSMENT: Current Emotional / Mental Status (status of significant symptoms):   Risk status (Self / Other harm or suicidal ideation)  Client denies current fears or concerns for personal safety.  Client denies current or recent suicidal ideation or behaviors. Reports passive, fleeting thoughts about wanting to escape and not have to deal with things a couple weeks ago, but denies active plans or desire to end his life.   Client denies current or recent homicidal ideation or behaviors.  Client denies current or recent self injurious behavior or ideation.  Client denies other safety concerns.  Client reports the following protective factors: positive relationships positive family connections, restricted access to lethal means, dedication to family/friends, safe and stable environment, regular physical activity, adherence with prescribed medication, living with other people, daily obligations, structured day and effective problem-solving skills.    Patient reports there has been no change in risk factors since their last session.     Patient reports there has been no change in protective factors since their last session.     Recommended that patient call 911 or go to the local ED should there be a change in any of these risk factors.     Appearance:   Appropriate    Eye Contact:   Fair    Psychomotor Behavior: Normal    Attitude:   Cooperative    Orientation:   All   Speech    Rate  / Production: Normal     Volume:  Normal    Mood:    Anxious  Depressed    Affect:    Appropriate  Mood congruent    Thought Content:  Clear    Thought Form:  Coherent  Goal Directed  Logical    Insight:    Good      Medication Review:   See Epic for updates     Medication Compliance:   Yes     Changes in Health Issues:   None reported     Chemical Use Review:   Substance Use: Problem use continues with no change since last session, Patient declined discussion at this time        Tobacco Use: No current tobacco use      Diagnosis:   296.32 (F33.1) Major Depressive Disorder, Recurrent Episode, Moderate & 300.02 (F41.1) Generalized Anxiety Disorder     Collateral Reports Completed:   Not Applicable      PLAN: (Client Tasks / Therapist Tasks / Other)  Therapist will assign homework of skill practice; provide educational materials on distraction, relaxation, and assertiveness skills; role-play conflict management; teach emotional recognition/identification.    By next appt, client will continue to challenge negative self judgments and continue to engage in interpersonal effectiveness.        Lexi Han [facility]                                                         ______________________________________________________________________    Treatment Plan    Client's Name: Adam Fish  YOB: 1990    Date: 6/19/2019    Diagnoses: 296.32 (F33.1) Major Depressive Disorder, Recurrent Episode, Moderate & 300.02 (F41.1) Generalized Anxiety Disorder & Alcohol Use Disorder, Mild; RULE .23 (F40.10) Social Anxiety Disorder  Psychosocial & Contextual Factors: Lack of reward at work, limited social support re: mental/emotional needs  WHODAS: 24    Referral / Collaboration:  Referral to another professional/service is not indicated at this time.    Anticipated number of session or this episode of care: 3-4 months      MeasurableTreatment Goal(s) related to diagnosis / functional impairment(s)  Goal 1: Client  "will improve mood and alcohol related issues as evidenced by decreased score on PHQ 9 from 15 (moderately severe) to 10 or less (moderate-mild).    I will know I've met my goal when I feel more comfortable and not doubt myself.      Objective #A (Client Action)    Client will decrease frequency and intensity of feeling down, depressed, hopeless as evidenced by checking perspectives, not comparing, and developing self awareness to \"figure out why I feel the way I feel and to be more comfortable in my own skin\".  Status: New - Date: 6/19/2019     Intervention(s)  Therapist will assign homework of cognitive, mood, and behavioral tracking/journaling; provide educational materials on cognitive distortions; role-play conflict management; teach the client how to perform a behavioral chain analysis.    Goal 2: Client will better manage anxiety as evidenced by decreased score on JELANI 7 from 15 (severe) to 10 or less (moderate).    I will know I've met my goal when I do not avoid people, stop over thinking, and speak up more.      Objective #A (Client Action)    Client will learn 3 anxiety management and assertiveness skills to \"get through daily interactions confidently and comfortably, with less anxiety\".  Status: New - Date: 6/19/2019     Intervention(s)  Therapist will assign homework of skill practice; provide educational materials on distraction, relaxation, and assertiveness skills; role-play conflict management; teach emotional recognition/identification.      Patient has reviewed and agreed to the above plan.      Lexi Han [facility]  June 19, 2019  "

## 2019-09-21 ASSESSMENT — ANXIETY QUESTIONNAIRES: GAD7 TOTAL SCORE: 7

## 2019-09-24 ENCOUNTER — TELEPHONE (OUTPATIENT)
Dept: FAMILY MEDICINE | Facility: CLINIC | Age: 29
End: 2019-09-24

## 2019-09-24 DIAGNOSIS — F43.23 ADJUSTMENT DISORDER WITH MIXED ANXIETY AND DEPRESSED MOOD: ICD-10-CM

## 2019-09-24 NOTE — TELEPHONE ENCOUNTER
"BUPROPION  Last Written Prescription Date:  07/01/19  Last Fill Quantity: 90,  # refills: 0   Last office visit: 7/1/2019 with prescribing provider: YES   Future Office Visit:   Next 5 appointments (look out 90 days)    Sep 26, 2019  9:00 AM CDT  Marcelo Cole with Lulu Cope MD  [facility] ([facility]) [address] [place]  [facility] [address]  [phone]   Oct 18, 2019 11:00 AM CDT  Return Visit with Lexi Han [facility] ([facility]) [facility]  [address]  [facility] [address]  [phone]   Oct 31, 2019 10:00 AM CDT  Return Visit with Lexi Han [facility] ([facility]) [facility]  [address]  [facility] [address]  [phone]   Nov 14, 2019 10:00 AM CST  Return Visit with Lexi Han [facility] ([facility]) [facility]  [address]  [facility] [address]  [phone]         Requested Prescriptions   Pending Prescriptions Disp Refills     buPROPion (WELLBUTRIN XL) 150 MG 24 hr tablet 90 tablet 0     Sig: Take 1 tablet (150 mg) by mouth every morning       SSRIs Protocol Failed - 9/24/2019 11:53 AM        Failed - PHQ-9 score less than 5 in past 6 months     Please review last PHQ-9 score.           Passed - Medication is Bupropion     If the medication is Bupropion (Wellbutrin), and the patient is taking for smoking cessation; OK to refill.          Passed - Medication is active on med list        Passed - Patient is age 18 or older        Passed - Recent (6 mo) or future (30 days) visit within the authorizing provider's specialty     Patient had office visit in the last 6 months or has a visit in the next 30 days with authorizing provider or within the authorizing provider's specialty.  See \"Patient Info\" tab in inbasket, or \"Choose Columns\" in Meds & Orders section " of the refill encounter.

## 2019-09-25 ENCOUNTER — MYC REFILL (OUTPATIENT)
Dept: FAMILY MEDICINE | Facility: CLINIC | Age: 29
End: 2019-09-25

## 2019-09-25 DIAGNOSIS — F41.8 DEPRESSION WITH ANXIETY: ICD-10-CM

## 2019-09-25 DIAGNOSIS — F43.23 ADJUSTMENT DISORDER WITH MIXED ANXIETY AND DEPRESSED MOOD: ICD-10-CM

## 2019-09-25 RX ORDER — HYDROXYZINE PAMOATE 25 MG/1
25 CAPSULE ORAL 3 TIMES DAILY PRN
Qty: 60 CAPSULE | Refills: 0 | Status: SHIPPED | OUTPATIENT
Start: 2019-09-25 | End: 2019-11-27

## 2019-09-25 RX ORDER — BUPROPION HYDROCHLORIDE 150 MG/1
150 TABLET ORAL EVERY MORNING
Qty: 30 TABLET | Refills: 0 | Status: SHIPPED | OUTPATIENT
Start: 2019-09-25 | End: 2019-09-26

## 2019-09-25 RX ORDER — TRAZODONE HYDROCHLORIDE 50 MG/1
TABLET, FILM COATED ORAL
Qty: 270 TABLET | Refills: 0 | Status: SHIPPED | OUTPATIENT
Start: 2019-09-25 | End: 2019-11-27

## 2019-09-25 NOTE — TELEPHONE ENCOUNTER
"Prescription approved per [facility] Refill Protocol.  Mayela POLLARD RN    Last Written Prescription Date:  7/1/2019  Last Fill Quantity: 60,  # refills: 0   Last office visit: 7/1/2019 with prescribing provider:     Future Office Visit:   Next 5 appointments (look out 90 days)    Sep 26, 2019  9:00 AM CDT  MyCcrt [place] with Lulu Cope MD  [facility] ([facility] [address] [facility] [address]  [phone]   Oct 18, 2019 11:00 AM CDT  Return Visit with Lexi Han [facility] ([facility]) [facility]  [address] [place] [address]  [phone]   Oct 31, 2019 10:00 AM CDT  Return Visit with Lexi Han [facility] ([facility]) [place] [address] [place] [address]  [phone]   Nov 14, 2019 10:00 AM CST  Return Visit with Lexi Han [facility] ([facility]) [facility]  [address] [place] [address]  [phone]         Requested Prescriptions   Pending Prescriptions Disp Refills     hydrOXYzine (VISTARIL) 25 MG capsule 60 capsule 0     Sig: Take 1 capsule (25 mg) by mouth 3 times daily as needed for anxiety       Antihistamines Protocol Passed - 9/25/2019  9:19 AM        Passed - Recent (12 mo) or future (30 days) visit within the authorizing provider's specialty     Patient had office visit in the last 12 months or has a visit in the next 30 days with authorizing provider or within the authorizing provider's specialty.  See \"Patient Info\" tab in inbasket, or \"Choose Columns\" in Meds & Orders section of the refill encounter.              Passed - Patient is age 3 or older     Apply age and/or weight-based dosing for peds patients age 3 and older.    Forward request to provider for patients under the age of 3.          Passed - Medication is active on med list "

## 2019-09-25 NOTE — TELEPHONE ENCOUNTER
Medication is being filled for 1 time refill only due to:  Patient needs to be seen because due for follow up in October.

## 2019-09-25 NOTE — TELEPHONE ENCOUNTER
"Prescription approved per [facility] Refill Protocol.  Mayela POLLARD RN    Last Written Prescription Date:  2018  Last Fill Quantity: 270,  # refills: 0   Last office visit: 2019 with prescribing provider:     Future Office Visit:   Next 5 appointments (look out 90 days)    Sep 26, 2019  9:00 AM CDT  MyCcrt [place] with Lulu Cope MD  [facility] ([facility] [address]  [facility] [address]  [phone]   Oct 18, 2019 11:00 AM CDT  Return Visit with Lexi Han [facility] ([facility]  [address] [place] [address]  [phone]   Oct 31, 2019 10:00 AM CDT  Return Visit with Lexi Han [facility] ([facility]) [facility]  [address] [place] [address]  [phone]   2019 10:00 AM CST  Return Visit with Lexi Han [facility] ([facility]) [facility]  [address] [place] [address]  [phone]         Requested Prescriptions   Pending Prescriptions Disp Refills     traZODone (DESYREL) 50 MG tablet 270 tablet 0     Si-3 at hs prn sleep       Serotonin Modulators Passed - 2019  9:19 AM        Passed - Recent (12 mo) or future (30 days) visit within the authorizing provider's specialty     Patient had office visit in the last 12 months or has a visit in the next 30 days with authorizing provider or within the authorizing provider's specialty.  See \"Patient Info\" tab in inbasket, or \"Choose Columns\" in Meds & Orders section of the refill encounter.              Passed - Medication is active on med list        Passed - Patient is age 18 or older        "

## 2019-09-26 ENCOUNTER — OFFICE VISIT (OUTPATIENT)
Dept: FAMILY MEDICINE | Facility: CLINIC | Age: 29
End: 2019-09-26
Payer: COMMERCIAL

## 2019-09-26 VITALS
DIASTOLIC BLOOD PRESSURE: 88 MMHG | OXYGEN SATURATION: 98 % | TEMPERATURE: 98.4 F | BODY MASS INDEX: 28.31 KG/M2 | RESPIRATION RATE: 16 BRPM | WEIGHT: 209 LBS | HEART RATE: 58 BPM | SYSTOLIC BLOOD PRESSURE: 127 MMHG | HEIGHT: 72 IN

## 2019-09-26 DIAGNOSIS — F41.0 ANXIETY ATTACK: Primary | ICD-10-CM

## 2019-09-26 DIAGNOSIS — F43.23 ADJUSTMENT DISORDER WITH MIXED ANXIETY AND DEPRESSED MOOD: ICD-10-CM

## 2019-09-26 DIAGNOSIS — G89.29 CHRONIC BILATERAL LOW BACK PAIN WITH RIGHT-SIDED SCIATICA: ICD-10-CM

## 2019-09-26 DIAGNOSIS — M54.41 CHRONIC BILATERAL LOW BACK PAIN WITH RIGHT-SIDED SCIATICA: ICD-10-CM

## 2019-09-26 PROCEDURE — 96127 BRIEF EMOTIONAL/BEHAV ASSMT: CPT | Performed by: FAMILY MEDICINE

## 2019-09-26 PROCEDURE — 99214 OFFICE O/P EST MOD 30 MIN: CPT | Performed by: FAMILY MEDICINE

## 2019-09-26 RX ORDER — BUPROPION HYDROCHLORIDE 150 MG/1
150 TABLET ORAL EVERY MORNING
Qty: 60 TABLET | Refills: 0 | Status: SHIPPED | OUTPATIENT
Start: 2019-09-26 | End: 2019-10-24 | Stop reason: DRUGHIGH

## 2019-09-26 RX ORDER — LORAZEPAM 0.5 MG/1
0.5 TABLET ORAL DAILY PRN
Qty: 12 TABLET | Refills: 0 | Status: SHIPPED | OUTPATIENT
Start: 2019-09-26 | End: 2019-10-23

## 2019-09-26 RX ORDER — FLUOXETINE 40 MG/1
40 CAPSULE ORAL DAILY
Qty: 60 CAPSULE | Refills: 0 | Status: SHIPPED | OUTPATIENT
Start: 2019-09-26 | End: 2019-11-27

## 2019-09-26 RX ORDER — GABAPENTIN 300 MG/1
300 CAPSULE ORAL 3 TIMES DAILY PRN
Qty: 90 CAPSULE | Refills: 0 | Status: SHIPPED | OUTPATIENT
Start: 2019-09-26 | End: 2019-10-22

## 2019-09-26 ASSESSMENT — ANXIETY QUESTIONNAIRES
7. FEELING AFRAID AS IF SOMETHING AWFUL MIGHT HAPPEN: NOT AT ALL
5. BEING SO RESTLESS THAT IT IS HARD TO SIT STILL: SEVERAL DAYS
3. WORRYING TOO MUCH ABOUT DIFFERENT THINGS: SEVERAL DAYS
2. NOT BEING ABLE TO STOP OR CONTROL WORRYING: MORE THAN HALF THE DAYS
IF YOU CHECKED OFF ANY PROBLEMS ON THIS QUESTIONNAIRE, HOW DIFFICULT HAVE THESE PROBLEMS MADE IT FOR YOU TO DO YOUR WORK, TAKE CARE OF THINGS AT HOME, OR GET ALONG WITH OTHER PEOPLE: SOMEWHAT DIFFICULT
1. FEELING NERVOUS, ANXIOUS, OR ON EDGE: NEARLY EVERY DAY
GAD7 TOTAL SCORE: 10
6. BECOMING EASILY ANNOYED OR IRRITABLE: SEVERAL DAYS

## 2019-09-26 ASSESSMENT — MIFFLIN-ST. JEOR: SCORE: 1946.02

## 2019-09-26 NOTE — NURSING NOTE
Chief Complaint   Patient presents with     Depression     Anxiety     /88   Pulse 58   Temp 98.4  F (36.9  C) (Oral)   Resp 16   Ht 1.829 m (6')   Wt 94.8 kg (209 lb)   SpO2 98%   BMI 28.35 kg/m   Estimated body mass index is 28.35 kg/m  as calculated from the following:    Height as of this encounter: 1.829 m (6').    Weight as of this encounter: 94.8 kg (209 lb).  bp completed using cuff size: regular       Health Maintenance addressed:  NONE    n/a    Princess Nunez, [facility], [place]

## 2019-09-26 NOTE — PROGRESS NOTES
Sunni Fish is a 29 year old male who presents to clinic today for the following health issues:    HPI   Depression and Anxiety Follow-Up    How are you doing with your depression since your last visit? Slight improved     How are you doing with your anxiety since your last visit?  Slight improved    Are you having other symptoms that might be associated with depression or anxiety? No    Have you had a significant life event? No     Do you have any concerns with your use of alcohol or other drugs? No    Social History     Tobacco Use     Smoking status: Never Smoker     Smokeless tobacco: Never Used   Substance Use Topics     Alcohol use: Yes     Alcohol/week: 0.0 standard drinks     Comment: occ.     Drug use: No     PHQ 9/6/2019 9/20/2019 9/26/2019   PHQ-9 Total Score 11 10 10   Q9: Thoughts of better off dead/self-harm past 2 weeks Not at all Not at all Not at all     JELANI-7 SCORE 9/6/2019 9/20/2019 9/26/2019   Total Score - - -   Total Score 9 7 10     Suicide Assessment Five-step Evaluation and Treatment (SAFE-T)      How many servings of fruits and vegetables do you eat daily?  2-3    On average, how many sweetened beverages do you drink each day (soda, juice, sweet tea, etc)?   1    How many days per week do you miss taking your medication? 0    No significant improvement     Reviewed and updated as needed this visit by Provider         Review of Systems   ROS COMP: Constitutional, HEENT, cardiovascular, pulmonary, gi and gu systems are negative, except as otherwise noted.      Objective    /88   Pulse 58   Temp 98.4  F (36.9  C) (Oral)   Resp 16   Ht 1.829 m (6')   Wt 94.8 kg (209 lb)   SpO2 98%   BMI 28.35 kg/m    Body mass index is 28.35 kg/m .  Physical Exam   GENERAL: healthy, alert and no distress  NECK: no adenopathy, no asymmetry, masses, or scars and thyroid normal to palpation  RESP: lungs clear to auscultation - no rales, rhonchi or wheezes  CV: regular rate and  rhythm, normal S1 S2, no S3 or S4, no murmur, click or rub, no peripheral edema and peripheral pulses strong  PSYCH: mentation appears normal, affect normal/bright    Diagnostic Test Results:  Labs reviewed in Epic        Assessment & Plan       ICD-10-CM    1. Anxiety attack F41.0 LORazepam (ATIVAN) 0.5 MG tablet   2. Adjustment disorder with mixed anxiety and depressed mood F43.23 buPROPion (WELLBUTRIN XL) 150 MG 24 hr tablet     EMOTIONAL / BEHAVIORAL ASSESSMENT     FLUoxetine (PROZAC) 40 MG capsule     MENTAL HEALTH REFERRAL  - Adult; Psychiatry and Medication Management; Psychiatry; [facility]: [facility] [phone].  Medication management & future refills will be returned to G PCP upon completion of evaluation; We kiko...   3. Chronic bilateral low back pain with right-sided sciatica M54.41 gabapentin (NEURONTIN) 300 MG capsule    G89.29      No significant improvement.   Referred to psychiatry.     Return in about 6 months (around 3/26/2020) for Follow-up visit.    Lulu Cope MD  [facility]

## 2019-09-27 ENCOUNTER — MYC MEDICAL ADVICE (OUTPATIENT)
Dept: FAMILY MEDICINE | Facility: CLINIC | Age: 29
End: 2019-09-27

## 2019-09-27 ASSESSMENT — ANXIETY QUESTIONNAIRES: GAD7 TOTAL SCORE: 10

## 2019-09-30 ENCOUNTER — HEALTH MAINTENANCE LETTER (OUTPATIENT)
Age: 29
End: 2019-09-30

## 2019-10-01 NOTE — TELEPHONE ENCOUNTER
After review of forms  Patient was reached through  My chart please read. [facility]aced forms [facility] tow .  Lanie Dumont CMA on 10/1/2019 at 12:17 PM

## 2019-10-01 NOTE — TELEPHONE ENCOUNTER
Forms received from [facility] for completion and signature  Placed forms:  On your desl  Forms need to be faxed to [phone]    Patient call? yes  Copy sent to scanning? yes    All.LATA Dumont

## 2019-10-09 ENCOUNTER — VIRTUAL VISIT (OUTPATIENT)
Dept: FAMILY MEDICINE | Facility: CLINIC | Age: 29
End: 2019-10-09
Payer: COMMERCIAL

## 2019-10-09 DIAGNOSIS — M54.41 CHRONIC MIDLINE LOW BACK PAIN WITH RIGHT-SIDED SCIATICA: Primary | ICD-10-CM

## 2019-10-09 DIAGNOSIS — G89.29 CHRONIC MIDLINE LOW BACK PAIN WITH RIGHT-SIDED SCIATICA: Primary | ICD-10-CM

## 2019-10-09 PROCEDURE — 99442 ZZC PHYSICIAN TELEPHONE EVALUATION 11-20 MIN: CPT | Performed by: FAMILY MEDICINE

## 2019-10-09 NOTE — PROGRESS NOTES
"Adam Fish is a 29 year old male who is being evaluated via a telephone visit.      The patient has been notified of following (by M.A)      S: The history as provided by the patient to the provider during this 3 way call include     Pertinent parts of the the patient's medical history reviewed and confirmed by the provider included :      Total time of call between patient and provider was 15 minutes     30 yo male , known history of right sciatica , disc herniation L4L5- MRI in jan 2019.Did PHYSICAL THERAPY  As well for ongoing lower back pain radiating to the right side.Needs forms to get  standing desk at work.  Stand up desk- help  Lower back pain - better on gabapentin 900 once daily  tightness in lower right back, down the right buttock worse if sitting for longer hours on the work desk.Back extension exercise  Playing, or sitting at one place for a long time- it makes it worse    Dates started pain is NOv 2019. No acute or choric injury   Denies saddle numbness, loss of bowel or bladder control      I have reviewed the note as documented above.  This accurately captures the substance of my conversation with the patient,    Additional provider notes:    Assessment/Plan:  1. 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  He is advised to continue with back excercise and if any worsening of back pain,bowel or bladder dysfunction, saddle  numbness, to see urgent care  No medications changes        \"We have found that certain health care needs can be provided without the need for a physical exam.  This service lets us provide the care you need with a short phone conversation.  If a prescription is necessary we can send it directly to your pharmacy.  If lab work is needed we can place an order for that and you can then stop by our lab to have the test done at a later time.    This telephone visit will be conducted via 3 " "way call with the you (the patient) , the physician/provider, and a me all on the line at the same time.  This allows your physician/provider to have the phone conversation with you while I will be taking notes for your medical record.  We will have full access to your [place] medical record during this entire phone call.    Since this is like an office visit,  will bill your insurance company for this service.  Please check with your medical insurance if this type of telephone/virtual is covered . You may be responsible for the cost of this service if insurance coverage is denied.  The typical cost is $30 (10min), $59(11-20min) and $85 (21-30min)     If during the course of the call the physician/provider feels a telephone visit is not appropriate, you will not be charged for this service\"    Consent has been obtained for this service by care team member: yes.  See the scanned image in the medical record.                "

## 2019-10-09 NOTE — TELEPHONE ENCOUNTER
TCs  Pt is scheduled for a phone visit. Do you have to fill out some forms? Pt has been sent information regarding phone visits, billing, etc. through [employer].    Thank you,  Tracy Arambula RN

## 2019-10-09 NOTE — PATIENT INSTRUCTIONS
Assessment/Plan:  1. 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  He is advised to continue with back excercise and if any worsening of back pain,bowel or bladder dysfunction, saddle  numbness, to see urgent care  No medications changes

## 2019-10-22 ENCOUNTER — MYC REFILL (OUTPATIENT)
Dept: FAMILY MEDICINE | Facility: CLINIC | Age: 29
End: 2019-10-22

## 2019-10-22 DIAGNOSIS — G89.29 CHRONIC BILATERAL LOW BACK PAIN WITH RIGHT-SIDED SCIATICA: ICD-10-CM

## 2019-10-22 DIAGNOSIS — F43.23 ADJUSTMENT DISORDER WITH MIXED ANXIETY AND DEPRESSED MOOD: ICD-10-CM

## 2019-10-22 DIAGNOSIS — F41.0 ANXIETY ATTACK: ICD-10-CM

## 2019-10-22 DIAGNOSIS — M54.41 CHRONIC BILATERAL LOW BACK PAIN WITH RIGHT-SIDED SCIATICA: ICD-10-CM

## 2019-10-22 RX ORDER — BUPROPION HYDROCHLORIDE 150 MG/1
150 TABLET ORAL EVERY MORNING
Qty: 60 TABLET | Refills: 0 | Status: CANCELLED | OUTPATIENT
Start: 2019-10-22

## 2019-10-22 RX ORDER — LORAZEPAM 0.5 MG/1
0.5 TABLET ORAL DAILY PRN
Qty: 12 TABLET | Refills: 0 | Status: CANCELLED | OUTPATIENT
Start: 2019-10-22

## 2019-10-23 NOTE — TELEPHONE ENCOUNTER
FS  Gabapentin:  Routing refill request to provider for review/approval because:  Drug not on the [facility] refill protocol       Lorazepam 0.5 mg:  Last Rx and OV 9/26/19  : refilled 9/26 - #12  Anxiety attack not on problem list    Do you want to see patient for follow up?  Candi Gil, [facility]      Note  Wellbutrin:  Rx sent 9/26 for #60 - 2 month supply. Patient takes one daily

## 2019-10-24 ENCOUNTER — MEDICAL CORRESPONDENCE (OUTPATIENT)
Dept: HEALTH INFORMATION MANAGEMENT | Facility: CLINIC | Age: 29
End: 2019-10-24

## 2019-10-24 ENCOUNTER — OFFICE VISIT (OUTPATIENT)
Dept: BEHAVIORAL HEALTH | Facility: CLINIC | Age: 29
End: 2019-10-24
Attending: FAMILY MEDICINE
Payer: COMMERCIAL

## 2019-10-24 ENCOUNTER — TRANSFERRED RECORDS (OUTPATIENT)
Dept: HEALTH INFORMATION MANAGEMENT | Facility: CLINIC | Age: 29
End: 2019-10-24

## 2019-10-24 VITALS
OXYGEN SATURATION: 98 % | BODY MASS INDEX: 28.3 KG/M2 | HEART RATE: 56 BPM | DIASTOLIC BLOOD PRESSURE: 83 MMHG | TEMPERATURE: 97.8 F | WEIGHT: 208.7 LBS | SYSTOLIC BLOOD PRESSURE: 138 MMHG

## 2019-10-24 DIAGNOSIS — F41.1 GAD (GENERALIZED ANXIETY DISORDER): ICD-10-CM

## 2019-10-24 DIAGNOSIS — F34.1 DYSTHYMIA: Primary | ICD-10-CM

## 2019-10-24 DIAGNOSIS — F10.10 ALCOHOL ABUSE, EPISODIC DRINKING BEHAVIOR: ICD-10-CM

## 2019-10-24 DIAGNOSIS — F41.8 DEPRESSION WITH ANXIETY: ICD-10-CM

## 2019-10-24 PROCEDURE — 90792 PSYCH DIAG EVAL W/MED SRVCS: CPT | Performed by: PSYCHIATRY & NEUROLOGY

## 2019-10-24 RX ORDER — BUPROPION HYDROCHLORIDE 300 MG/1
300 TABLET ORAL EVERY MORNING
Qty: 30 TABLET | Refills: 1 | Status: SHIPPED | OUTPATIENT
Start: 2019-10-24 | End: 2020-01-02

## 2019-10-24 SDOH — HEALTH STABILITY: MENTAL HEALTH: HOW OFTEN DO YOU HAVE A DRINK CONTAINING ALCOHOL?: 2-3 TIMES A WEEK

## 2019-10-24 SDOH — HEALTH STABILITY: MENTAL HEALTH: HOW OFTEN DO YOU HAVE 6 OR MORE DRINKS ON ONE OCCASION?: WEEKLY

## 2019-10-24 SDOH — HEALTH STABILITY: MENTAL HEALTH: HOW MANY STANDARD DRINKS CONTAINING ALCOHOL DO YOU HAVE ON A TYPICAL DAY?: 1 OR 2

## 2019-10-24 ASSESSMENT — ANXIETY QUESTIONNAIRES
GAD7 TOTAL SCORE: 11
1. FEELING NERVOUS, ANXIOUS, OR ON EDGE: NEARLY EVERY DAY
IF YOU CHECKED OFF ANY PROBLEMS ON THIS QUESTIONNAIRE, HOW DIFFICULT HAVE THESE PROBLEMS MADE IT FOR YOU TO DO YOUR WORK, TAKE CARE OF THINGS AT HOME, OR GET ALONG WITH OTHER PEOPLE: SOMEWHAT DIFFICULT
5. BEING SO RESTLESS THAT IT IS HARD TO SIT STILL: SEVERAL DAYS
7. FEELING AFRAID AS IF SOMETHING AWFUL MIGHT HAPPEN: NOT AT ALL
3. WORRYING TOO MUCH ABOUT DIFFERENT THINGS: MORE THAN HALF THE DAYS
2. NOT BEING ABLE TO STOP OR CONTROL WORRYING: MORE THAN HALF THE DAYS
6. BECOMING EASILY ANNOYED OR IRRITABLE: SEVERAL DAYS

## 2019-10-24 ASSESSMENT — PATIENT HEALTH QUESTIONNAIRE - PHQ9
SUM OF ALL RESPONSES TO PHQ QUESTIONS 1-9: 14
5. POOR APPETITE OR OVEREATING: MORE THAN HALF THE DAYS
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
## 2019-10-25 ENCOUNTER — TELEPHONE (OUTPATIENT)
Dept: FAMILY MEDICINE | Facility: CLINIC | Age: 29
End: 2019-10-25

## 2019-10-25 ASSESSMENT — ANXIETY QUESTIONNAIRES: GAD7 TOTAL SCORE: 11

## 2019-10-25 NOTE — TELEPHONE ENCOUNTER
Routing to team to address forms below.  Joann Joe RN        Pt routed message below to referrals department.    Brandie has not received my HealthCare Provider paperwork for my leave request. I had a phone appointment with Dr. Wellington and she was filling out eh paperwork as we were talking. There should be a fax number on the docs, or the email to submit paperwork is here: [email or web address]. I submitted my paperwork separately. Please  let me know if there are any issues or questions.

## 2019-10-28 RX ORDER — GABAPENTIN 300 MG/1
300 CAPSULE ORAL 3 TIMES DAILY PRN
Qty: 90 CAPSULE | Refills: 0 | Status: SHIPPED | OUTPATIENT
Start: 2019-10-28 | End: 2020-01-16

## 2019-10-28 RX ORDER — LORAZEPAM 0.5 MG/1
0.5 TABLET ORAL DAILY PRN
Qty: 12 TABLET | Refills: 0 | Status: SHIPPED | OUTPATIENT
Start: 2019-10-28 | End: 2019-11-27

## 2019-10-28 NOTE — TELEPHONE ENCOUNTER
Reason for Call:  Other     Detailed comments: Pt called requesting an update on forms. Please call to discuss.     Phone Number Patient can be reached at: Home number on file [phone] (home)    Best Time: Anytime     Can we leave a detailed message on this number? YES    Call taken on 10/28/2019 at 2:07 PM by Candice Jennings

## 2019-10-30 NOTE — TELEPHONE ENCOUNTER
We discussed- the forms for disability need separate appointment - I have not seen him for that since 12/2018.  Virtual visit was for sit/stand desk. Thanks .

## 2019-10-30 NOTE — TELEPHONE ENCOUNTER
Clinic Action Needed: Yes, please contact patient via phone or MyChart    Presenting Problem: Pt calling back to check on the status of his forms.  Pt states the form needed is a disability form to be able to get a sit/stand desk for his sciatica.  He is not requesting additional forms, just the one for the desk.  When complete, please send to [email or web address]    Please notify patient when this has been completed and/or if you have any further questions.     Routed to: HAROON Overton RN/FNA

## 2019-10-31 ENCOUNTER — OFFICE VISIT (OUTPATIENT)
Dept: PSYCHOLOGY | Facility: CLINIC | Age: 29
End: 2019-10-31
Payer: COMMERCIAL

## 2019-10-31 DIAGNOSIS — F33.1 MAJOR DEPRESSIVE DISORDER, RECURRENT EPISODE, MODERATE (H): Primary | ICD-10-CM

## 2019-10-31 DIAGNOSIS — F41.1 GENERALIZED ANXIETY DISORDER: ICD-10-CM

## 2019-10-31 PROCEDURE — 90834 PSYTX W PT 45 MINUTES: CPT | Performed by: COUNSELOR

## 2019-10-31 ASSESSMENT — ANXIETY QUESTIONNAIRES
6. BECOMING EASILY ANNOYED OR IRRITABLE: SEVERAL DAYS
3. WORRYING TOO MUCH ABOUT DIFFERENT THINGS: MORE THAN HALF THE DAYS
2. NOT BEING ABLE TO STOP OR CONTROL WORRYING: MORE THAN HALF THE DAYS
7. FEELING AFRAID AS IF SOMETHING AWFUL MIGHT HAPPEN: SEVERAL DAYS
GAD7 TOTAL SCORE: 11
IF YOU CHECKED OFF ANY PROBLEMS ON THIS QUESTIONNAIRE, HOW DIFFICULT HAVE THESE PROBLEMS MADE IT FOR YOU TO DO YOUR WORK, TAKE CARE OF THINGS AT HOME, OR GET ALONG WITH OTHER PEOPLE: SOMEWHAT DIFFICULT
5. BEING SO RESTLESS THAT IT IS HARD TO SIT STILL: SEVERAL DAYS
1. FEELING NERVOUS, ANXIOUS, OR ON EDGE: MORE THAN HALF THE DAYS

## 2019-10-31 ASSESSMENT — PATIENT HEALTH QUESTIONNAIRE - PHQ9
SUM OF ALL RESPONSES TO PHQ QUESTIONS 1-9: 10
5. POOR APPETITE OR OVEREATING: MORE THAN HALF THE DAYS

## 2019-10-31 NOTE — PROGRESS NOTES
"                                           Progress Note    Client Name: Adam Fish  Date: 10/31/2019         Service Type: Individual  Video Visit: No     Session Start Time: 10:05a  Session End Time: 10:50a     Session Length: 40 minutes    Session #: 9    Attendees: Client attended alone     Treatment Plan Last Reviewed: 7/10/2019  PHQ-9 / JELANI-7: 10 & 11      DATA  Interactive Complexity: No  Crisis: No         Progress Since Last Session (Related to Symptoms / Goals / Homework):   Symptoms: No change- see Epic for PHQ 9 and JELANI 7 updates    Homework: Completed - continue to challenge negative self judgments and continue to engage in interpersonal effectiveness.      Episode of Care Goals: Satisfactory progress - ACTION (Actively working towards change); Intervened by reinforcing change plan / affirming steps taken     Current / Ongoing Stressors and Concerns:   Ongoing depression and anxiety; reported feeling like his thoughts are disorganized, does not experience having put together, cohesive stream of thoughts, can communicate some thoughts, but struggles to fully articulate all his thoughts, mind feels hot, sometimes experiences headaches from over thinking; discussed additional treatment options: ECT (discuseed with psychiatry), EMDR, support groups, more interpersonal connection in personal life; reported the only place he feels very comfortable with his thoughts is in therapy; reported active listening is the most effective skill at this time.     Treatment Objective(s) Addressed in This Session:    Client will decrease frequency and intensity of feeling down, depressed, hopeless as evidenced by checking perspectives, not comparing, and developing self awareness to \"figure out why I feel the way I feel and to be more comfortable in my own skin\". Client will learn 3 anxiety management and assertiveness skills to \"get through daily interactions confidently and comfortably, with less " "anxiety\".     Intervention:  CBT: assess maladaptive patterns of behavior and cognition, teach about ABC of CBT, teach challenging negative self talk, teach communication skills for social-emotional support, teach about empathy  Motivational Interviewing: assess and address ambivalence towards change and readiness and willingness to change, evoke change talk, challenge sustain talk, point out discrepancies, explore ambivalence towards change and road blocks, teach about OARS for interpersonal effectiveness  DBT: teach emotion identification, reinforce core mindfulness skills, teach TIPP skills for emotions regulation, teach scaling questions for emotion identification, teach core mindfulness skills          ASSESSMENT: Current Emotional / Mental Status (status of significant symptoms):   Risk status (Self / Other harm or suicidal ideation)  Client denies current fears or concerns for personal safety.  Client denies current or recent suicidal ideation or behaviors. Reports passive, fleeting thoughts about wanting to escape and not have to deal with things a couple weeks ago, but denies active plans or desire to end his life.   Client denies current or recent homicidal ideation or behaviors.  Client denies current or recent self injurious behavior or ideation.  Client denies other safety concerns.  Client reports the following protective factors: positive relationships positive family connections, restricted access to lethal means, dedication to family/friends, safe and stable environment, regular physical activity, adherence with prescribed medication, living with other people, daily obligations, structured day and effective problem-solving skills.    Patient reports there has been no change in risk factors since their last session.     Patient reports there has been no change in protective factors since their last session.     Recommended that patient call 911 or go to the local ED should there be a change in any of " these risk factors.     Appearance:   Appropriate    Eye Contact:   Fair    Psychomotor Behavior: Normal    Attitude:   Cooperative    Orientation:   All   Speech    Rate / Production: Normal     Volume:  Normal    Mood:    Anxious  Depressed    Affect:    Appropriate  Mood congruent  Bright at times   Thought Content:  Clear    Thought Form:  Coherent  Goal Directed  Logical    Insight:    Good      Medication Review:   See Epic for updates     Medication Compliance:   Yes     Changes in Health Issues:   None reported     Chemical Use Review:   Substance Use: Problem use continues with no change since last session, Patient declined discussion at this time        Tobacco Use: No current tobacco use      Diagnosis:   296.32 (F33.1) Major Depressive Disorder, Recurrent Episode, Moderate & 300.02 (F41.1) Generalized Anxiety Disorder     Collateral Reports Completed:   Not Applicable      PLAN: (Client Tasks / Therapist Tasks / Other)  Therapist will assign homework of skill practice; provide educational materials on distraction, relaxation, and assertiveness skills; role-play conflict management; teach emotional recognition/identification.    By next appt, client will f/u with EMDR appt and engage in more openness and communication about his depression with 1 person.        [facility]                                                         ______________________________________________________________________    Treatment Plan    Client's Name: Adam Fish  YOB: 1990    Date: 6/19/2019    Diagnoses: 296.32 (F33.1) Major Depressive Disorder, Recurrent Episode, Moderate & 300.02 (F41.1) Generalized Anxiety Disorder & Alcohol Use Disorder, Mild; RULE .23 (F40.10) Social Anxiety Disorder  Psychosocial & Contextual Factors: Lack of reward at work, limited social support re: mental/emotional needs  WHODAS: 24    Referral / Collaboration:  Referral to another professional/service is not  "indicated at this time.    Anticipated number of session or this episode of care: 3-4 months      MeasurableTreatment Goal(s) related to diagnosis / functional impairment(s)  Goal 1: Client will improve mood and alcohol related issues as evidenced by decreased score on PHQ 9 from 15 (moderately severe) to 10 or less (moderate-mild).    I will know I've met my goal when I feel more comfortable and not doubt myself.      Objective #A (Client Action)    Client will decrease frequency and intensity of feeling down, depressed, hopeless as evidenced by checking perspectives, not comparing, and developing self awareness to \"figure out why I feel the way I feel and to be more comfortable in my own skin\".  Status: New - Date: 6/19/2019     Intervention(s)  Therapist will assign homework of cognitive, mood, and behavioral tracking/journaling; provide educational materials on cognitive distortions; role-play conflict management; teach the client how to perform a behavioral chain analysis.    Goal 2: Client will better manage anxiety as evidenced by decreased score on JELANI 7 from 15 (severe) to 10 or less (moderate).    I will know I've met my goal when I do not avoid people, stop over thinking, and speak up more.      Objective #A (Client Action)    Client will learn 3 anxiety management and assertiveness skills to \"get through daily interactions confidently and comfortably, with less anxiety\".  Status: New - Date: 6/19/2019     Intervention(s)  Therapist will assign homework of skill practice; provide educational materials on distraction, relaxation, and assertiveness skills; role-play conflict management; teach emotional recognition/identification.      Patient has reviewed and agreed to the above plan.      IOANA Pearson  June 19, 2019  "

## 2019-10-31 NOTE — TELEPHONE ENCOUNTER
"Called patient and provided information per provider  Patient states that when he spoke with provider he was informed that the form was completed and send to the insurance company. \"That the whole point of the telephone call was for forms to be completed and he wants a refund for that visit\"    I apologized but informed patient that was out of my department but that I would send the information to our clinic manager, but as far as the forms their has to be a face to face appointment as the forms are very detailed and they need a physical evaluation. There forms cannot be completed during a phone visit.    Patient said, \" I am busy right now to schedule the appointment but I will call back to schedule the appointment, as far as the telephone visit I want a refund since it did not serve it's purpose of getting my forms completed. \"    Informed patient I would send his message to clinic manager to follow up on this.   "

## 2019-11-02 ASSESSMENT — ANXIETY QUESTIONNAIRES: GAD7 TOTAL SCORE: 11

## 2019-11-07 ENCOUNTER — OFFICE VISIT (OUTPATIENT)
Dept: FAMILY MEDICINE | Facility: CLINIC | Age: 29
End: 2019-11-07
Payer: COMMERCIAL

## 2019-11-07 VITALS
OXYGEN SATURATION: 97 % | WEIGHT: 210 LBS | RESPIRATION RATE: 18 BRPM | HEIGHT: 72 IN | DIASTOLIC BLOOD PRESSURE: 80 MMHG | TEMPERATURE: 98.6 F | SYSTOLIC BLOOD PRESSURE: 134 MMHG | HEART RATE: 57 BPM | BODY MASS INDEX: 28.44 KG/M2

## 2019-11-07 DIAGNOSIS — F41.8 DEPRESSION WITH ANXIETY: ICD-10-CM

## 2019-11-07 DIAGNOSIS — R03.0 ELEVATED BP WITHOUT DIAGNOSIS OF HYPERTENSION: ICD-10-CM

## 2019-11-07 DIAGNOSIS — M54.41 CHRONIC MIDLINE LOW BACK PAIN WITH RIGHT-SIDED SCIATICA: Primary | ICD-10-CM

## 2019-11-07 DIAGNOSIS — G89.29 CHRONIC MIDLINE LOW BACK PAIN WITH RIGHT-SIDED SCIATICA: Primary | ICD-10-CM

## 2019-11-07 PROCEDURE — 99214 OFFICE O/P EST MOD 30 MIN: CPT | Performed by: FAMILY MEDICINE

## 2019-11-07 ASSESSMENT — MIFFLIN-ST. JEOR: SCORE: 1950.55

## 2019-11-07 NOTE — PROGRESS NOTES
Subjective     Adam Fish is a 29 year old male who presents to clinic today for the following health issues:    HPI   Patient is here to complete [employer] forms  TE is refunded as we did not have his part of the FORMS, neither the correct forms that needed to be filled out and faxed.  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  He has benefited from sit and stand desk a lot and needs the [employer] from  To be filled out for approval for reimbursement. He reports no lower limb weakness, saddle numbness or worsening of symptoms as long as he is able to do home based excercise and avoid prolong sitting posture      We also discussed long standing history of depression and anxiety- Under care of Dr psychiatrist. He sees his therapist regularly, not sure where to go with medications as still struggles with depression and anxiety-  he denies suicidal thoughts or ideation.reports no side effects from medications. Would like to continue.    Initial Blood pressure was high- recheck is better    PROBLEMS TO ADD ON...    BP Readings from Last 3 Encounters:   11/07/19 134/80   10/24/19 138/83   09/26/19 127/88    Wt Readings from Last 3 Encounters:   11/07/19 95.3 kg (210 lb)   10/24/19 94.7 kg (208 lb 11.2 oz)   09/26/19 94.8 kg (209 lb)                    Reviewed and updated as needed this visit by Provider         Review of Systems   ROS COMP: Constitutional, HEENT, cardiovascular, pulmonary, GI, , musculoskeletal, neuro, skin, endocrine and psych systems are negative, except as otherwise noted.      Objective    /80   Pulse 57   Temp 98.6  F (37  C) (Oral)   Resp 18   Ht 1.829 m (6')   Wt 95.3 kg (210 lb)   SpO2 97%   BMI 28.48 kg/m    Body mass index is 28.48 kg/m .  Physical Exam   GENERAL: healthy, alert and no distress  NECK: no adenopathy, no asymmetry, masses, or scars and thyroid normal to palpation  RESP: lungs clear to auscultation - no rales,  rhonchi or wheezes  CV: regular rate and rhythm, normal S1 S2, no S3 or S4, no murmur, click or rub, no peripheral edema and peripheral pulses strong  ABDOMEN: soft, nontender, no hepatosplenomegaly, no masses and bowel sounds normal  MS: no gross musculoskeletal defects noted, no edema  NEURO: Normal strength and tone, mentation intact and speech normal  PSYCH: mentation appears normal, affect normal/bright    Diagnostic Test Results:  Labs reviewed in Epic        Assessment & Plan     1. Chronic midline low back pain with right-sided sciatica  [employer] forms completed- he should benefit from  Adjustable working desk.  Forms are faxed today and a copy is handed to the patient.  He also needed to show his part of the from - that I was able to see on his portal- filled out , regarding his symptoms.    encouraged him t stay active as tolerated and excercise, nasima lower back stretches and core strengthening.    2. Depression with anxiety  Under care of psychiatrist  PHQ-9 SCORE 9/26/2019 10/24/2019 10/31/2019   PHQ-9 Total Score - - -   PHQ-9 Total Score 10 14 10     JELANI-7 SCORE 9/26/2019 10/24/2019 10/31/2019   Total Score - - -   Total Score 10 11 11   he is currently on bupropion, fluoxetine, Neurontin , lorazepam. & trazodone   Consider gene sight mapping- he will talk with his provider     3. Elevated BP without diagnosis of hypertension  -Normal  BP is  119/79 or lower. Upper number is systolic Blood pressure (SBP). Lower number is diastolic  Blood pressure (DBP)  -Blood pressure between 120-139/80-89 (prehypertensive blood pressure/ class 1 hypertension )   -Hypertensive is  BP of 140/90 or above and needs treatment with medication.  -life style changes that are beneficial to reach goal of normal Blood pressure   1.Weight loss of 1 kg can reduce systolic Blood pressure  (SBP) by 1 mmHg  2.Health healthy diet (eg DASH dietary pattern can reduce SBP by 11 mmHg)  3.Structured excercise program (150 mins aerobic  activity/week can reduce SBP by 5 mmHg)  4.Low salt  diet - very important.(eg: cut by 255 or 1000 mg/day to reduce SBP by 5mmHg)  5. Increase 4-5 serving of fresh vegetables & fruits /day- good source of potassium.    Other beneficial tips. Complete smoke cessation- if smoking  Reduction of alcohol     if More than 140/90  after a month of above life style changes  Return visit with MD/provider  for recheck & consideration of medications .            BMI:   Estimated body mass index is 28.48 kg/m  as calculated from the following:    Height as of this encounter: 1.829 m (6').    Weight as of this encounter: 95.3 kg (210 lb).           Regular exercise    Return in about 4 weeks (around 12/5/2019) for concerns,unresolved.    Erinn Wellington MD  [facility]

## 2019-11-07 NOTE — NURSING NOTE
Chief Complaint   Patient presents with     Forms     BP (!) 144/80   Pulse 57   Temp 98.6  F (37  C) (Oral)   Resp 18   Ht 1.829 m (6')   Wt 95.3 kg (210 lb)   SpO2 97%   BMI 28.48 kg/m   Estimated body mass index is 28.48 kg/m  as calculated from the following:    Height as of this encounter: 1.829 m (6').    Weight as of this encounter: 95.3 kg (210 lb).  bp completed using cuff size: regular      Health Maintenance addressed:  NONE    n/a    Tiara Mares MA

## 2019-11-14 ENCOUNTER — OFFICE VISIT (OUTPATIENT)
Dept: PSYCHOLOGY | Facility: CLINIC | Age: 29
End: 2019-11-14
Payer: COMMERCIAL

## 2019-11-14 DIAGNOSIS — F41.1 GENERALIZED ANXIETY DISORDER: ICD-10-CM

## 2019-11-14 DIAGNOSIS — F33.1 MAJOR DEPRESSIVE DISORDER, RECURRENT EPISODE, MODERATE (H): Primary | ICD-10-CM

## 2019-11-14 PROCEDURE — 90834 PSYTX W PT 45 MINUTES: CPT | Performed by: COUNSELOR

## 2019-11-14 ASSESSMENT — ANXIETY QUESTIONNAIRES
6. BECOMING EASILY ANNOYED OR IRRITABLE: MORE THAN HALF THE DAYS
2. NOT BEING ABLE TO STOP OR CONTROL WORRYING: MORE THAN HALF THE DAYS
GAD7 TOTAL SCORE: 11
7. FEELING AFRAID AS IF SOMETHING AWFUL MIGHT HAPPEN: SEVERAL DAYS
1. FEELING NERVOUS, ANXIOUS, OR ON EDGE: MORE THAN HALF THE DAYS
IF YOU CHECKED OFF ANY PROBLEMS ON THIS QUESTIONNAIRE, HOW DIFFICULT HAVE THESE PROBLEMS MADE IT FOR YOU TO DO YOUR WORK, TAKE CARE OF THINGS AT HOME, OR GET ALONG WITH OTHER PEOPLE: SOMEWHAT DIFFICULT
5. BEING SO RESTLESS THAT IT IS HARD TO SIT STILL: SEVERAL DAYS
3. WORRYING TOO MUCH ABOUT DIFFERENT THINGS: SEVERAL DAYS

## 2019-11-14 ASSESSMENT — PATIENT HEALTH QUESTIONNAIRE - PHQ9
SUM OF ALL RESPONSES TO PHQ QUESTIONS 1-9: 9
5. POOR APPETITE OR OVEREATING: MORE THAN HALF THE DAYS

## 2019-11-14 NOTE — PROGRESS NOTES
"                                           Progress Note    Client Name: Adam Fish  Date: 11/14/2019         Service Type: Individual  Video Visit: No     Session Start Time: 10:05a  Session End Time: 10:50a     Session Length: 45 minutes    Session #: 10    Attendees: Client attended alone     Treatment Plan Last Reviewed: 11/14/2019  PHQ-9 / JELANI-7: 9 & 11      DATA  Interactive Complexity: No  Crisis: No         Progress Since Last Session (Related to Symptoms / Goals / Homework):   Symptoms: Mild improvements- see Epic for PHQ 9 and JELANI 7 updates    Homework: Completed - f/u with EMDR appt and engage in more openness and communication about his depression with 1 person.      Episode of Care Goals: Satisfactory progress - ACTION (Actively working towards change); Intervened by reinforcing change plan / affirming steps taken     Current / Ongoing Stressors and Concerns:   Ongoing depression and anxiety with mild improvements due to opening up to a few friends and supervisor about mental health; for the most part, felt validated and supported during these interactions, felt good that he can give and take in relationships, even when he is talking about his mental health; reported need to develop \"go to coping skills\" as things are slowly improving, but change is not sustained; acknowledged tendency to perseverate on big picture, life purpose thinking.     Treatment Objective(s) Addressed in This Session:    Client will decrease frequency and intensity of feeling down, depressed, hopeless as evidenced by checking perspectives, not comparing, and developing self awareness to \"figure out why I feel the way I feel and to be more comfortable in my own skin\". Client will learn 3 anxiety management and assertiveness skills to \"get through daily interactions confidently and comfortably, with less anxiety\" - listening, talking, open dialogue, nasima with family.     Intervention:  CBT: assess maladaptive patterns of " behavior and cognition, teach about ABC of CBT, teach challenging negative self talk, teach communication skills for social-emotional support, teach about empathy  Motivational Interviewing: assess and address ambivalence towards change and readiness and willingness to change, evoke change talk, challenge sustain talk, point out discrepancies, explore ambivalence towards change and road blocks, teach about OARS for interpersonal effectiveness  DBT: teach emotion identification, reinforce core mindfulness skills, teach TIPP skills for emotions regulation, teach scaling questions for emotion identification, teach core mindfulness skills          ASSESSMENT: Current Emotional / Mental Status (status of significant symptoms):   Risk status (Self / Other harm or suicidal ideation)  Client denies current fears or concerns for personal safety.  Client denies current or recent suicidal ideation or behaviors. Reports passive, fleeting thoughts about wanting to escape and not have to deal with things a couple weeks ago, but denies active plans or desire to end his life.   Client denies current or recent homicidal ideation or behaviors.  Client denies current or recent self injurious behavior or ideation.  Client denies other safety concerns.  Client reports the following protective factors: positive relationships positive family connections, restricted access to lethal means, dedication to family/friends, safe and stable environment, regular physical activity, adherence with prescribed medication, living with other people, daily obligations, structured day and effective problem-solving skills.    Patient reports there has been no change in risk factors since their last session.     Patient reports there has been no change in protective factors since their last session.     Recommended that patient call 911 or go to the local ED should there be a change in any of these risk factors.     Appearance:   Appropriate    Eye  Contact:   Fair    Psychomotor Behavior: Normal    Attitude:   Cooperative    Orientation:   All   Speech    Rate / Production: Normal     Volume:  Normal    Mood:    Anxious  Depressed    Affect:    Appropriate  Mood congruent  Bright at times   Thought Content:  Clear    Thought Form:  Coherent  Goal Directed  Logical    Insight:    Good      Medication Review:   See Epic for updates     Medication Compliance:   Yes     Changes in Health Issues:   None reported     Chemical Use Review:   Substance Use: Problem use continues with no change since last session, Patient declined discussion at this time        Tobacco Use: No current tobacco use      Diagnosis:   296.32 (F33.1) Major Depressive Disorder, Recurrent Episode, Moderate & 300.02 (F41.1) Generalized Anxiety Disorder     Collateral Reports Completed:   Not Applicable      PLAN: (Client Tasks / Therapist Tasks / Other)  Therapist will assign homework of skill practice; provide educational materials on distraction, relaxation, and assertiveness skills; role-play conflict management; teach emotional recognition/identification.    By next appt, client will try meditation.        Lexi Han LPC                                                         ______________________________________________________________________    Treatment Plan    Client's Name: Adam Fish  YOB: 1990    Date: 11/14/2019    Diagnoses: 296.32 (F33.1) Major Depressive Disorder, Recurrent Episode, Moderate & 300.02 (F41.1) Generalized Anxiety Disorder & Alcohol Use Disorder, Mild; RULE .23 (F40.10) Social Anxiety Disorder  Psychosocial & Contextual Factors: Lack of reward at work, limited social support re: mental/emotional needs  WHODAS: 24    Referral / Collaboration:  Referral to another professional/service is not indicated at this time.    Anticipated number of session or this episode of care: 3-4 months      MeasurableTreatment Goal(s) related to diagnosis  "/ functional impairment(s)  Goal 1: Client will improve mood and alcohol related issues as evidenced by decreased score on PHQ 9 from 15 (moderately severe) to 10 or less (moderate-mild).    I will know I've met my goal when I feel more comfortable and not doubt myself.      Objective #A (Client Action)    Client will decrease frequency and intensity of feeling down, depressed, hopeless as evidenced by checking perspectives, not comparing, and developing self awareness to \"figure out why I feel the way I feel and to be more comfortable in my own skin\".  Status: Continued - Date: 11/14/2019    Intervention(s)  Therapist will assign homework of cognitive, mood, and behavioral tracking/journaling; provide educational materials on cognitive distortions; role-play conflict management; teach the client how to perform a behavioral chain analysis.    Goal 2: Client will better manage anxiety as evidenced by decreased score on JELANI 7 from 15 (severe) to 10 or less (moderate).    I will know I've met my goal when I do not avoid people, stop over thinking, and speak up more.      Objective #A (Client Action)    Client will learn 3 anxiety management and assertiveness skills to \"get through daily interactions confidently and comfortably, with less anxiety\" - listening, talking, open dialogue, nasima with family.  Status: Continued - Date: 11/14/2019, active listening is helping      Intervention(s)  Therapist will assign homework of skill practice; provide educational materials on distraction, relaxation, and assertiveness skills; role-play conflict management; teach emotional recognition/identification.      Patient has reviewed and agreed to the above plan.      IOANA Pearson  11/14/2019  "

## 2019-11-15 ASSESSMENT — ANXIETY QUESTIONNAIRES: GAD7 TOTAL SCORE: 11

## 2019-11-27 ENCOUNTER — OFFICE VISIT (OUTPATIENT)
Dept: BEHAVIORAL HEALTH | Facility: CLINIC | Age: 29
End: 2019-11-27
Payer: COMMERCIAL

## 2019-11-27 VITALS
DIASTOLIC BLOOD PRESSURE: 91 MMHG | BODY MASS INDEX: 28.07 KG/M2 | TEMPERATURE: 98.4 F | SYSTOLIC BLOOD PRESSURE: 137 MMHG | WEIGHT: 207 LBS | HEART RATE: 69 BPM | OXYGEN SATURATION: 96 %

## 2019-11-27 DIAGNOSIS — F41.1 GAD (GENERALIZED ANXIETY DISORDER): ICD-10-CM

## 2019-11-27 DIAGNOSIS — F34.1 DYSTHYMIA: Primary | ICD-10-CM

## 2019-11-27 DIAGNOSIS — F41.0 ANXIETY ATTACK: ICD-10-CM

## 2019-11-27 PROCEDURE — 99214 OFFICE O/P EST MOD 30 MIN: CPT | Performed by: PSYCHIATRY & NEUROLOGY

## 2019-11-27 RX ORDER — BUSPIRONE HYDROCHLORIDE 15 MG/1
TABLET ORAL
Qty: 60 TABLET | Refills: 0 | Status: SHIPPED | OUTPATIENT
Start: 2019-11-27 | End: 2020-01-02 | Stop reason: DRUGHIGH

## 2019-11-27 RX ORDER — LORAZEPAM 0.5 MG/1
0.5 TABLET ORAL DAILY PRN
Qty: 12 TABLET | Refills: 0 | Status: SHIPPED | OUTPATIENT
Start: 2019-11-27 | End: 2020-01-02

## 2019-11-27 RX ORDER — FLUOXETINE 40 MG/1
40 CAPSULE ORAL DAILY
Qty: 90 CAPSULE | Refills: 1 | Status: SHIPPED | OUTPATIENT
Start: 2019-11-27 | End: 2020-06-17

## 2019-11-27 NOTE — PROGRESS NOTES
Outpatient Psychiatric Progress Note    Name: Adam Fish   : 1990                    Primary Care Provider: Lulu Cope MD   Therapist: Héctor Pearson     PHQ-9 scores:  PHQ-9 SCORE 10/24/2019 10/31/2019 2019   PHQ-9 Total Score - - -   PHQ-9 Total Score 14 10 9       JELANI-7 scores:  JELANI-7 SCORE 10/24/2019 10/31/2019 2019   Total Score - - -   Total Score 11 11 11       Patient Identification:  Adam is a 29 year old year old, in a relationship   White American male  who presents for return visit with me.  Patient attended the session alone.     Interim History:  Adam reports that he is feeling a little better.  He has a little more energy and motivation, especially at work.  He still endorses feeling anxious and depressed.  He has difficulty relaxing, and finds it hard to control his worry.  He reports decreased interest in his normal activities, feeling down, feeling and feeling bad about himself.    He has been having difficulty sleeping.  He wakes in the middle of the night and is unable to return to sleep once every week or 2.  Recently, he had a night that he woke up at about 4 in the morning and did not return to sleep.  In general he sleeps 7 to 8 hours a night, but reports that he has trouble falling asleep.  He has tried trazodone in the past, but when he took it he fell asleep quickly but it woke up in the middle the night and was not able to return to sleep.  He took a higher dose, but then he was overly sedated the next day.    He is drinking a little less.  He has been keeping track of his drinks, and is drinking a maximum of 7 per occasion.  He was drinking heavily 2 or 3 times a week, now it is usually once or twice per weekend, he may also have a drink with a meal on another occasion during the week.    We reviewed the results of his genetic testing.  He is currently on Wellbutrin and fluoxetine, both of which are considered to have moderate gene drug  interactions.  We reviewed these interactions, and agreed to continue with these medications at his request.  In addition, we agreed to a trial of buspirone 15 mg a day for 1 week then increasing to 30 mg daily.  If for some reason he does not tolerate this combination, we may want to try a newer antidepressant such as Viibryd.    Vital Signs:   BP (!) 137/91 (BP Location: Right arm, Patient Position: Sitting, Cuff Size: Adult Large)   Pulse 69   Temp 98.4  F (36.9  C) (Oral)   Wt 93.9 kg (207 lb)   SpO2 96%   BMI 28.07 kg/m      Current Medications:  Current Outpatient Medications   Medication     buPROPion (WELLBUTRIN XL) 300 MG 24 hr tablet     busPIRone (BUSPAR) 15 MG tablet     FLUoxetine (PROZAC) 40 MG capsule     gabapentin (NEURONTIN) 300 MG capsule     LORazepam (ATIVAN) 0.5 MG tablet     No current facility-administered medications for this visit.       Mental Status Examination:  Adam is a 29-year-old man who appears his stated age and in no acute distress.  He is neatly groomed in casual clothing.  Speech is clear and normal in rate and tone.  Eye contact is good.  Motor behavior is appropriate.  Affect is slightly blunted.  Mood is anxious.  Thoughts are logical and spontaneous with no loose associations or flight of ideas.  Thought content shows no psychosis.  No suicidal or homicidal thoughts.  He is alert and oriented x3.    Assessment and Plan:    ICD-10-CM    1. Dysthymia F34.1    2. JELANI (generalized anxiety disorder) F41.1 busPIRone (BUSPAR) 15 MG tablet   3. Anxiety attack F41.0 LORazepam (ATIVAN) 0.5 MG tablet       Medical comorbidities include:   Patient Active Problem List   Diagnosis     CARDIOVASCULAR SCREENING; LDL GOAL LESS THAN 160     Impingement syndrome, shoulder     Depression with anxiety     Elevated BP without diagnosis of hypertension     Chronic midline low back pain with right-sided sciatica     Dysthymia       Treatment Plan:  Patient Instructions   Start buspirone 15  mg a day for a week, then increase to 15 mg twice daily.    Continue all other medications per your primary care provider.    Schedule an appointment with me in 5 weeks or sooner as needed.      Call [facility] at [phone] to schedule or schedule at the .     [facility] Resources:      Go to the Emergency Department as needed or call after hours crisis line at [phone].      To schedule individual or family therapy, call [facility] at [phone].     Follow up with primary care provider as planned or sooner for acute medical concerns.    Call the psychiatric nurse line with medication questions or concerns at [phone].    [employer] may be used to communicate with your provider, but this is not intended to be used for emergencies.    Community Resources:      National Suicide Prevention Lifeline: 150.159.1280 (TTY: 299.295.5772). Call anytime for help.  (www.suicidepreventionlifeline.org)    National [place] on Mental Illness (www.rosalio.org): 452.354.9349 or 317-485-3870.     Mental Health Association (www.mentalhealth.org): 522.756.8019 or 930-521-3927.    [place] Crisis Text Line: Text [place] to 908089    Suicide LifeLine Chat: suicidepreLSN Mobileline.org/chat     Administrative Billing:   I spent approximately 35 minutes with Adam, greater than 50% in counseling regarding the above diagnosis, laboratory studies, and treatment.    Patient Status:  Patient will continue to be seen for ongoing consultation and stabilization.

## 2019-11-27 NOTE — PATIENT INSTRUCTIONS
Start buspirone 15 mg a day for a week, then increase to 15 mg twice daily.    Continue all other medications per your primary care provider.    Schedule an appointment with me in 5 weeks or sooner as needed.      Call [facility] at [phone] to schedule or schedule at the .     [facility] Resources:      Go to the Emergency Department as needed or call after hours crisis line at [phone].      To schedule individual or family therapy, call [facility] at [phone].     Follow up with primary care provider as planned or sooner for acute medical concerns.    Call the psychiatric nurse line with medication questions or concerns at [phone].    [employer] may be used to communicate with your provider, but this is not intended to be used for emergencies.    Community Resources:      National Suicide Prevention Lifeline: 594.393.3722 (TTY: 159.852.9335). Call anytime for help.  (www.suicidepreventionlifeline.org)    National [place] on Mental Illness (www.rosalio.org): 272.555.5480 or 369-349-2559.     Mental Health Association (www.mentalhealth.org): 984.971.6049 or 689-699-5125.    [place] Crisis Text Line: Text [place] to 640016    Suicide LifeLine Chat: suicidepreQuotaDecklifeline.org/chat

## 2019-12-02 ASSESSMENT — PATIENT HEALTH QUESTIONNAIRE - PHQ9: SUM OF ALL RESPONSES TO PHQ QUESTIONS 1-9: 9

## 2019-12-06 ENCOUNTER — OFFICE VISIT (OUTPATIENT)
Dept: PSYCHOLOGY | Facility: CLINIC | Age: 29
End: 2019-12-06
Payer: COMMERCIAL

## 2019-12-06 DIAGNOSIS — F33.1 MAJOR DEPRESSIVE DISORDER, RECURRENT EPISODE, MODERATE (H): ICD-10-CM

## 2019-12-06 DIAGNOSIS — F41.1 GENERALIZED ANXIETY DISORDER: Primary | ICD-10-CM

## 2019-12-06 PROCEDURE — 90834 PSYTX W PT 45 MINUTES: CPT | Performed by: COUNSELOR

## 2019-12-06 NOTE — PROGRESS NOTES
"                                           Progress Note    Client Name: Adam Fish  Date: 12/6/2019         Service Type: Individual  Video Visit: No     Session Start Time: 8:00a  Session End Time: 8:45a     Session Length: 45 minutes    Session #: 11    Attendees: Client attended alone     Treatment Plan Last Reviewed: 11/14/2019  PHQ-9 / JELANI-7: 9 & 11      DATA  Interactive Complexity: No  Crisis: No         Progress Since Last Session (Related to Symptoms / Goals / Homework):   Symptoms: Improvements with medication increase/add on - see Epic for PHQ 9 and JELANI 7 updates    Homework: Not completed - try meditation (will look into [facility]).      Episode of Care Goals: Satisfactory progress - ACTION (Actively working towards change); Intervened by reinforcing change plan / affirming steps taken     Current / Ongoing Stressors and Concerns:   Reported improved mood and anxiety with medication increase and new medication add on; reported improved ability to have small talk, less anxious in social interactions in general, but acknowledged feeling more anxious this week potentially due to poor sleep and physical illness; inquired about the brain - is it genetic, does it stay the same, can it change...; expressed needing direction forward to optimize his progress, wanting more meditative, mindfulness practices.      Treatment Objective(s) Addressed in This Session:    Client will decrease frequency and intensity of feeling down, depressed, hopeless as evidenced by checking perspectives, not comparing, and developing self awareness to \"figure out why I feel the way I feel and to be more comfortable in my own skin\". Client will learn 3 anxiety management and assertiveness skills to \"get through daily interactions confidently and comfortably, with less anxiety\" - listening, talking, open dialogue, nasima with family.     Intervention:  CBT: assess maladaptive patterns of behavior and cognition, teach about ABC of CBT, " teach challenging negative self talk, teach communication skills for social-emotional support, teach about empathy  Motivational Interviewing: assess and address ambivalence towards change and readiness and willingness to change, evoke change talk, challenge sustain talk, point out discrepancies, explore ambivalence towards change and road blocks, teach about OARS for interpersonal effectiveness  DBT: teach emotion identification, reinforce core mindfulness skills, teach TIPP skills for emotions regulation, teach scaling questions for emotion identification, teach core mindfulness skills          ASSESSMENT: Current Emotional / Mental Status (status of significant symptoms):   Risk status (Self / Other harm or suicidal ideation)  Client denies current fears or concerns for personal safety.  Client denies current or recent suicidal ideation or behaviors. Reports passive, fleeting thoughts about wanting to escape and not have to deal with things a couple weeks ago, but denies active plans or desire to end his life.   Client denies current or recent homicidal ideation or behaviors.  Client denies current or recent self injurious behavior or ideation.  Client denies other safety concerns.  Client reports the following protective factors: positive relationships positive family connections, restricted access to lethal means, dedication to family/friends, safe and stable environment, regular physical activity, adherence with prescribed medication, living with other people, daily obligations, structured day and effective problem-solving skills.    Patient reports there has been no change in risk factors since their last session.     Patient reports there has been no change in protective factors since their last session.     Recommended that patient call 911 or go to the local ED should there be a change in any of these risk factors.     Appearance:   Appropriate    Eye Contact:   Fair    Psychomotor Behavior: Normal  "   Attitude:   Cooperative    Orientation:   All   Speech    Rate / Production: Normal     Volume:  Normal    Mood:    \"Better\"    Affect:    Appropriate  Somewhat restricted    Thought Content:  Clear    Thought Form:  Coherent  Goal Directed  Logical    Insight:    Good      Medication Review:   See Epic for updates     Medication Compliance:   Yes     Changes in Health Issues:   None reported     Chemical Use Review:   Substance Use: Problem use continues with no change since last session, Patient declined discussion at this time        Tobacco Use: No current tobacco use      Diagnosis:   296.32 (F33.1) Major Depressive Disorder, Recurrent Episode, Moderate & 300.02 (F41.1) Generalized Anxiety Disorder     Collateral Reports Completed:   Not Applicable      PLAN: (Client Tasks / Therapist Tasks / Other)  Therapist will assign homework of skill practice; provide educational materials on distraction, relaxation, and assertiveness skills; role-play conflict management; teach emotional recognition/identification. Follow up on EMDR and [facility] referral.    By next appt, client will continue to reinforce \"go to\" skills - listening, 3 good things, and having something to focus on.        [facility]                                                         ______________________________________________________________________    Treatment Plan    Client's Name: Adam Fish  YOB: 1990    Date: 11/14/2019    Diagnoses: 296.32 (F33.1) Major Depressive Disorder, Recurrent Episode, Moderate & 300.02 (F41.1) Generalized Anxiety Disorder & Alcohol Use Disorder, Mild; RULE .23 (F40.10) Social Anxiety Disorder  Psychosocial & Contextual Factors: Lack of reward at work, limited social support re: mental/emotional needs  WHODAS: 24    Referral / Collaboration:  Referral to another professional/service is not indicated at this time.    Anticipated number of session or this episode of care: 3-4 " "months      MeasurableTreatment Goal(s) related to diagnosis / functional impairment(s)  Goal 1: Client will improve mood and alcohol related issues as evidenced by decreased score on PHQ 9 from 15 (moderately severe) to 10 or less (moderate-mild).    I will know I've met my goal when I feel more comfortable and not doubt myself.      Objective #A (Client Action)    Client will decrease frequency and intensity of feeling down, depressed, hopeless as evidenced by checking perspectives, not comparing, and developing self awareness to \"figure out why I feel the way I feel and to be more comfortable in my own skin\".  Status: Continued - Date: 11/14/2019    Intervention(s)  Therapist will assign homework of cognitive, mood, and behavioral tracking/journaling; provide educational materials on cognitive distortions; role-play conflict management; teach the client how to perform a behavioral chain analysis.    Goal 2: Client will better manage anxiety as evidenced by decreased score on JELANI 7 from 15 (severe) to 10 or less (moderate).    I will know I've met my goal when I do not avoid people, stop over thinking, and speak up more.      Objective #A (Client Action)    Client will learn 3 anxiety management and assertiveness skills to \"get through daily interactions confidently and comfortably, with less anxiety\" - listening, talking, open dialogue, nasima with family.  Status: Continued - Date: 11/14/2019, active listening is helping      Intervention(s)  Therapist will assign homework of skill practice; provide educational materials on distraction, relaxation, and assertiveness skills; role-play conflict management; teach emotional recognition/identification.      Patient has reviewed and agreed to the above plan.      IOANA Pearson  11/14/2019  "

## 2019-12-19 ENCOUNTER — OFFICE VISIT (OUTPATIENT)
Dept: PSYCHOLOGY | Facility: CLINIC | Age: 29
End: 2019-12-19
Payer: COMMERCIAL

## 2019-12-19 DIAGNOSIS — F41.1 GENERALIZED ANXIETY DISORDER: Primary | ICD-10-CM

## 2019-12-19 DIAGNOSIS — F33.1 MAJOR DEPRESSIVE DISORDER, RECURRENT EPISODE, MODERATE (H): ICD-10-CM

## 2019-12-19 PROCEDURE — 90834 PSYTX W PT 45 MINUTES: CPT | Performed by: COUNSELOR

## 2019-12-19 ASSESSMENT — ANXIETY QUESTIONNAIRES
5. BEING SO RESTLESS THAT IT IS HARD TO SIT STILL: SEVERAL DAYS
6. BECOMING EASILY ANNOYED OR IRRITABLE: SEVERAL DAYS
1. FEELING NERVOUS, ANXIOUS, OR ON EDGE: MORE THAN HALF THE DAYS
2. NOT BEING ABLE TO STOP OR CONTROL WORRYING: SEVERAL DAYS
IF YOU CHECKED OFF ANY PROBLEMS ON THIS QUESTIONNAIRE, HOW DIFFICULT HAVE THESE PROBLEMS MADE IT FOR YOU TO DO YOUR WORK, TAKE CARE OF THINGS AT HOME, OR GET ALONG WITH OTHER PEOPLE: SOMEWHAT DIFFICULT
7. FEELING AFRAID AS IF SOMETHING AWFUL MIGHT HAPPEN: SEVERAL DAYS
3. WORRYING TOO MUCH ABOUT DIFFERENT THINGS: SEVERAL DAYS
GAD7 TOTAL SCORE: 8

## 2019-12-19 ASSESSMENT — PATIENT HEALTH QUESTIONNAIRE - PHQ9
5. POOR APPETITE OR OVEREATING: SEVERAL DAYS
SUM OF ALL RESPONSES TO PHQ QUESTIONS 1-9: 8

## 2019-12-19 NOTE — PROGRESS NOTES
"                                           Progress Note    Client Name: Adam Fish  Date: 12/19/2019         Service Type: Individual  Video Visit: No     Session Start Time: 10:00a  Session End Time: 10:45a     Session Length: 45 minutes    Session #: 12    Attendees: Client attended alone     Treatment Plan Last Reviewed: 11/14/2019  PHQ-9 / JELANI-7: 8 & 8      DATA  Interactive Complexity: No  Crisis: No         Progress Since Last Session (Related to Symptoms / Goals / Homework):   Symptoms: Stable - see Epic for PHQ 9 and JELANI 7 updates    Homework: Ongoing - continue to reinforce \"go to\" skills - listening, 3 good things, and having something to focus on.      Episode of Care Goals: Satisfactory progress - ACTION (Actively working towards change); Intervened by reinforcing change plan / affirming steps taken     Current / Ongoing Stressors and Concerns:   Reported stable mood and anxiety; reported feeling good about all fronts of mental health - individual therapy for day to day support, EMDR for processing specific past experiences, and psychiatry for medications; ongoing work to challenge negative, comparative thinking; reported family relationships can be hard re: these thoughts as brothers present as more confident, able to reach goals, able to work pass emotional conflict; acknowledged it is hard for him to acknowledge the positives.     Treatment Objective(s) Addressed in This Session:    Client will decrease frequency and intensity of feeling down, depressed, hopeless as evidenced by checking perspectives, not comparing, and developing self awareness to \"figure out why I feel the way I feel and to be more comfortable in my own skin\". Client will learn 3 anxiety management and assertiveness skills to \"get through daily interactions confidently and comfortably, with less anxiety\" - listening, talking, open dialogue, nasima with family.     Intervention:  CBT: assess maladaptive patterns of behavior and " cognition, teach about ABC of CBT, teach challenging negative self talk, teach communication skills for social-emotional support, teach about empathy  Motivational Interviewing: assess and address ambivalence towards change and readiness and willingness to change, evoke change talk, challenge sustain talk, point out discrepancies, explore ambivalence towards change and road blocks, teach about OARS for interpersonal effectiveness  DBT: teach emotion identification, reinforce core mindfulness skills, teach TIPP skills for emotions regulation, teach scaling questions for emotion identification, teach core mindfulness skills, teach about dialectical thinking          ASSESSMENT: Current Emotional / Mental Status (status of significant symptoms):   Risk status (Self / Other harm or suicidal ideation)  Client denies current fears or concerns for personal safety.  Client denies current or recent suicidal ideation or behaviors. Reports passive, fleeting thoughts about wanting to escape and not have to deal with things a couple weeks ago, but denies active plans or desire to end his life.   Client denies current or recent homicidal ideation or behaviors.  Client denies current or recent self injurious behavior or ideation.  Client denies other safety concerns.  Client reports the following protective factors: positive relationships positive family connections, restricted access to lethal means, dedication to family/friends, safe and stable environment, regular physical activity, adherence with prescribed medication, living with other people, daily obligations, structured day and effective problem-solving skills.    Patient reports there has been no change in risk factors since their last session.     Patient reports there has been no change in protective factors since their last session.     Recommended that patient call 911 or go to the local ED should there be a change in any of these risk  "factors.     Appearance:   Appropriate    Eye Contact:   Fair    Psychomotor Behavior: Normal    Attitude:   Cooperative    Orientation:   All   Speech    Rate / Production: Normal     Volume:  Normal    Mood:    \"Better\"    Affect:    Appropriate  Bright at times    Thought Content:  Clear    Thought Form:  Coherent  Goal Directed  Logical    Insight:    Good      Medication Review:   See Epic for updates     Medication Compliance:   Yes     Changes in Health Issues:   None reported     Chemical Use Review:   Substance Use: Problem use continues with no change since last session, Patient declined discussion at this time        Tobacco Use: No current tobacco use      Diagnosis:   296.32 (F33.1) Major Depressive Disorder, Recurrent Episode, Moderate & 300.02 (F41.1) Generalized Anxiety Disorder     Collateral Reports Completed:   Not Applicable      PLAN: (Client Tasks / Therapist Tasks / Other)  Therapist will assign homework of skill practice; provide educational materials on distraction, relaxation, and assertiveness skills; role-play conflict management; teach emotional recognition/identification. Follow up on EMDR and [facility] referral.    By next appt, client will ask family and friends for feedback to more realistic, congruent self image.        [facility]                                                         ______________________________________________________________________    Treatment Plan    Client's Name: Adam Fish  YOB: 1990    Date: 11/14/2019    Diagnoses: 296.32 (F33.1) Major Depressive Disorder, Recurrent Episode, Moderate & 300.02 (F41.1) Generalized Anxiety Disorder & Alcohol Use Disorder, Mild; RULE .23 (F40.10) Social Anxiety Disorder  Psychosocial & Contextual Factors: Lack of reward at work, limited social support re: mental/emotional needs  WHODAS: 24    Referral / Collaboration:  Referral to another professional/service is not indicated at this " "time.    Anticipated number of session or this episode of care: 3-4 months      MeasurableTreatment Goal(s) related to diagnosis / functional impairment(s)  Goal 1: Client will improve mood and alcohol related issues as evidenced by decreased score on PHQ 9 from 15 (moderately severe) to 10 or less (moderate-mild).    I will know I've met my goal when I feel more comfortable and not doubt myself.      Objective #A (Client Action)    Client will decrease frequency and intensity of feeling down, depressed, hopeless as evidenced by checking perspectives, not comparing, and developing self awareness to \"figure out why I feel the way I feel and to be more comfortable in my own skin\".  Status: Continued - Date: 11/14/2019    Intervention(s)  Therapist will assign homework of cognitive, mood, and behavioral tracking/journaling; provide educational materials on cognitive distortions; role-play conflict management; teach the client how to perform a behavioral chain analysis.    Goal 2: Client will better manage anxiety as evidenced by decreased score on JELANI 7 from 15 (severe) to 10 or less (moderate).    I will know I've met my goal when I do not avoid people, stop over thinking, and speak up more.      Objective #A (Client Action)    Client will learn 3 anxiety management and assertiveness skills to \"get through daily interactions confidently and comfortably, with less anxiety\" - listening, talking, open dialogue, nasima with family.  Status: Continued - Date: 11/14/2019, active listening is helping      Intervention(s)  Therapist will assign homework of skill practice; provide educational materials on distraction, relaxation, and assertiveness skills; role-play conflict management; teach emotional recognition/identification.      Patient has reviewed and agreed to the above plan.      IOANA Pearson  11/14/2019  "

## 2019-12-20 ASSESSMENT — ANXIETY QUESTIONNAIRES: GAD7 TOTAL SCORE: 8

## 2019-12-21 DIAGNOSIS — F41.8 DEPRESSION WITH ANXIETY: ICD-10-CM

## 2019-12-23 RX ORDER — TRAZODONE HYDROCHLORIDE 50 MG/1
TABLET, FILM COATED ORAL
Start: 2019-12-23

## 2019-12-23 NOTE — TELEPHONE ENCOUNTER
Trazodone 50MG       Last Written Prescription Date:  2019  Last Fill Quantity: 270,   # refills: 0  Last Office Visit: 2018  Future Office visit:    Next 5 appointments (look out 90 days)    Dec 27, 2019 10:00 AM CST  Reggiehart Short with Lulu Cope MD  [facility] ([facility] [address] [address]  [phone]   2020  2:15 PM CST  Return Visit with Waleska Azevedo MD  [facility] ([facility]) [address] [address]  [phone]   2020 10:00 AM CST  Return Visit with Lexi Han [facility] ([facility]) [place] [id number]  [facility] [address]  [phone]   2020 10:00 AM CST  Return Visit with Lexi Han [facility] ([facility]) [facility]  [address]  [facility] [address]  [phone]   2020 10:00 AM CST  Return Visit with Lexi Han [facility] ([facility]) [facility]  [address]  [facility] [address]  [phone]           Routing refill request to provider for review/approval because:  Drug not active on patient's medication list    Requested Prescriptions   Pending Prescriptions Disp Refills     traZODone (DESYREL) 50 MG tablet [Pharmacy Med Name: TRAZODONE 50 MG TABLET] 270 tablet 0     Si-3 AT AT BEDTIME AS NEEDED SLEEP       Serotonin Modulators Failed - 2019 11:27 AM        Failed - Medication is active on med list        Passed - Recent (12 mo) or future (30 days) visit within the authorizing provider's specialty     Patient has had an office visit with the authorizing provider or a provider within the authorizing providers department within the previous 12 mos or has a future within  "next 30 days. See \"Patient Info\" tab in inbasket, or \"Choose Columns\" in Meds & Orders section of the refill encounter.              Passed - Patient is age 18 or older          "

## 2019-12-27 ENCOUNTER — OFFICE VISIT (OUTPATIENT)
Dept: FAMILY MEDICINE | Facility: CLINIC | Age: 29
End: 2019-12-27
Payer: COMMERCIAL

## 2019-12-27 ENCOUNTER — ANCILLARY PROCEDURE (OUTPATIENT)
Dept: GENERAL RADIOLOGY | Facility: CLINIC | Age: 29
End: 2019-12-27
Attending: FAMILY MEDICINE
Payer: COMMERCIAL

## 2019-12-27 VITALS
HEART RATE: 60 BPM | HEIGHT: 72 IN | TEMPERATURE: 98 F | BODY MASS INDEX: 28.58 KG/M2 | DIASTOLIC BLOOD PRESSURE: 66 MMHG | OXYGEN SATURATION: 96 % | SYSTOLIC BLOOD PRESSURE: 128 MMHG | WEIGHT: 211 LBS

## 2019-12-27 DIAGNOSIS — M79.644 PAIN OF FINGER OF RIGHT HAND: ICD-10-CM

## 2019-12-27 DIAGNOSIS — M79.644 PAIN OF FINGER OF RIGHT HAND: Primary | ICD-10-CM

## 2019-12-27 PROCEDURE — 99213 OFFICE O/P EST LOW 20 MIN: CPT | Performed by: FAMILY MEDICINE

## 2019-12-27 PROCEDURE — 73140 X-RAY EXAM OF FINGER(S): CPT | Mod: RT

## 2019-12-27 ASSESSMENT — MIFFLIN-ST. JEOR: SCORE: 1955.09

## 2019-12-27 NOTE — PROGRESS NOTES
Subjective     Adam Fish is a 29 year old male who presents to clinic today for the following health issues:    HPI   Musculoskeletal problem/pain      Duration: 1 month     Description  Location: right index finger    Intensity:  moderate    Accompanying signs and symptoms: swelling    History  Previous similar problem: no   Previous evaluation:  none    Precipitating or alleviating factors:  Trauma or overuse: YES- Hurt finger while playing basketball a month ago  Aggravating factors include: overuse    Therapies tried and outcome: nothing    He denies any swelling.   Denies any numbness or tingling.   Range of motion is intact.     Reviewed and updated as needed this visit by Provider         Review of Systems   ROS COMP: Constitutional, HEENT, cardiovascular, pulmonary, gi and gu systems are negative, except as otherwise noted.      Objective    /66   Pulse 60   Temp 98  F (36.7  C) (Oral)   Ht 1.829 m (6')   Wt 95.7 kg (211 lb)   SpO2 96%   BMI 28.62 kg/m    Body mass index is 28.62 kg/m .  Physical Exam   GENERAL: healthy, alert and no distress  NECK: no adenopathy, no asymmetry, masses, or scars and thyroid normal to palpation  RESP: lungs clear to auscultation - no rales, rhonchi or wheezes  CV: regular rate and rhythm, normal S1 S2, no S3 or S4, no murmur, click or rub, no peripheral edema and peripheral pulses strong  ABDOMEN: soft, nontender, no hepatosplenomegaly, no masses and bowel sounds normal  MS: no gross musculoskeletal defects noted, no edema    Diagnostic Test Results:  Labs reviewed in Epic        Assessment & Plan       ICD-10-CM    1. Pain of finger of right hand M79.644 [facility]     XR Finger Right G/E 2 Views     Examination today does not show any signs of fractures.  The patient was advised to proceed with OT for the right hand.  Referral placed and discussed with the patient.      Return in about 6 weeks (around 2/7/2020).    Lulu DASH  MD [place]  [facility]

## 2020-01-02 ENCOUNTER — OFFICE VISIT (OUTPATIENT)
Dept: PSYCHOLOGY | Facility: CLINIC | Age: 30
End: 2020-01-02
Payer: COMMERCIAL

## 2020-01-02 VITALS
HEART RATE: 79 BPM | OXYGEN SATURATION: 97 % | BODY MASS INDEX: 28.21 KG/M2 | SYSTOLIC BLOOD PRESSURE: 125 MMHG | DIASTOLIC BLOOD PRESSURE: 81 MMHG | WEIGHT: 208 LBS | TEMPERATURE: 98.2 F

## 2020-01-02 DIAGNOSIS — F34.1 DYSTHYMIA: Primary | ICD-10-CM

## 2020-01-02 DIAGNOSIS — F41.0 ANXIETY ATTACK: ICD-10-CM

## 2020-01-02 DIAGNOSIS — F40.10 SOCIAL ANXIETY DISORDER: ICD-10-CM

## 2020-01-02 PROCEDURE — 99214 OFFICE O/P EST MOD 30 MIN: CPT | Performed by: PSYCHIATRY & NEUROLOGY

## 2020-01-02 RX ORDER — BUSPIRONE HYDROCHLORIDE 30 MG/1
30 TABLET ORAL 2 TIMES DAILY
Qty: 60 TABLET | Refills: 1 | Status: SHIPPED | OUTPATIENT
Start: 2020-01-02 | End: 2020-02-20

## 2020-01-02 RX ORDER — TRAZODONE HYDROCHLORIDE 50 MG/1
50 TABLET, FILM COATED ORAL AT BEDTIME
Qty: 90 TABLET | Refills: 1 | Status: SHIPPED | OUTPATIENT
Start: 2020-01-02 | End: 2020-02-20

## 2020-01-02 RX ORDER — LORAZEPAM 0.5 MG/1
0.5 TABLET ORAL DAILY PRN
Qty: 30 TABLET | Refills: 0 | Status: SHIPPED | OUTPATIENT
Start: 2020-01-02 | End: 2020-02-20

## 2020-01-02 RX ORDER — BUPROPION HYDROCHLORIDE 300 MG/1
300 TABLET ORAL EVERY MORNING
Qty: 90 TABLET | Refills: 1 | Status: SHIPPED | OUTPATIENT
Start: 2020-01-02 | End: 2020-05-21

## 2020-01-02 ASSESSMENT — ANXIETY QUESTIONNAIRES
5. BEING SO RESTLESS THAT IT IS HARD TO SIT STILL: SEVERAL DAYS
2. NOT BEING ABLE TO STOP OR CONTROL WORRYING: SEVERAL DAYS
7. FEELING AFRAID AS IF SOMETHING AWFUL MIGHT HAPPEN: SEVERAL DAYS
GAD7 TOTAL SCORE: 10
IF YOU CHECKED OFF ANY PROBLEMS ON THIS QUESTIONNAIRE, HOW DIFFICULT HAVE THESE PROBLEMS MADE IT FOR YOU TO DO YOUR WORK, TAKE CARE OF THINGS AT HOME, OR GET ALONG WITH OTHER PEOPLE: SOMEWHAT DIFFICULT
6. BECOMING EASILY ANNOYED OR IRRITABLE: SEVERAL DAYS
1. FEELING NERVOUS, ANXIOUS, OR ON EDGE: MORE THAN HALF THE DAYS
3. WORRYING TOO MUCH ABOUT DIFFERENT THINGS: MORE THAN HALF THE DAYS

## 2020-01-02 NOTE — PROGRESS NOTES
"    Outpatient Psychiatric Progress Note    Name: Adam Fish   : 1990                    Primary Care Provider: Lulu Cope MD   Therapist: Héctor Pearson and a private practitioner for EMDR    PHQ-9 scores:  PHQ-9 SCORE 2019   PHQ-9 Total Score - - -   PHQ-9 Total Score 9 8 9       JELANI-7 scores:  JELANI-7 SCORE 2019   Total Score - - -   Total Score 11 8 10       Patient Identification:  Adam is a 29 year old year old, partnered / significant other  White American male  who presents for return visit with me.  Patient attended the session alone.     Interim History:  Kapil  reports that he is feeling more hopeful, more in control of his symptoms, and that he is \"attacking his illness on many different fronts.\"  He has been much more aware of his drinking, and is planning to go without alcohol for the month of January to see how it affects him.  We reviewed symptoms of alcohol withdrawal.  He will also take a multivitamin.    He has been in therapy since May, and recently started seeing an additional therapist for E MDR.  She has a very full schedule, so his second session will be until May.  He has been working on mindfulness and relaxation techniques with his current therapist.    He continues to have difficulty with sleep,  having up to 40 minutes or so of initial insomnia.  He also wakes up most nights between 2 and 3 AM and has difficulty returning to sleep.  We discussed several options for improving his sleep.  He has started taking trazodone again, and finds that if he takes it around 9: 30 or 10 PM and has a full 8 hours before he needs to be awake, it is helpful and does not cause daytime sedation.    He reports difficulty with sleep and feeling bad about himself as his main symptoms.  He continues to have decreased interest, feels depressed, has low energy, has difficulty concentrating in addition.    He feels nervous and worries " about a lot of different things.  He has difficulty relaxing.  He is particularly anxious in social situations, but has been practicing being more present and listening attentively rather than worrying about what he might say.    We agreed to increase his buspirone to 15 mg in the morning and 30 mg at bedtime for 1 week then increase to 30 mg twice a day.  He does feel that it has had some positive effect, and denies any adverse side effects.    Vital Signs:   /81 (BP Location: Right arm, Patient Position: Chair, Cuff Size: Adult Regular)   Pulse 79   Temp 98.2  F (36.8  C) (Oral)   Wt 94.3 kg (208 lb)   SpO2 97%   BMI 28.21 kg/m      Current Medications:  Current Outpatient Medications   Medication     buPROPion (WELLBUTRIN XL) 300 MG 24 hr tablet     busPIRone HCl (BUSPAR) 30 MG tablet     FLUoxetine (PROZAC) 40 MG capsule     gabapentin (NEURONTIN) 300 MG capsule     LORazepam (ATIVAN) 0.5 MG tablet     traZODone (DESYREL) 50 MG tablet     No current facility-administered medications for this visit.       Mental Status Examination:  Adam is a 29-year-old man who appears his stated age and in no acute distress.  He is neatly groomed in casual clothing.  Speech is clear and normal in rate and tone.  Eye contact is good.  Motor behavior is appropriate.  Affect is slightly blunted.  Mood is mildly depressed and anxious.  Thoughts are logical and spontaneous with no loose associations or flight of ideas.  Thought content shows no psychosis.  No suicidal thoughts.  He is alert and oriented x3.    Assessment and Plan:    ICD-10-CM    1. Dysthymia F34.1    2. Anxiety attack F41.0 traZODone (DESYREL) 50 MG tablet     LORazepam (ATIVAN) 0.5 MG tablet     busPIRone HCl (BUSPAR) 30 MG tablet   3. Depression with anxiety F41.8 buPROPion (WELLBUTRIN XL) 300 MG 24 hr tablet       Medical comorbidities include:   Patient Active Problem List   Diagnosis     CARDIOVASCULAR SCREENING; LDL GOAL LESS THAN 160      Impingement syndrome, shoulder     Depression with anxiety     Elevated BP without diagnosis of hypertension     Chronic midline low back pain with right-sided sciatica     Dysthymia       Treatment Plan:  Patient Instructions   Increase buspirone to 15 mg in the morning and 30 mg in the evening for one week, then take 30 mg twice daily.    Continue all other medications per your primary care provider.    Schedule an appointment with me in 4 weeks or sooner as needed.  Call [facility] at [phone] to schedule or schedule at the .     [facility] Resources:      Go to the Emergency Department as needed or call after hours crisis line at [phone].      To schedule individual or family therapy, call [facility] at [phone].     Follow up with primary care provider as planned or sooner for acute medical concerns.    Call the psychiatric nurse line with medication questions or concerns at [phone].    [employer] may be used to communicate with your provider, but this is not intended to be used for emergencies.    Community Resources:      National Suicide Prevention Lifeline: 450.411.8701 (TTY: 686.413.1264). Call anytime for help.  (www.suicidepreventionlifeline.org)    National [place] on Mental Illness (www.rosalio.org): 592.626.6048 or 004-393-3770.     Mental Health Association (www.mentalhealth.org): 949.460.3648 or 256-249-3141.    [place] Crisis Text Line: Text [place] to 059090    Suicide LifeLine Chat: suicideBluelightAppline.org/chat    Administrative Billing:   I spent 30 minutes with this patient, greater than 50% in counseling regarding above diagnosis and treatment options.    Patient Status:  Patient will continue to be seen for ongoing consultation and stabilization.

## 2020-01-02 NOTE — PATIENT INSTRUCTIONS
Increase buspirone to 15 mg in the morning and 30 mg in the evening for one week, then take 30 mg twice daily.    Continue all other medications per your primary care provider.    Schedule an appointment with me in 4 weeks or sooner as needed.  Call [facility] at [phone] to schedule or schedule at the .     [facility] Resources:      Go to the Emergency Department as needed or call after hours crisis line at [phone].      To schedule individual or family therapy, call [facility] at [phone].     Follow up with primary care provider as planned or sooner for acute medical concerns.    Call the psychiatric nurse line with medication questions or concerns at [phone].    [employer] may be used to communicate with your provider, but this is not intended to be used for emergencies.    Community Resources:      National Suicide Prevention Lifeline: 472.305.3871 (TTY: 659.672.7597). Call anytime for help.  (www.suicidepreventionlifeline.org)    National [place] on Mental Illness (www.rosalio.org): 724.238.6431 or 304-768-0405.     Mental Health Association (www.mentalhealth.org): 174.264.4299 or 686-023-5926.    [place] Crisis Text Line: Text [place] to 698942    Suicide LifeLine Chat: suicidepreventionlifeline.org/chat

## 2020-01-03 ASSESSMENT — ANXIETY QUESTIONNAIRES: GAD7 TOTAL SCORE: 10

## 2020-01-06 ENCOUNTER — MYC MEDICAL ADVICE (OUTPATIENT)
Dept: NEUROSURGERY | Facility: CLINIC | Age: 30
End: 2020-01-06

## 2020-01-07 NOTE — TELEPHONE ENCOUNTER
Per Annita Spencer PA-C: can you guys reach out and if he doesn t want to obtain new MRI at this time - can either follow up with Dr. Snider or if he wants to try another injection, can try this as well. [employer] message sent to patient.

## 2020-01-08 ENCOUNTER — ANCILLARY PROCEDURE (OUTPATIENT)
Dept: GENERAL RADIOLOGY | Facility: CLINIC | Age: 30
End: 2020-01-08
Attending: FAMILY MEDICINE
Payer: COMMERCIAL

## 2020-01-08 ENCOUNTER — OFFICE VISIT (OUTPATIENT)
Dept: FAMILY MEDICINE | Facility: CLINIC | Age: 30
End: 2020-01-08
Payer: COMMERCIAL

## 2020-01-08 VITALS
DIASTOLIC BLOOD PRESSURE: 79 MMHG | WEIGHT: 213 LBS | HEIGHT: 72 IN | OXYGEN SATURATION: 96 % | HEART RATE: 60 BPM | TEMPERATURE: 98.5 F | SYSTOLIC BLOOD PRESSURE: 113 MMHG | BODY MASS INDEX: 28.85 KG/M2

## 2020-01-08 DIAGNOSIS — M25.531 RIGHT WRIST PAIN: Primary | ICD-10-CM

## 2020-01-08 PROCEDURE — 99214 OFFICE O/P EST MOD 30 MIN: CPT | Performed by: FAMILY MEDICINE

## 2020-01-08 PROCEDURE — 73110 X-RAY EXAM OF WRIST: CPT | Mod: RT

## 2020-01-08 ASSESSMENT — MIFFLIN-ST. JEOR: SCORE: 1964.16

## 2020-01-08 NOTE — PROGRESS NOTES
Subjective     Adam Fish is a 29 year old male who presents to clinic today for the following health issues:    HPI   Musculoskeletal problem/pain      Duration: last night    Description  Location: right wrist    Intensity:  Pain can vary from dull to sharp if he moves it; or twists the wrist    Accompanying signs and symptoms: none    History  Previous similar problem: no   Previous evaluation:  none    Precipitating or alleviating factors:  Trauma or overuse: YES- fell last night on the hardwood floor  Aggravating factors include: none    Therapies tried and outcome: rest/inactivity and ice    PROBLEMS TO ADD ON...hx of right wrist fracture in 2000    BP Readings from Last 3 Encounters:   01/08/20 113/79   01/02/20 125/81   12/27/19 128/66    Wt Readings from Last 3 Encounters:   01/08/20 96.6 kg (213 lb)   01/02/20 94.3 kg (208 lb)   12/27/19 95.7 kg (211 lb)                    PROBLEMS TO ADD ON...  Reviewed and updated as needed this visit by Provider         Review of Systems   ROS COMP: Constitutional, HEENT, cardiovascular, pulmonary, GI, , musculoskeletal, neuro, skin, endocrine and psych systems are negative, except as otherwise noted.      Objective    /79   Pulse 60   Temp 98.5  F (36.9  C) (Oral)   Ht 1.829 m (6')   Wt 96.6 kg (213 lb)   SpO2 96%   BMI 28.89 kg/m    Body mass index is 28.89 kg/m .  Physical Exam   GENERAL: healthy, alert and no distress  Right wrist ulnar swelling- and limited ROM because f pain. Non tenderness over the metacarpal; intact sensation. Intact pulses. Rest of the wrist exam is normal.  RESP: lungs clear to auscultation - no rales, rhonchi or wheezes  CV: regular rate and rhythm, rest of the MS: no gross musculoskeletal defects noted, no edema    Diagnostic Test Results:  Labs reviewed in Epic  Results for orders placed or performed in visit on 01/08/20 (from the past 24 hour(s))   XR Wrist Right G/E 3 Views    Narrative    RIGHT WRIST THREE OR MORE  VIEWS   1/8/2020 8:34 AM     HISTORY:  Right wrist pain.    FINDINGS: There is an old ununited ulnar styloid fracture. There is an  old healed fracture deformity of the fifth metacarpal, and possibly  the fourth metacarpal.      Impression    IMPRESSION: No acute fracture identified.           Assessment & Plan     1. Right wrist pain    - XR Wrist Right G/E 3 Views- no fracture   -wear wrist brace for comfort.  -over the counter ibuprofen as needed   Warm or cold pack  Follow up in 2-4 weeks as needed        Return in about 2 weeks (around 1/22/2020) for concerns,unresolved.    Erinn Wellington MD  [facility]

## 2020-01-08 NOTE — PATIENT INSTRUCTIONS
-wear wrist brace for comfort.  -over the counter ibuprofen as needed   Warm or cold pack  Follow up in 2-4 weeks as needed

## 2020-01-09 ENCOUNTER — OFFICE VISIT (OUTPATIENT)
Dept: PSYCHOLOGY | Facility: CLINIC | Age: 30
End: 2020-01-09
Payer: COMMERCIAL

## 2020-01-09 DIAGNOSIS — F41.1 GENERALIZED ANXIETY DISORDER: Primary | ICD-10-CM

## 2020-01-09 DIAGNOSIS — F33.1 MAJOR DEPRESSIVE DISORDER, RECURRENT EPISODE, MODERATE (H): ICD-10-CM

## 2020-01-09 PROCEDURE — 90834 PSYTX W PT 45 MINUTES: CPT | Performed by: COUNSELOR

## 2020-01-09 ASSESSMENT — ANXIETY QUESTIONNAIRES
7. FEELING AFRAID AS IF SOMETHING AWFUL MIGHT HAPPEN: SEVERAL DAYS
GAD7 TOTAL SCORE: 9
6. BECOMING EASILY ANNOYED OR IRRITABLE: SEVERAL DAYS
1. FEELING NERVOUS, ANXIOUS, OR ON EDGE: MORE THAN HALF THE DAYS
IF YOU CHECKED OFF ANY PROBLEMS ON THIS QUESTIONNAIRE, HOW DIFFICULT HAVE THESE PROBLEMS MADE IT FOR YOU TO DO YOUR WORK, TAKE CARE OF THINGS AT HOME, OR GET ALONG WITH OTHER PEOPLE: SOMEWHAT DIFFICULT
5. BEING SO RESTLESS THAT IT IS HARD TO SIT STILL: SEVERAL DAYS
2. NOT BEING ABLE TO STOP OR CONTROL WORRYING: SEVERAL DAYS
3. WORRYING TOO MUCH ABOUT DIFFERENT THINGS: SEVERAL DAYS

## 2020-01-09 NOTE — PROGRESS NOTES
"                                           Progress Note    Client Name: Adam Fish  Date: 1/9/2020         Service Type: Individual  Video Visit: No     Session Start Time: 10:00a  Session End Time: 10:45a     Session Length: 45 minutes    Session #: 13    Attendees: Client attended alone     Treatment Plan Last Reviewed: 11/14/2019  PHQ-9 / JELANI-7: 10 & 9      DATA  Interactive Complexity: No  Crisis: No         Progress Since Last Session (Related to Symptoms / Goals / Homework):   Symptoms: Stable, worsened sleep - see Epic for PHQ 9 and JELANI 7 updates    Homework: Ongoing - ask family and friends for feedback to more realistic, congruent self image.      Episode of Care Goals: Satisfactory progress - ACTION (Actively working towards change); Intervened by reinforcing change plan / affirming steps taken     Current / Ongoing Stressors and Concerns:   Reported stable mood and anxiety; reported medication and skill practice have been helpful; some moments of social anxiety that feel hard to overcome which requires him to take lorazepam 2-3x week; reported feeling like sleep has been nasima difficult every night - waking up around 3-4a and cannot fall back asleep; practiced progressive muscle relaxation during session and connected with other relaxation, sleep strategies.      Treatment Objective(s) Addressed in This Session:    Client will decrease frequency and intensity of feeling down, depressed, hopeless as evidenced by checking perspectives, not comparing, and developing self awareness to \"figure out why I feel the way I feel and to be more comfortable in my own skin\". Client will learn 3 anxiety management and assertiveness skills to \"get through daily interactions confidently and comfortably, with less anxiety\" - listening, talking, open dialogue, nasima with family.     Intervention:  CBT: assess maladaptive patterns of behavior and cognition, teach about ABC of CBT, teach challenging negative self talk, " teach communication skills for social-emotional support, teach about empathy  Motivational Interviewing: assess and address ambivalence towards change and readiness and willingness to change, evoke change talk, challenge sustain talk, point out discrepancies, explore ambivalence towards change and road blocks, teach about OARS for interpersonal effectiveness  DBT: teach emotion identification, reinforce core mindfulness skills, teach TIPP skills for emotions regulation, teach scaling questions for emotion identification, teach core mindfulness skills, teach about dialectical thinking          ASSESSMENT: Current Emotional / Mental Status (status of significant symptoms):   Risk status (Self / Other harm or suicidal ideation)  Client denies current fears or concerns for personal safety.  Client denies current or recent suicidal ideation or behaviors. Reports passive, fleeting thoughts about wanting to escape and not have to deal with things a couple weeks ago, but denies active plans or desire to end his life.   Client denies current or recent homicidal ideation or behaviors.  Client denies current or recent self injurious behavior or ideation.  Client denies other safety concerns.  Client reports the following protective factors: positive relationships positive family connections, restricted access to lethal means, dedication to family/friends, safe and stable environment, regular physical activity, adherence with prescribed medication, living with other people, daily obligations, structured day and effective problem-solving skills.    Patient reports there has been no change in risk factors since their last session.     Patient reports there has been no change in protective factors since their last session.     Recommended that patient call 911 or go to the local ED should there be a change in any of these risk factors.     Appearance:   Appropriate    Eye Contact:   Fair    Psychomotor Behavior: Normal  "   Attitude:   Cooperative    Orientation:   All   Speech    Rate / Production: Normal     Volume:  Normal    Mood:    \"Better\" Some anxiousness   Affect:    Appropriate  Bright    Thought Content:  Clear    Thought Form:  Coherent  Goal Directed  Logical    Insight:    Good      Medication Review:   See Epic for updates     Medication Compliance:   Yes     Changes in Health Issues:   None reported     Chemical Use Review:   Substance Use: Problem use continues with no change since last session, Patient declined discussion at this time        Tobacco Use: No current tobacco use      Diagnosis:   296.32 (F33.1) Major Depressive Disorder, Recurrent Episode, Moderate & 300.02 (F41.1) Generalized Anxiety Disorder     Collateral Reports Completed:   Not Applicable      PLAN: (Client Tasks / Therapist Tasks / Other)  Therapist will assign homework of skill practice; provide educational materials on distraction, relaxation, and assertiveness skills; role-play conflict management; teach emotional recognition/identification.     By next appt, client will work to improve/sustain sleep (disrupted sleep, still getting ~6 hours of sleep though).        [facility]                                                         ______________________________________________________________________    Treatment Plan    Client's Name: Adam Fish  YOB: 1990    Date: 11/14/2019    Diagnoses: 296.32 (F33.1) Major Depressive Disorder, Recurrent Episode, Moderate & 300.02 (F41.1) Generalized Anxiety Disorder & Alcohol Use Disorder, Mild; RULE .23 (F40.10) Social Anxiety Disorder  Psychosocial & Contextual Factors: Lack of reward at work, limited social support re: mental/emotional needs  WHODAS: 24    Referral / Collaboration:  Referral to another professional/service is not indicated at this time.    Anticipated number of session or this episode of care: 3-4 months      MeasurableTreatment Goal(s) related to " "diagnosis / functional impairment(s)  Goal 1: Client will improve mood and alcohol related issues as evidenced by decreased score on PHQ 9 from 15 (moderately severe) to 10 or less (moderate-mild).    I will know I've met my goal when I feel more comfortable and not doubt myself.      Objective #A (Client Action)    Client will decrease frequency and intensity of feeling down, depressed, hopeless as evidenced by checking perspectives, not comparing, and developing self awareness to \"figure out why I feel the way I feel and to be more comfortable in my own skin\".  Status: Continued - Date: 11/14/2019    Intervention(s)  Therapist will assign homework of cognitive, mood, and behavioral tracking/journaling; provide educational materials on cognitive distortions; role-play conflict management; teach the client how to perform a behavioral chain analysis.    Goal 2: Client will better manage anxiety as evidenced by decreased score on JELANI 7 from 15 (severe) to 10 or less (moderate).    I will know I've met my goal when I do not avoid people, stop over thinking, and speak up more.      Objective #A (Client Action)    Client will learn 3 anxiety management and assertiveness skills to \"get through daily interactions confidently and comfortably, with less anxiety\" - listening, talking, open dialogue, nasima with family.  Status: Continued - Date: 11/14/2019, active listening is helping      Intervention(s)  Therapist will assign homework of skill practice; provide educational materials on distraction, relaxation, and assertiveness skills; role-play conflict management; teach emotional recognition/identification.      Patient has reviewed and agreed to the above plan.      IOANA Pearson  11/14/2019  "

## 2020-01-10 ASSESSMENT — ANXIETY QUESTIONNAIRES: GAD7 TOTAL SCORE: 9

## 2020-01-16 ENCOUNTER — THERAPY VISIT (OUTPATIENT)
Dept: OCCUPATIONAL THERAPY | Facility: CLINIC | Age: 30
End: 2020-01-16
Payer: COMMERCIAL

## 2020-01-16 ENCOUNTER — OFFICE VISIT (OUTPATIENT)
Dept: FAMILY MEDICINE | Facility: CLINIC | Age: 30
End: 2020-01-16
Payer: COMMERCIAL

## 2020-01-16 ENCOUNTER — ANCILLARY PROCEDURE (OUTPATIENT)
Dept: GENERAL RADIOLOGY | Facility: CLINIC | Age: 30
End: 2020-01-16
Attending: FAMILY MEDICINE
Payer: COMMERCIAL

## 2020-01-16 VITALS
HEART RATE: 74 BPM | TEMPERATURE: 99 F | SYSTOLIC BLOOD PRESSURE: 129 MMHG | WEIGHT: 213 LBS | BODY MASS INDEX: 28.85 KG/M2 | RESPIRATION RATE: 18 BRPM | HEIGHT: 72 IN | OXYGEN SATURATION: 97 % | DIASTOLIC BLOOD PRESSURE: 82 MMHG

## 2020-01-16 DIAGNOSIS — M79.644 PAIN OF FINGER OF RIGHT HAND: Primary | ICD-10-CM

## 2020-01-16 DIAGNOSIS — F51.01 PRIMARY INSOMNIA: ICD-10-CM

## 2020-01-16 DIAGNOSIS — S80.01XA CONTUSION OF RIGHT PATELLA, INITIAL ENCOUNTER: ICD-10-CM

## 2020-01-16 DIAGNOSIS — M25.531 RIGHT WRIST PAIN: ICD-10-CM

## 2020-01-16 DIAGNOSIS — S80.01XA CONTUSION OF RIGHT PATELLA, INITIAL ENCOUNTER: Primary | ICD-10-CM

## 2020-01-16 DIAGNOSIS — M54.41 CHRONIC BILATERAL LOW BACK PAIN WITH RIGHT-SIDED SCIATICA: ICD-10-CM

## 2020-01-16 DIAGNOSIS — G89.29 CHRONIC BILATERAL LOW BACK PAIN WITH RIGHT-SIDED SCIATICA: ICD-10-CM

## 2020-01-16 PROCEDURE — 97110 THERAPEUTIC EXERCISES: CPT | Mod: GO | Performed by: OCCUPATIONAL THERAPIST

## 2020-01-16 PROCEDURE — 97165 OT EVAL LOW COMPLEX 30 MIN: CPT | Mod: GO | Performed by: OCCUPATIONAL THERAPIST

## 2020-01-16 PROCEDURE — 99214 OFFICE O/P EST MOD 30 MIN: CPT | Performed by: FAMILY MEDICINE

## 2020-01-16 PROCEDURE — 73562 X-RAY EXAM OF KNEE 3: CPT | Mod: RT

## 2020-01-16 PROCEDURE — 97112 NEUROMUSCULAR REEDUCATION: CPT | Mod: GO | Performed by: OCCUPATIONAL THERAPIST

## 2020-01-16 RX ORDER — GABAPENTIN 300 MG/1
300 CAPSULE ORAL 3 TIMES DAILY PRN
Qty: 90 CAPSULE | Refills: 0 | Status: SHIPPED | OUTPATIENT
Start: 2020-01-16 | End: 2020-01-29

## 2020-01-16 ASSESSMENT — MIFFLIN-ST. JEOR: SCORE: 1964.16

## 2020-01-16 NOTE — PROGRESS NOTES
"Subjective     Adam Fish is a 29 year old male who presents to clinic today for the following health issues:    HPI   Insomnia  Onset: 3-4 weeks     Description:   Time to fall asleep (sleep latency): 15 minutes with medication   Middle of night awakening:  YES  Early morning awakening:  YES    Progression of Symptoms:  same    Accompanying Signs & Symptoms:  Daytime sleepiness/napping: no  Excessive snoring/apnea: no  Restless legs: no  Frequent urination: YES  Chronic pain:  no    History:  Prior Insomnia: no    Precipitating factors:   New stressful situation: no  Caffeine intake: no  OTC decongestants: no  Any new medications: no    Alleviating factors:  Self medicating (alcohol, etc.):  no    Therapies Tried and outcome: trazodone on and off , melatonin, help go to sleep but not thru the night       Joint Pain    Onset: on going     Description:   Location: right knee   Character: no pain feeling of \"chip bone\"    Intensity: moderate    Progression of Symptoms: same    Accompanying Signs & Symptoms:  Other symptoms: tingling    History:   Previous similar pain: YES      Precipitating factors:   Trauma or overuse: YES    Alleviating factors:  Improved by: nothing    Therapies Tried and outcome: none    Patient Active Problem List   Diagnosis     CARDIOVASCULAR SCREENING; LDL GOAL LESS THAN 160     Impingement syndrome, shoulder     Depression with anxiety     Elevated BP without diagnosis of hypertension     Chronic midline low back pain with right-sided sciatica     Dysthymia     Pain of finger of right hand     Right wrist pain     Past Surgical History:   Procedure Laterality Date     ARTHROSCOPY SHOULDER SUPERIOR LABRUM ANTERIOR TO POSTERIOR REPAIR         Social History     Tobacco Use     Smoking status: Never Smoker     Smokeless tobacco: Never Used   Substance Use Topics     Alcohol use: Yes     Alcohol/week: 10.0 standard drinks     Types: 10 Standard drinks or equivalent per week     Frequency: " 2-3 times a week     Drinks per session: 1 or 2     Binge frequency: Weekly     Comment: Weekly  has up to 7 drinks per occasion     Family History   Problem Relation Age of Onset     Depression Mother      Depression Brother      Breast Cancer Maternal Grandmother      Emphysema Maternal Grandfather      Dementia Paternal Grandmother      Dementia Paternal Grandfather            Reviewed and updated as needed this visit by Provider         Review of Systems   ROS COMP: Constitutional, HEENT, cardiovascular, pulmonary, gi and gu systems are negative, except as otherwise noted.      Objective    /82   Pulse 74   Temp 99  F (37.2  C) (Oral)   Resp 18   Ht 1.829 m (6')   Wt 96.6 kg (213 lb)   SpO2 97%   BMI 28.89 kg/m    Body mass index is 28.89 kg/m .  Physical Exam   GENERAL: healthy, alert and no distress  RESP: lungs clear to auscultation - no rales, rhonchi or wheezes  CV: regular rate and rhythm, normal S1 S2, no S3 or S4, no murmur, click or rub, no peripheral edema and peripheral pulses strong  MS: no gross musculoskeletal defects noted, no edema  PSYCH: mentation appears normal, affect normal/bright    Diagnostic Test Results:  Labs reviewed in Epic  No results found for any visits on 01/16/20.        Assessment & Plan     ICD-10-CM    1. Contusion of right patella, initial encounter S80.01XA XR Knee Right 3 Views     CANCELED: XR Knee Standing Right 2 Views     CANCELED: XR Knee Left 3 Views   2. Chronic bilateral low back pain with right-sided sciatica M54.41 gabapentin (NEURONTIN) 300 MG capsule    G89.29    3. Primary insomnia F51.01         X-ray of the right knee were reviewed. No abnormalities noted.     Advised to take Bendryl from OTC as needed. Continue with current dose of Trazodone.     See Patient Instructions    No follow-ups on file.    Lulu Cope MD  [facility]

## 2020-01-16 NOTE — PROGRESS NOTES
Hand Therapy Initial Evaluation    Current Date:  1/16/2020    Diagnosis:  Right  index finger injury/pain  DOI:  11/27/19  Referring MD: Erinn Wellington MD     Next MD visit: PRN      Subjective:    Adam Fish being seen for hand therapy.   Problem began 11/28/2019. Where condition occurred: during recreation / sport.Problem occurred: basetball injury  and reported as 2/10 on pain scale.  Pertinent medical history includes:  Depression.    Surgeries include:  Orthopedic surgery (R labral tear/repair).  Current medications:  Anti-depressants.   Primary job tasks include:  Computer work.  Pain is described as aching and is intermittent. Pain is the same all the time. Since onset symptoms are unchanged.   There was none improvement following previous treatment.   Patient is Energy : Works in the [employer]. Restrictions include:  Working in normal job without restrictions.    Barriers include:  None as reported by patient.  Red flags:  None as reported by patient.  Also notes some R wrist pain due to hittng the hand on the ground, on 1/7/20. Still feels stiff with motion and rotation      Occupational Profile Information:  Current occupation is Energy : Works in the [employer].  Currently working in normal job without restrictions  Job Tasks: Computer Work  Prior functional level:  no limitations  Barriers include:none  Mobility: No difficulty  Transportation: drives, bicycles and public transportation  Leisure activities/hobbies: basketball, DJ'ing, soccer plays goalie      Functional Outcome Measure:  1/16/2020  Upper Extremity Functional Index Score:  SCORE:   Column Totals: /80: (P) 66   (A lower score indicates greater disability.)    Objective:  Observation: Color/Temperature:     [x]    Normal  []    Abnormal  R ulnar wrist is more swollen over the Ulnar head, Pain with supination      Pain Level (Scale 0-10):   1/16/2020   At Rest  0   With Use 2     Pain Description:  Date 2020    right   Location  wrist and index finger   Pain Quality Wrist is Dull, Tender and the IF is  stiffness and sore   Frequency intermittent or daily     Pain is worst  daytime   Exacerbated by  use with heavier tasks   Relieved by cold and rest   Progression Gradually getting better.        ROM:    Wrist 2020   AROM(PROM) Right Left   Extension 60 72   Flexion 78 77   RD 28 35   UD 35 40   Supination 60+ 80-   Pronation 80+ 75     Date: 2020   index finger  AROM  (PROM) Right Left        MCP  0/90 0/85   PIP  0/100  0/105   DIP  0/65 0/70   TROM     DPC flex lag       Special Tests of the R wrist: No instability noted of the DRUJ, or at TFCC, no pain at Fovea.    Edema:  mild of the  index finger  Circumference: (Measured in cm)  2020    Location: Index    Right Left   P1 7.4 7.0   PIP 7.0 6.7   P2 6.0 6.0   DIP           Strength:STRENGTH:   (Measured in pounds)   2020      Trials Right Left Right Left   1  2  3  Av 115           Palpation:  []     Normal        [x]   Tender       []  Mild         []   Moderate []   Severe   Location: over the ulnar styloid of the right wrist       Sensation:  WNL throughout all nerve distributions; per patient report      Assessment:   Patient presents with symptoms consistent with above diagnosis,  with conservative intervention. Special testing during evaluation indicating some limitation in ROM of the wrist with pain at the end range, however no instability noted in all tests.    Patient's limitations or Problem List includes:  Pain, Decreased ROM/motion, Increased edema and Weakness of the right wrist and index finger and  which interferes with the patient's ability to perform self care: hygiene, Recreational Activities and Household Chores as compared to previous level of function.    Rehab Potential:  Excellent - Return to full activity, no limitations    Patient will  benefit from skilled Occupational Therapy to increase ROM,  strength, stability of wrist and stability of the index finger and decrease pain and edema to return to previous activity level and resume normal daily tasks and to reach their rehab potential.    Barriers to Learning:  No barrier    Communication Issues:  Patient appears to be able to clearly communicate and understand verbal and written communication and follow directions correctly.  Chart Review: Chart Review, Brief history including review of medical and/or therapy records relating to the presenting problem and Simple history review with patient    Identified Performance Deficits: hygiene and grooming, driving and community mobility, home establishment and management, meal preparation and cleanup, work, play and leisure activities    Assessment of Occupational Performance:  3-5 Performance Deficits    Clinical Decision Making (Complexity): Low complexity    Treatment Explanation:  The following has been discussed with the patient:  RX ordered/plan of care  Anticipated outcomes  Possible risks and side effects    Treatment Plan:   Frequency:  1 x visit  Duration:  NA; 1 x visit    Therapeutic Exercise:  Tendon Gliding  Neuromuscular re-education:  Stability taping  Self Care:  Ergonomic Considerations  Orthotic Fabrication: None, education in self athletic taping and ino taping    Discharge Plan:  Achieve all LTG  [place] in home treatment program.  Reach maximal therapeutic benefit.  Please refer to the daily flowsheet for treatment today and total treatment time.      Home Exercise Program:  Tape wrist and finger (ino taping) for participation in sport  Hook fist tendon gliding and marker roll

## 2020-01-16 NOTE — NURSING NOTE
Chief Complaint   Patient presents with     Sleep Apnea     Musculoskeletal Problem     /82   Pulse 74   Temp 99  F (37.2  C) (Oral)   Resp 18   Ht 1.829 m (6')   Wt 96.6 kg (213 lb)   SpO2 97%   BMI 28.89 kg/m   Estimated body mass index is 28.89 kg/m  as calculated from the following:    Height as of this encounter: 1.829 m (6').    Weight as of this encounter: 96.6 kg (213 lb).  bp completed using cuff size: regular       Health Maintenance addressed:  NONE    n/a    Princess Nunez, CMA, MA

## 2020-01-29 ENCOUNTER — MYC REFILL (OUTPATIENT)
Dept: FAMILY MEDICINE | Facility: CLINIC | Age: 30
End: 2020-01-29

## 2020-01-29 DIAGNOSIS — G89.29 CHRONIC BILATERAL LOW BACK PAIN WITH RIGHT-SIDED SCIATICA: ICD-10-CM

## 2020-01-29 DIAGNOSIS — M54.41 CHRONIC BILATERAL LOW BACK PAIN WITH RIGHT-SIDED SCIATICA: ICD-10-CM

## 2020-01-30 NOTE — TELEPHONE ENCOUNTER
Routing refill request to provider for review/approval because:  Drug not on the [facility] refill protocol   Mayela POLLARD RN    Last Written Prescription Date:  1/16/2020  Last Fill Quantity: 90,  # refills: 0   Last office visit: 1/16/2020 with prescribing provider:     Future Office Visit:   Next 5 appointments (look out 90 days)    Feb 06, 2020 10:00 AM CST  Return Visit with Lexi Han [facility] ([place] [address] [address] [id number] [phone]         Requested Prescriptions   Pending Prescriptions Disp Refills     gabapentin (NEURONTIN) 300 MG capsule 90 capsule 0     Sig: Take 1 capsule (300 mg) by mouth 3 times daily as needed (pain)       There is no refill protocol information for this order

## 2020-01-31 RX ORDER — GABAPENTIN 300 MG/1
300 CAPSULE ORAL 3 TIMES DAILY PRN
Qty: 90 CAPSULE | Refills: 0 | Status: SHIPPED | OUTPATIENT
Start: 2020-01-31 | End: 2020-03-19

## 2020-02-05 ENCOUNTER — MYC MEDICAL ADVICE (OUTPATIENT)
Dept: PSYCHOLOGY | Facility: CLINIC | Age: 30
End: 2020-02-05

## 2020-02-06 ENCOUNTER — OFFICE VISIT (OUTPATIENT)
Dept: PSYCHOLOGY | Facility: CLINIC | Age: 30
End: 2020-02-06
Payer: COMMERCIAL

## 2020-02-06 DIAGNOSIS — F41.1 GENERALIZED ANXIETY DISORDER: Primary | ICD-10-CM

## 2020-02-06 DIAGNOSIS — F33.1 MAJOR DEPRESSIVE DISORDER, RECURRENT EPISODE, MODERATE (H): ICD-10-CM

## 2020-02-06 PROCEDURE — 90834 PSYTX W PT 45 MINUTES: CPT | Performed by: COUNSELOR

## 2020-02-06 ASSESSMENT — ANXIETY QUESTIONNAIRES
IF YOU CHECKED OFF ANY PROBLEMS ON THIS QUESTIONNAIRE, HOW DIFFICULT HAVE THESE PROBLEMS MADE IT FOR YOU TO DO YOUR WORK, TAKE CARE OF THINGS AT HOME, OR GET ALONG WITH OTHER PEOPLE: SOMEWHAT DIFFICULT
1. FEELING NERVOUS, ANXIOUS, OR ON EDGE: SEVERAL DAYS
3. WORRYING TOO MUCH ABOUT DIFFERENT THINGS: SEVERAL DAYS
GAD7 TOTAL SCORE: 6
7. FEELING AFRAID AS IF SOMETHING AWFUL MIGHT HAPPEN: SEVERAL DAYS
5. BEING SO RESTLESS THAT IT IS HARD TO SIT STILL: NOT AT ALL
2. NOT BEING ABLE TO STOP OR CONTROL WORRYING: SEVERAL DAYS
6. BECOMING EASILY ANNOYED OR IRRITABLE: SEVERAL DAYS

## 2020-02-06 ASSESSMENT — PATIENT HEALTH QUESTIONNAIRE - PHQ9
SUM OF ALL RESPONSES TO PHQ QUESTIONS 1-9: 8
5. POOR APPETITE OR OVEREATING: SEVERAL DAYS

## 2020-02-06 NOTE — PROGRESS NOTES
"                                           Progress Note    Client Name: Adam Fish  Date: 2/6/2020         Service Type: Individual  Video Visit: No     Session Start Time: 10:00a  Session End Time: 10:45a     Session Length: 45 minutes    Session #: 14    Attendees: Client attended alone     Treatment Plan Last Reviewed: 11/14/2019  PHQ-9 / JELANI-7: 8 & 6      DATA  Interactive Complexity: No  Crisis: No         Progress Since Last Session (Related to Symptoms / Goals / Homework):   Symptoms: Stable - see Epic for PHQ 9 and JELANI 7 updates    Homework: Ongoing - work to improve/sustain sleep (disrupted sleep, still getting ~6 hours of sleep though).      Episode of Care Goals: Satisfactory progress - ACTION (Actively working towards change); Intervened by reinforcing change plan / affirming steps taken     Current / Ongoing Stressors and Concerns:   Ongoing work to manage sleep, reported working to be more active and seeing some improvements as a result; feels anxiety is better managed, but depression persists, difficulty sustaining neutral or positive mood, ongoing existential concerns, future goals, direction in career, trying to peak interest with community ed classes and volunteer work; ongoing strain communicating with brother.      Treatment Objective(s) Addressed in This Session:    Client will decrease frequency and intensity of feeling down, depressed, hopeless as evidenced by checking perspectives, not comparing, and developing self awareness to \"figure out why I feel the way I feel and to be more comfortable in my own skin\". Client will learn 3 anxiety management and assertiveness skills to \"get through daily interactions confidently and comfortably, with less anxiety\" - listening, talking, open dialogue, nasima with family.     Intervention:  CBT: assess maladaptive patterns of behavior and cognition, teach about ABC of CBT, teach challenging negative self talk, teach communication skills for " social-emotional support, teach about empathy  Motivational Interviewing: assess and address ambivalence towards change and readiness and willingness to change, evoke change talk, challenge sustain talk, point out discrepancies, explore ambivalence towards change and road blocks, teach about OARS for interpersonal effectiveness  DBT: teach emotion identification, reinforce core mindfulness skills, teach TIPP skills for emotions regulation, teach scaling questions for emotion identification, teach core mindfulness skills, teach about dialectical thinking          ASSESSMENT: Current Emotional / Mental Status (status of significant symptoms):   Risk status (Self / Other harm or suicidal ideation)  Client denies current fears or concerns for personal safety.  Client denies current or recent suicidal ideation or behaviors. Reports passive, fleeting thoughts about wanting to escape and not have to deal with things a couple weeks ago, but denies active plans or desire to end his life.   Client denies current or recent homicidal ideation or behaviors.  Client denies current or recent self injurious behavior or ideation.  Client denies other safety concerns.  Client reports the following protective factors: positive relationships positive family connections, restricted access to lethal means, dedication to family/friends, safe and stable environment, regular physical activity, adherence with prescribed medication, living with other people, daily obligations, structured day and effective problem-solving skills.    Patient reports there has been no change in risk factors since their last session.     Patient reports there has been no change in protective factors since their last session.     Recommended that patient call 911 or go to the local ED should there be a change in any of these risk factors.     Appearance:   Appropriate    Eye Contact:   Fair    Psychomotor Behavior: Normal    Attitude:   Cooperative     Orientation:   All   Speech    Rate / Production: Normal     Volume:  Normal    Mood:    Depressed Some anxiousness   Affect:    Appropriate  Mood congruent     Thought Content:  Clear    Thought Form:  Coherent  Goal Directed  Logical    Insight:    Good      Medication Review:   See Epic for updates     Medication Compliance:   Yes     Changes in Health Issues:   None reported     Chemical Use Review:   Substance Use: Problem use continues with no change since last session, Patient declined discussion at this time        Tobacco Use: No current tobacco use      Diagnosis:   296.32 (F33.1) Major Depressive Disorder, Recurrent Episode, Moderate & 300.02 (F41.1) Generalized Anxiety Disorder     Collateral Reports Completed:   Not Applicable      PLAN: (Client Tasks / Therapist Tasks / Other)  Therapist will assign homework of skill practice; provide educational materials on distraction, relaxation, and assertiveness skills; role-play conflict management; teach emotional recognition/identification.     By next appt, client will communicate directly with brother.        Lexi Han [facility]                                                         ______________________________________________________________________    Treatment Plan    Client's Name: Adam Fish  YOB: 1990    Date: 11/14/2019    Diagnoses: 296.32 (F33.1) Major Depressive Disorder, Recurrent Episode, Moderate & 300.02 (F41.1) Generalized Anxiety Disorder & Alcohol Use Disorder, Mild; RULE .23 (F40.10) Social Anxiety Disorder  Psychosocial & Contextual Factors: Lack of reward at work, limited social support re: mental/emotional needs  WHODAS: 24    Referral / Collaboration:  Referral to another professional/service is not indicated at this time.    Anticipated number of session or this episode of care: 3-4 months      MeasurableTreatment Goal(s) related to diagnosis / functional impairment(s)  Goal 1: Client will improve mood  "and alcohol related issues as evidenced by decreased score on PHQ 9 from 15 (moderately severe) to 10 or less (moderate-mild).    I will know I've met my goal when I feel more comfortable and not doubt myself.      Objective #A (Client Action)    Client will decrease frequency and intensity of feeling down, depressed, hopeless as evidenced by checking perspectives, not comparing, and developing self awareness to \"figure out why I feel the way I feel and to be more comfortable in my own skin\".  Status: Continued - Date: 11/14/2019    Intervention(s)  Therapist will assign homework of cognitive, mood, and behavioral tracking/journaling; provide educational materials on cognitive distortions; role-play conflict management; teach the client how to perform a behavioral chain analysis.    Goal 2: Client will better manage anxiety as evidenced by decreased score on JELANI 7 from 15 (severe) to 10 or less (moderate).    I will know I've met my goal when I do not avoid people, stop over thinking, and speak up more.      Objective #A (Client Action)    Client will learn 3 anxiety management and assertiveness skills to \"get through daily interactions confidently and comfortably, with less anxiety\" - listening, talking, open dialogue, nasima with family.  Status: Continued - Date: 11/14/2019, active listening is helping      Intervention(s)  Therapist will assign homework of skill practice; provide educational materials on distraction, relaxation, and assertiveness skills; role-play conflict management; teach emotional recognition/identification.      Patient has reviewed and agreed to the above plan.      IOANA Pearson  11/14/2019  "

## 2020-02-07 ASSESSMENT — ANXIETY QUESTIONNAIRES: GAD7 TOTAL SCORE: 6

## 2020-02-14 ENCOUNTER — OFFICE VISIT (OUTPATIENT)
Dept: FAMILY MEDICINE | Facility: CLINIC | Age: 30
End: 2020-02-14
Payer: COMMERCIAL

## 2020-02-14 VITALS
BODY MASS INDEX: 28.58 KG/M2 | HEIGHT: 72 IN | HEART RATE: 56 BPM | TEMPERATURE: 98.6 F | SYSTOLIC BLOOD PRESSURE: 132 MMHG | RESPIRATION RATE: 12 BRPM | OXYGEN SATURATION: 98 % | DIASTOLIC BLOOD PRESSURE: 86 MMHG | WEIGHT: 211 LBS

## 2020-02-14 DIAGNOSIS — S49.91XA INJURY OF RIGHT SHOULDER, INITIAL ENCOUNTER: Primary | ICD-10-CM

## 2020-02-14 PROCEDURE — 99214 OFFICE O/P EST MOD 30 MIN: CPT | Performed by: FAMILY MEDICINE

## 2020-02-14 ASSESSMENT — MIFFLIN-ST. JEOR: SCORE: 1955.09

## 2020-02-14 NOTE — PROGRESS NOTES
Subjective     Adam Fish is a 29 year old male who presents to clinic today for the following health issues:    HPI   Musculoskeletal problem/pain      Duration: Snowboarding accident on 02/8/2020    Description  Location: Right shoulder    Intensity:  moderate    Accompanying signs and symptoms: weakness of right shoulder    History  Previous similar problem: YES- has hurt right shoulder in 2018  Previous evaluation:  x-ray and MRI    Precipitating or alleviating factors:  Trauma or overuse: YES- snowboarding accident  Aggravating factors include: sleeping on right shoulder. Allowing arm to hang.     Therapies tried and outcome: nothing  Had multiple falls on the     Hx of ligament repair (torn labrum), 08/2018.  Achy when he sleeps on it. 6/10.  Rest makes it better.  Has not taken any medications yet.   Slightly better since the 8th.     Patient Active Problem List   Diagnosis     CARDIOVASCULAR SCREENING; LDL GOAL LESS THAN 160     Impingement syndrome, shoulder     Depression with anxiety     Elevated BP without diagnosis of hypertension     Chronic right-sided low back pain with right-sided sciatica     Dysthymia     Pain of finger of right hand     Right wrist pain     Kyphosis (acquired) (postural)     Poor posture     Thoracic segment dysfunction     Sacral pain     Somatic dysfunction of sacral region     Somatic dysfunction of lumbar region     Paresthesias     Past Surgical History:   Procedure Laterality Date     ARTHROSCOPY SHOULDER SUPERIOR LABRUM ANTERIOR TO POSTERIOR REPAIR         Social History     Tobacco Use     Smoking status: Never Smoker     Smokeless tobacco: Never Used   Substance Use Topics     Alcohol use: Yes     Alcohol/week: 10.0 standard drinks     Types: 10 Standard drinks or equivalent per week     Frequency: 2-3 times a week     Drinks per session: 1 or 2     Binge frequency: Weekly     Comment: Weekly  has up to 7 drinks per occasion     Family History   Problem Relation  Age of Onset     Depression Mother      Depression Brother      Breast Cancer Maternal Grandmother      Emphysema Maternal Grandfather      Dementia Paternal Grandmother      Dementia Paternal Grandfather            Reviewed and updated as needed this visit by Provider         Review of Systems   ROS COMP: Constitutional, HEENT, cardiovascular, pulmonary, gi and gu systems are negative, except as otherwise noted.      Objective    /86   Pulse 56   Temp 98.6  F (37  C) (Oral)   Resp 12   Ht 1.829 m (6')   Wt 95.7 kg (211 lb)   SpO2 98%   BMI 28.62 kg/m    Body mass index is 28.62 kg/m .  Physical Exam   GENERAL: healthy, alert and no distress  RESP: lungs clear to auscultation - no rales, rhonchi or wheezes  CV: regular rate and rhythm, normal S1 S2, no S3 or S4, no murmur, click or rub, no peripheral edema and peripheral pulses strong  MS: no gross musculoskeletal defects noted, no edema    Diagnostic Test Results:  Labs reviewed in Epic        Assessment & Plan       ICD-10-CM    1. Injury of right shoulder, initial encounter S49.91XA MR Shoulder Right w/o Contrast     Patient was advised to proceed with physical therapy first and if symptoms fails to improve proceed with the MRI.     Return in about 1 month (around 3/14/2020) for Follow-up visit.    Lulu Cope MD  [facility]

## 2020-02-14 NOTE — PATIENT INSTRUCTIONS
You can call to schedule radiology tests at the following numbers:    [facility] (including mammograms, ultrasound, CT and MRI scans and Dexa Scans)    Call   Toll Free     [facility] (including mammograms, ultrasound, CT and MRI scans and Dexa Scans)    Call   Toll Free

## 2020-02-17 ENCOUNTER — THERAPY VISIT (OUTPATIENT)
Dept: CHIROPRACTIC MEDICINE | Facility: CLINIC | Age: 30
End: 2020-02-17
Payer: COMMERCIAL

## 2020-02-17 DIAGNOSIS — M54.41 CHRONIC RIGHT-SIDED LOW BACK PAIN WITH RIGHT-SIDED SCIATICA: ICD-10-CM

## 2020-02-17 DIAGNOSIS — R29.3 POOR POSTURE: ICD-10-CM

## 2020-02-17 DIAGNOSIS — M99.03 SOMATIC DYSFUNCTION OF LUMBAR REGION: Primary | ICD-10-CM

## 2020-02-17 DIAGNOSIS — M99.02 THORACIC SEGMENT DYSFUNCTION: ICD-10-CM

## 2020-02-17 DIAGNOSIS — M99.04 SOMATIC DYSFUNCTION OF SACRAL REGION: ICD-10-CM

## 2020-02-17 DIAGNOSIS — M40.00 KYPHOSIS (ACQUIRED) (POSTURAL): ICD-10-CM

## 2020-02-17 DIAGNOSIS — M53.3 SACRAL PAIN: ICD-10-CM

## 2020-02-17 DIAGNOSIS — G89.29 CHRONIC RIGHT-SIDED LOW BACK PAIN WITH RIGHT-SIDED SCIATICA: ICD-10-CM

## 2020-02-17 PROCEDURE — 99202 OFFICE O/P NEW SF 15 MIN: CPT | Mod: 25 | Performed by: CHIROPRACTOR

## 2020-02-17 PROCEDURE — 97112 NEUROMUSCULAR REEDUCATION: CPT | Mod: 59 | Performed by: CHIROPRACTOR

## 2020-02-17 PROCEDURE — 98941 CHIROPRACT MANJ 3-4 REGIONS: CPT | Mod: AT | Performed by: CHIROPRACTOR

## 2020-02-20 ENCOUNTER — OFFICE VISIT (OUTPATIENT)
Dept: PSYCHOLOGY | Facility: CLINIC | Age: 30
End: 2020-02-20
Payer: COMMERCIAL

## 2020-02-20 VITALS
BODY MASS INDEX: 28.21 KG/M2 | SYSTOLIC BLOOD PRESSURE: 120 MMHG | TEMPERATURE: 97.5 F | HEART RATE: 61 BPM | DIASTOLIC BLOOD PRESSURE: 73 MMHG | WEIGHT: 208 LBS | OXYGEN SATURATION: 98 %

## 2020-02-20 DIAGNOSIS — F34.1 DYSTHYMIA: Primary | ICD-10-CM

## 2020-02-20 DIAGNOSIS — F41.1 GENERALIZED ANXIETY DISORDER: ICD-10-CM

## 2020-02-20 PROBLEM — M40.00 KYPHOSIS (ACQUIRED) (POSTURAL): Status: ACTIVE | Noted: 2020-02-20

## 2020-02-20 PROBLEM — G89.29 CHRONIC RIGHT-SIDED LOW BACK PAIN WITH RIGHT-SIDED SCIATICA: Status: ACTIVE | Noted: 2018-12-14

## 2020-02-20 PROBLEM — M53.3 SACRAL PAIN: Status: ACTIVE | Noted: 2020-02-20

## 2020-02-20 PROBLEM — M54.41 CHRONIC RIGHT-SIDED LOW BACK PAIN WITH RIGHT-SIDED SCIATICA: Status: ACTIVE | Noted: 2018-12-14

## 2020-02-20 PROBLEM — M99.02 THORACIC SEGMENT DYSFUNCTION: Status: ACTIVE | Noted: 2020-02-20

## 2020-02-20 PROBLEM — M99.04 SOMATIC DYSFUNCTION OF SACRAL REGION: Status: ACTIVE | Noted: 2020-02-20

## 2020-02-20 PROBLEM — M99.03 SOMATIC DYSFUNCTION OF LUMBAR REGION: Status: ACTIVE | Noted: 2020-02-20

## 2020-02-20 PROBLEM — R29.3 POOR POSTURE: Status: ACTIVE | Noted: 2020-02-20

## 2020-02-20 PROCEDURE — 99214 OFFICE O/P EST MOD 30 MIN: CPT | Performed by: PSYCHIATRY & NEUROLOGY

## 2020-02-20 RX ORDER — BUSPIRONE HYDROCHLORIDE 30 MG/1
30 TABLET ORAL 2 TIMES DAILY
Qty: 180 TABLET | Refills: 1 | Status: SHIPPED | OUTPATIENT
Start: 2020-02-20 | End: 2020-08-06

## 2020-02-20 RX ORDER — LORAZEPAM 0.5 MG/1
0.5 TABLET ORAL DAILY PRN
Qty: 30 TABLET | Refills: 0 | Status: SHIPPED | OUTPATIENT
Start: 2020-02-20 | End: 2020-04-07

## 2020-02-20 ASSESSMENT — ANXIETY QUESTIONNAIRES
3. WORRYING TOO MUCH ABOUT DIFFERENT THINGS: NOT AT ALL
2. NOT BEING ABLE TO STOP OR CONTROL WORRYING: SEVERAL DAYS
6. BECOMING EASILY ANNOYED OR IRRITABLE: SEVERAL DAYS
5. BEING SO RESTLESS THAT IT IS HARD TO SIT STILL: NOT AT ALL
1. FEELING NERVOUS, ANXIOUS, OR ON EDGE: SEVERAL DAYS
7. FEELING AFRAID AS IF SOMETHING AWFUL MIGHT HAPPEN: NOT AT ALL
IF YOU CHECKED OFF ANY PROBLEMS ON THIS QUESTIONNAIRE, HOW DIFFICULT HAVE THESE PROBLEMS MADE IT FOR YOU TO DO YOUR WORK, TAKE CARE OF THINGS AT HOME, OR GET ALONG WITH OTHER PEOPLE: SOMEWHAT DIFFICULT
GAD7 TOTAL SCORE: 4

## 2020-02-20 ASSESSMENT — PATIENT HEALTH QUESTIONNAIRE - PHQ9
5. POOR APPETITE OR OVEREATING: SEVERAL DAYS
SUM OF ALL RESPONSES TO PHQ QUESTIONS 1-9: 6

## 2020-02-20 ASSESSMENT — PAIN SCALES - GENERAL: PAINLEVEL: NO PAIN (0)

## 2020-02-20 NOTE — PATIENT INSTRUCTIONS
Increase fluoxetine to 60 mg daily.    Discontinue trazodone.    Continue all other medications per your primary care provider.    Schedule an appointment with me in 6 weeks or sooner as needed.  Call [facility] at [phone] to schedule or schedule at the .     [facility]:      Go to the Emergency Department as needed or call after hours crisis line at [phone].      To schedule individual or family therapy, call [facility] at [phone].     Follow up with primary care provider as planned or sooner for acute medical concerns.    Call the psychiatric nurse line with medication questions or concerns at [phone].    [facility] may be used to communicate with your provider, but this is not intended to be used for emergencies.    Community Resources:      National Suicide Prevention Lifeline: 275.600.3354 (TTY: 244.716.4610). Call anytime for help.  (www.suicidepreventionlifeline.org)    [employer] [place] [employer] Mental Illness (www.rosalio.org): 985.620.9614 or 111-490-2384.     Mental Health Association (www.mentalhealth.org): 598.195.2886 or 743-519-9999.    [place] Crisis Text Line: Text [place] to 954654    Suicide LifeLine Chat: suicidepreOurCrowdline.org/chat

## 2020-02-20 NOTE — PROGRESS NOTES
Outpatient Psychiatric Progress Note    Name: Adam Fish   : 1990                    Primary Care Provider: Lulu Cope MD   Therapist: Héctor Pearson and a private practitioner for EMDR     PHQ-9 scores:  PHQ-9 SCORE 2020   PHQ-9 Total Score - - -   PHQ-9 Total Score 9 10 8       JELANI-7 scores:  JELANI-7 SCORE 2020   Total Score - - -   Total Score 10 9 6       Patient Identification:  Adam is a 30 year old year old, in a relationship   White American male  who presents for return visit with me.  Patient attended the session alone.     Interim History:  Adam reports that he is doing better overall.  As his anxiety has reduced, he is more aware of his low mood.  He is wondering about increasing medication.  He was on Wellbutrin alone prior to adding fluoxetine, and really did not feel the Wellbutrin was doing a lot for him, so rather than increasing it we agreed to increase the fluoxetine.  He has felt the increase in buspirone has been beneficial for his anxiety.    He still endorses decreased interest and pleasure, feeling depressed, having difficulty falling asleep, having low energy, and feeling bad about himself.  He has trouble controlling his worry, trouble relaxing, and being irritable.  He actually feels as though he sleeps better if he does not use sleeping medication but just lets his body relax naturally.    He continues to do therapy with two different therapists.  He has also signed up for some classes through a program called [facility] in [place] that has a variety of classes available for mindfulness activities.    He says that he learned a lot about his socializing by not drinking for the month of January.  He does not intend to give up alcohol altogether, but thinks that he will probably cut back to only drinking about half as often as he did in the past and perhaps drinking fewer drinks.  He has realized that if he is  drinking less he can actually do more fun activities and feels like his weekends are longer.    Vital Signs:   /73 (BP Location: Right arm, Patient Position: Chair, Cuff Size: Adult Regular)   Pulse 61   Temp 97.5  F (36.4  C) (Oral)   Wt 94.3 kg (208 lb)   SpO2 98%   BMI 28.21 kg/m      Current Medications:  Current Outpatient Medications   Medication     buPROPion (WELLBUTRIN XL) 300 MG 24 hr tablet     busPIRone HCl 30 MG PO tablet     FLUoxetine (PROZAC) 40 MG capsule     FLUoxetine 20 MG PO capsule     gabapentin (NEURONTIN) 300 MG capsule     LORazepam 0.5 MG PO tablet     No current facility-administered medications for this visit.       Mental Status Examination:  Adam is a 30-year-old man who appears his stated age and in no acute distress.  He is neatly groomed in casual clothing.  He reports that he drank last night because it was his birthday, and does smell somewhat of alcohol this morning.  Speech is clear and normal in rate and tone.  Eye contact is good.  Motor behavior is appropriate.  Affect is slightly blunted.  Mood is fair.  Thoughts are logical and spontaneous with no loose associations or flight of ideas.  Thought content shows no psychosis.  No suicidal or homicidal thoughts.  He will is alert and oriented x3.    Assessment and Plan:    ICD-10-CM    1. Dysthymia F34.1 FLUoxetine 20 MG PO capsule   2. Generalized anxiety disorder F41.1 LORazepam 0.5 MG PO tablet     busPIRone HCl 30 MG PO tablet       Medical comorbidities include:   Patient Active Problem List   Diagnosis     CARDIOVASCULAR SCREENING; LDL GOAL LESS THAN 160     Impingement syndrome, shoulder     Depression with anxiety     Elevated BP without diagnosis of hypertension     Chronic midline low back pain with right-sided sciatica     Dysthymia     Pain of finger of right hand     Right wrist pain       Treatment Plan:  Patient Instructions   Increase fluoxetine to 60 mg daily.    Discontinue trazodone.    Continue  all other medications per your primary care provider.    Schedule an appointment with me in 6 weeks or sooner as needed.  Call [facility] at [phone] to schedule or schedule at the .     [facility]:      Go to the Emergency Department as needed or call after hours crisis line at [phone].      To schedule individual or family therapy, call [facility] at [phone].     Follow up with primary care provider as planned or sooner for acute medical concerns.    Call the psychiatric nurse line with medication questions or concerns at [phone].    [facility] may be used to communicate with your provider, but this is not intended to be used for emergencies.    Community Resources:      National Suicide Prevention Lifeline: 563.485.5660 (TTY: 775.426.8344). Call anytime for help.  (www.suicidepreventionlifeline.org)    National [place] on Mental Illness (www.rosalio.org): 644.786.4681 or 841-148-7087.     Mental Health Association (www.mentalhealth.org): 970.294.6894 or 551-374-5408.    [place] Crisis Text Line: Text [place] to 140927    Suicide LifeLine Chat: suicidepreInveniline.org/chat     Administrative Billing:   I spent approximately 30 minutes with this patient, greater than 50% in counseling regarding residual symptoms and medication options.    Patient Status:  Patient will continue to be seen for ongoing consultation and stabilization.

## 2020-02-20 NOTE — PROGRESS NOTES
Initial Chiropractic Clinic Visit    PCP: Lulu Cope Polina is a 30 year old male who is seen  in consultation at the request of  Lulu Cope M.D. presenting with chronic low back pain and R sciatic pain . Patient reports that the onset was 11/2018.  When asked, patient denies:, falling, slipping, bending and reaching or sleeping awkwardly. The pt states he was heavy lifting when the pain occurred. The pt reports 75% improvement in the low back after having physical therapy. He grades the px a 2-6/10 on VAS. Currently his pain level is a 4/10 on VAS. He notes low back pain with radiation in the R leg to the R knee. The pt denies weakness in the extremities or other unusual sx. Sitting will increase the px. Transitional movements will increase the px. The pt denies weakness or other unusual sx.  Prior to onset, the patient was able to sit for 8 hours. Patient notes that due to symptoms, they can only sit for a short period due to pain. Adam Harrisshaniqua notes   sitting rated at a 6/10 painful, difficult and prior to this incident it was 0/10.        Injury: There was no injury related to this episode     Location of Pain: right low back and R leg at the following level(s) T6 , T9 , L5 , Sacrum  and PSIS Right   Duration of Pain: 4-5 months   Rating of Pain at worst: 6/10  Rating of Pain Currently: 4/10  Symptoms are better with: PT   Symptoms are worse with:  sitting   Additional Features: paresthesias     Health History  as reported by the patient:    How does the patient rate their own health:   Good    Current or past medical history:   Depression    Medical allergies  None    Past Traumas/Surgeries  Orthopedic: R shoulder labrum repair    Family History  Family History   Problem Relation Age of Onset     Depression Mother      Depression Brother      Breast Cancer Maternal Grandmother      Emphysema Maternal Grandfather      Dementia Paternal Grandmother      Dementia Paternal  "Grandfather        Medications:  Anti-depressants    Occupation:      Primary job tasks:   Computer work and Prolonged sitting    Barriers as home/work:   none    Additional health Issues:     None             Adam was asked to complete the Oswestry Low Back Disability Index and Rodriguez Start Back screening tool, today in the office.  Disability score: 16%. Keel Start Total Score:4 Sub Score: 1     Review of Systems  Musculoskeletal: as above  Remainder of review of systems is negative including constitutional, CV, pulmonary, GI, Skin and Neurologic except as noted in HPI or medical history.    No past medical history on file.  Past Surgical History:   Procedure Laterality Date     ARTHROSCOPY SHOULDER SUPERIOR LABRUM ANTERIOR TO POSTERIOR REPAIR       Objective  There were no vitals taken for this visit.      GENERAL APPEARANCE: healthy, alert and no distress   GAIT: NORMAL  SKIN: no suspicious lesions or rashes  NEURO: Normal strength and tone, mentation intact and speech normal  PSYCH:  mentation appears normal and affect normal/bright    Low back exam:    Inspection:  \"     no visible deformity in the low back       normal skin\"  Slumped seated posture, anterior pelvic flexion with sacral/coccyx seated posture, Increased thoracic kyphosis with subsequent anterior cervical carriage. Poor seated posture      ROM:       limited flexion due to pain       limited extension due to pain    Tender:       paraspinal muscles       B QL, R gluteus medius, R latissimus, R hamstring     Non Tender:       remainder of lumbar spine    Strength:       hip flexion 5/5 Normal       knee extension 5/5 Normal       ankle dorsiflexion 5/5 Normal       ankle plantarflexion 5/5 Normal       dorsiflexion of the great toe 5/5 Normal    Reflexes:       patellar (L3, L4) 2 bilaterally       achilles tendons (S1) 2 bilaterally    Sensation:      grossly intact throughout lower extremities    Special tests:  Kemps - Right " positive, low back pain and Left negative, SLR - Right positive, low back pain, Gaenslen's - Right negative and Left negative and Fabere - Right positive, hip and low back pain and Left negative    Segmental spinal dysfunction/restrictions found at: T6 , T9 , L5 , Sacrum  and PSIS Right      The following soft tissue hypotonicities were observed:Gluteal: right, referred pain: no  Quad lumb: bilateral, referred pain: no  T paraspinals: ache and dull pain, no    Trigger points were found in: R gluteus medius     Muscle spasm found in:Lumbar erector spine and Quad lumb      Radiology:  None     Assessment:    1. Somatic dysfunction of lumbar region    2. Chronic right-sided low back pain with right-sided sciatica    3. Somatic dysfunction of sacral region    4. Sacral pain    5. Thoracic segment dysfunction    6. Poor posture    7. Kyphosis (acquired) (postural)        RX ordered/plan of care  Anticipated outcomes  Possible risks and side effects    After discussing the risk and benefits of care, patient consented to treatment    Prognosis: Good      Patient's condition:  Patient had restrictions pre-manipulation    Treatment effectiveness:  Post manipulation there is better intersegmental movement and Patient claims to feel looser post manipulation      Plan:    Procedures:  Evaluation and Management:  38125 Low to moderate level exam 20 min   T6 , T9 , L5 , Sacrum  and PSIS Right       CMT:  00424 Chiropractic manipulative treatment 3-4 regions performed   Thoracic: Diversified, T6, T9, Prone  Lumbar: Diversified, L5, Side posture  Pelvis: Drop Table, Sacrum , PSIS Right , Prone    Modalities:  None performed this visit    Therapeutic procedures:  88758: Neurological re-education/proprioception training and proper long term sitting posture: Corrected patient's seated posture when sitting for longer than 20 minutes or seated at the computer related to work duties for over 8 hours per day. Fit patient with Ramona  lumbar support for postural re-training with demonstration. Showed patient how to place the support correctly when seated and to increase usage by 2-3 hour increments per day until they are able to sit full time without spinal irritation. Related improper vs. proper sitting to optimal spinal biomechanics using the spine model with demonstration with the purpose of PREVENTING premature spinal degeneration from cumulative static motion. Demonstrated the increase in load and shearing forces on the spine in addition to the cumulative degenerative effects of axial compression on a spine that is chronically slumped and in an 'unlocked' position vs a 'locked' position. Demonstrated the use of a lap top table for lap top computers to prevent excessive cervical flexion of the neck. Demonstrated 'hip hinging' to access the computer when seated rather than thoracic flexion. Gave cervical retraction for proper cervical alignment, anterior deep cervical flexor strengthening and cervical proprioception training with demonstration. Per 10-12 minutes total        Response to Treatment  No increase in sx, pt stable       Treatment plan and goals:  Goals:  SITTING: the patient specific goal is to attain pre-injury status of  8 hours comfortably    Frequency of care  Duration of care is estimated to be 6-8 weeks, from the initial treatment.  It is estimated that the patient will need a total of 6-8 visits to resolve this episode.  For the initial therapeutic trial of care, the frequency is recommended at 1 X week, once daily.  A reevaluation would be clinically appropriate in 8 visits, to determine progress and further course of care.    In-Office Treatment  Evaluation  Spinal Chiropractic Manipulative Therapy  Postural correction        Recommendations:    Instructions:  Use lumbar support at all times     Follow-up:    Return to care in 1 week with ACP.       Discussed the assessment with the patient.      Disclaimer: This note  consists of symbols derived from keyboarding, dictation and/or voice recognition software. As a result, there may be errors in the script that have gone undetected. Please consider this when interpreting information found in this chart.

## 2020-02-21 ASSESSMENT — ANXIETY QUESTIONNAIRES: GAD7 TOTAL SCORE: 4

## 2020-02-24 ENCOUNTER — THERAPY VISIT (OUTPATIENT)
Dept: CHIROPRACTIC MEDICINE | Facility: CLINIC | Age: 30
End: 2020-02-24
Payer: COMMERCIAL

## 2020-02-24 ENCOUNTER — OFFICE VISIT (OUTPATIENT)
Dept: PSYCHOLOGY | Facility: CLINIC | Age: 30
End: 2020-02-24
Payer: COMMERCIAL

## 2020-02-24 DIAGNOSIS — G89.29 CHRONIC RIGHT-SIDED LOW BACK PAIN WITH RIGHT-SIDED SCIATICA: ICD-10-CM

## 2020-02-24 DIAGNOSIS — F41.1 GENERALIZED ANXIETY DISORDER: ICD-10-CM

## 2020-02-24 DIAGNOSIS — R20.2 PARESTHESIAS: ICD-10-CM

## 2020-02-24 DIAGNOSIS — M99.02 THORACIC SEGMENT DYSFUNCTION: ICD-10-CM

## 2020-02-24 DIAGNOSIS — M99.03 SOMATIC DYSFUNCTION OF LUMBAR REGION: Primary | ICD-10-CM

## 2020-02-24 DIAGNOSIS — M99.04 SOMATIC DYSFUNCTION OF SACRAL REGION: ICD-10-CM

## 2020-02-24 DIAGNOSIS — M54.41 CHRONIC RIGHT-SIDED LOW BACK PAIN WITH RIGHT-SIDED SCIATICA: ICD-10-CM

## 2020-02-24 DIAGNOSIS — F33.1 MAJOR DEPRESSIVE DISORDER, RECURRENT EPISODE, MODERATE (H): Primary | ICD-10-CM

## 2020-02-24 PROCEDURE — 90834 PSYTX W PT 45 MINUTES: CPT | Performed by: COUNSELOR

## 2020-02-24 PROCEDURE — 98941 CHIROPRACT MANJ 3-4 REGIONS: CPT | Mod: AT | Performed by: CHIROPRACTOR

## 2020-02-24 PROCEDURE — 97035 APP MDLTY 1+ULTRASOUND EA 15: CPT | Performed by: CHIROPRACTOR

## 2020-02-24 ASSESSMENT — ANXIETY QUESTIONNAIRES
GAD7 TOTAL SCORE: 6
7. FEELING AFRAID AS IF SOMETHING AWFUL MIGHT HAPPEN: NOT AT ALL
IF YOU CHECKED OFF ANY PROBLEMS ON THIS QUESTIONNAIRE, HOW DIFFICULT HAVE THESE PROBLEMS MADE IT FOR YOU TO DO YOUR WORK, TAKE CARE OF THINGS AT HOME, OR GET ALONG WITH OTHER PEOPLE: SOMEWHAT DIFFICULT
6. BECOMING EASILY ANNOYED OR IRRITABLE: SEVERAL DAYS
5. BEING SO RESTLESS THAT IT IS HARD TO SIT STILL: NOT AT ALL
1. FEELING NERVOUS, ANXIOUS, OR ON EDGE: SEVERAL DAYS
2. NOT BEING ABLE TO STOP OR CONTROL WORRYING: SEVERAL DAYS
3. WORRYING TOO MUCH ABOUT DIFFERENT THINGS: SEVERAL DAYS

## 2020-02-24 ASSESSMENT — PATIENT HEALTH QUESTIONNAIRE - PHQ9
5. POOR APPETITE OR OVEREATING: MORE THAN HALF THE DAYS
SUM OF ALL RESPONSES TO PHQ QUESTIONS 1-9: 7

## 2020-02-24 NOTE — PROGRESS NOTES
"                                           Progress Note    Client Name: Adam Fish  Date: 2/24/2020         Service Type: Individual  Video Visit: No     Session Start Time: 11:00a  Session End Time: 11:45a     Session Length: 45 minutes    Session #: 15    Attendees: Client attended alone     Treatment Plan Last Reviewed: 11/14/2019  PHQ-9 / JELANI-7: 7 & 6      DATA  Interactive Complexity: No  Crisis: No         Progress Since Last Session (Related to Symptoms / Goals / Homework):   Symptoms: Stable - see Epic for PHQ 9 and JELANI 7 updates    Homework: Not completed - communicate directly with brother.      Episode of Care Goals: Satisfactory progress - ACTION (Actively working towards change); Intervened by reinforcing change plan / affirming steps taken     Current / Ongoing Stressors and Concerns:   Ongoing work to secure a \"more meaningful life\" by adding enjoyable leisure activities and targeting health concerns; acknowledged it is hard for him to be with himself when he is not doing anything; identified long standing self defeating thought of thinking he would be doing more in life, \"not optimizing my potential\", wishing he could be more at peace with himself; also expressed dissatisfaction in his relationship, having the carry the load for gf, but also wondered if he would be feeling less dissatisfied with her if he was doing better personally.      Treatment Objective(s) Addressed in This Session:    Client will decrease frequency and intensity of feeling down, depressed, hopeless as evidenced by checking perspectives, not comparing, and developing self awareness to \"figure out why I feel the way I feel and to be more comfortable in my own skin\". Client will learn 3 anxiety management and assertiveness skills to \"get through daily interactions confidently and comfortably, with less anxiety\" - listening, talking, open dialogue, nasima with family.     Intervention:  CBT: assess maladaptive patterns of " behavior and cognition, teach about ABC of CBT, teach challenging negative self talk, teach communication skills for social-emotional support, teach about empathy  Motivational Interviewing: assess and address ambivalence towards change and readiness and willingness to change, evoke change talk, challenge sustain talk, point out discrepancies, explore ambivalence towards change and road blocks, teach about OARS for interpersonal effectiveness  DBT: teach emotion identification, reinforce core mindfulness skills, teach TIPP skills for emotions regulation, teach scaling questions for emotion identification, teach core mindfulness skills, teach about dialectical thinking and decision making, teach about self compassion         ASSESSMENT: Current Emotional / Mental Status (status of significant symptoms):   Risk status (Self / Other harm or suicidal ideation)  Client denies current fears or concerns for personal safety.  Client denies current or recent suicidal ideation or behaviors. Reports passive, fleeting thoughts about wanting to escape and not have to deal with things a couple weeks ago, but denies active plans or desire to end his life.   Client denies current or recent homicidal ideation or behaviors.  Client denies current or recent self injurious behavior or ideation.  Client denies other safety concerns.  Client reports the following protective factors: positive relationships positive family connections, restricted access to lethal means, dedication to family/friends, safe and stable environment, regular physical activity, adherence with prescribed medication, living with other people, daily obligations, structured day and effective problem-solving skills.    Patient reports there has been no change in risk factors since their last session.     Patient reports there has been no change in protective factors since their last session.     Recommended that patient call 911 or go to the local ED should there be a  change in any of these risk factors.     Appearance:   Appropriate    Eye Contact:   Fair    Psychomotor Behavior: Normal    Attitude:   Cooperative    Orientation:   All   Speech    Rate / Production: Normal     Volume:  Normal    Mood:    Depressed Sad   Affect:    Appropriate  Mood congruent     Thought Content:  Clear    Thought Form:  Coherent  Goal Directed  Logical    Insight:    Good      Medication Review:   See Epic for updates     Medication Compliance:   Yes     Changes in Health Issues:   None reported     Chemical Use Review:   Substance Use: Problem use continues with no change since last session, Patient declined discussion at this time        Tobacco Use: No current tobacco use      Diagnosis:   296.32 (F33.1) Major Depressive Disorder, Recurrent Episode, Moderate & 300.02 (F41.1) Generalized Anxiety Disorder     Collateral Reports Completed:   Not Applicable      PLAN: (Client Tasks / Therapist Tasks / Other)  Therapist will assign homework of skill practice; provide educational materials on distraction, relaxation, and assertiveness skills; role-play conflict management; teach emotional recognition/identification.     By next appt, client will practice acts of self compassion and challenge self defeating thoughts.        Lexi Han [facility]                                                         ______________________________________________________________________    Treatment Plan    Client's Name: Adam Fish  YOB: 1990    Date: 11/14/2019    Diagnoses: 296.32 (F33.1) Major Depressive Disorder, Recurrent Episode, Moderate & 300.02 (F41.1) Generalized Anxiety Disorder & Alcohol Use Disorder, Mild; RULE .23 (F40.10) Social Anxiety Disorder  Psychosocial & Contextual Factors: Lack of reward at work, limited social support re: mental/emotional needs  WHODAS: 24    Referral / Collaboration:  Referral to another professional/service is not indicated at this  "time.    Anticipated number of session or this episode of care: 3-4 months      MeasurableTreatment Goal(s) related to diagnosis / functional impairment(s)  Goal 1: Client will improve mood and alcohol related issues as evidenced by decreased score on PHQ 9 from 15 (moderately severe) to 10 or less (moderate-mild).    I will know I've met my goal when I feel more comfortable and not doubt myself.      Objective #A (Client Action)    Client will decrease frequency and intensity of feeling down, depressed, hopeless as evidenced by checking perspectives, not comparing, and developing self awareness to \"figure out why I feel the way I feel and to be more comfortable in my own skin\".  Status: Continued - Date: 11/14/2019    Intervention(s)  Therapist will assign homework of cognitive, mood, and behavioral tracking/journaling; provide educational materials on cognitive distortions; role-play conflict management; teach the client how to perform a behavioral chain analysis.    Goal 2: Client will better manage anxiety as evidenced by decreased score on JELANI 7 from 15 (severe) to 10 or less (moderate).    I will know I've met my goal when I do not avoid people, stop over thinking, and speak up more.      Objective #A (Client Action)    Client will learn 3 anxiety management and assertiveness skills to \"get through daily interactions confidently and comfortably, with less anxiety\" - listening, talking, open dialogue, nasima with family.  Status: Continued - Date: 11/14/2019, active listening is helping      Intervention(s)  Therapist will assign homework of skill practice; provide educational materials on distraction, relaxation, and assertiveness skills; role-play conflict management; teach emotional recognition/identification.      Patient has reviewed and agreed to the above plan.      IOANA Pearson  11/14/2019  "

## 2020-02-24 NOTE — PROGRESS NOTES
Visit #:  2    Subjective:  Adam Fish is a 30 year old male who is seen in f/u up for:        Somatic dysfunction of lumbar region  Chronic right-sided low back pain with right-sided sciatica  Somatic dysfunction of sacral region  Thoracic segment dysfunction  Paresthesias.     Since last visit on 2/17/2020,  Adam Fish reports:    Area of chief complaint:  Thoracic and Lumbar :  Symptoms are graded at 2/10. The quality is described as stiff, achy.  Motion has increased, but is still not normal. The pt reports an improvement in his overall posture using the lumbar support. He denies significant improvement in the R leg. He notes a slight low back ache today. The pt denies weakness in the R leg or other unusual sx.  Patient feels that they are improved due to a reduction in symptoms.     Since last visit the patient feels that they are 0-10 percent  improved from last visit.       Objective:  The following was observed:    P: palpatory tenderness Lumbar erector spine, Piriformis and Quad lumb R>>L  A: static palpation demonstrates intersegmental asymmetry   R: motion palpation notes restricted motion  T: hypertonicity at: Piriformis R>>L    Segmental spinal dysfunction/restrictions found at:  T4 , T9 , L5 , Sacrum  and PSIS Right       Assessment:    Diagnoses:      1. Somatic dysfunction of lumbar region    2. Chronic right-sided low back pain with right-sided sciatica    3. Somatic dysfunction of sacral region    4. Thoracic segment dysfunction    5. Paresthesias        Patient's condition:  Patient had restrictions pre-manipulation    Treatment effectiveness:  Post manipulation there is better intersegmental movement and Patient claims to feel looser post manipulation      Procedures:  CMT:  16058 Chiropractic manipulative treatment 3-4 regions performed   Thoracic: Diversified, T4, T9, Prone  Lumbar: Diversified, L5, Side posture  Pelvis: Drop Table, Sacrum , PSIS Right , Prone    Modalities:  45385:  US:  1.6  Henderson/cm squared for 8 minutes at 1mhz   Location: R piriformis     Therapeutic procedures:  None    Response to Treatment  Reduction in symptoms as reported by patient    Prognosis: Good    Progress towards Goals: Patient is making progress towards the goal.  Goals:  SITTING: the patient specific goal is to attain pre-injury status of  8 hours comfortably         Recommendations:    Instructions:  none     Follow-up:    Return to care in 1 week with US therapy  Hip stretching  Piriformis .

## 2020-02-25 ENCOUNTER — MYC MEDICAL ADVICE (OUTPATIENT)
Dept: FAMILY MEDICINE | Facility: CLINIC | Age: 30
End: 2020-02-25

## 2020-02-25 DIAGNOSIS — S49.91XA INJURY OF RIGHT SHOULDER, INITIAL ENCOUNTER: Primary | ICD-10-CM

## 2020-02-25 ASSESSMENT — ANXIETY QUESTIONNAIRES: GAD7 TOTAL SCORE: 6

## 2020-02-25 NOTE — TELEPHONE ENCOUNTER
Order faxed to [facility] in [place].  Patient informed via Embarr Downshart    Candi Gil RN

## 2020-02-26 NOTE — TELEPHONE ENCOUNTER
Winifred has questions about this order for MRI of the rt shoulder that was sent yesterday please call [phone]

## 2020-02-26 NOTE — TELEPHONE ENCOUNTER
Spoke with Winifred from . If shoulder ligament is suspected to be torn then need an arthrogram order w/contrast if MRI is just for pain in shoulder than no contrast. Sent mychart to patient for clarification.    Tracy Arambula RN

## 2020-02-26 NOTE — TELEPHONE ENCOUNTER
FS  [facility] is saying if ligament is suspected of being torn then an arthrogram with contrast is indicated. Pt says he suspects it is again.  Pended new order for this imaging.    Please review and sign if appropriate. Triage can re-fax    Thank you,  Tracy Arambula RN

## 2020-03-06 ENCOUNTER — TRANSFERRED RECORDS (OUTPATIENT)
Dept: HEALTH INFORMATION MANAGEMENT | Facility: CLINIC | Age: 30
End: 2020-03-06

## 2020-03-09 ENCOUNTER — THERAPY VISIT (OUTPATIENT)
Dept: CHIROPRACTIC MEDICINE | Facility: CLINIC | Age: 30
End: 2020-03-09
Payer: COMMERCIAL

## 2020-03-09 DIAGNOSIS — M99.03 SOMATIC DYSFUNCTION OF LUMBAR REGION: Primary | ICD-10-CM

## 2020-03-09 DIAGNOSIS — M99.04 SOMATIC DYSFUNCTION OF SACRAL REGION: ICD-10-CM

## 2020-03-09 DIAGNOSIS — M99.02 THORACIC SEGMENT DYSFUNCTION: ICD-10-CM

## 2020-03-09 DIAGNOSIS — M54.41 CHRONIC RIGHT-SIDED LOW BACK PAIN WITH RIGHT-SIDED SCIATICA: ICD-10-CM

## 2020-03-09 DIAGNOSIS — G89.29 CHRONIC RIGHT-SIDED LOW BACK PAIN WITH RIGHT-SIDED SCIATICA: ICD-10-CM

## 2020-03-09 PROCEDURE — 98941 CHIROPRACT MANJ 3-4 REGIONS: CPT | Mod: AT | Performed by: CHIROPRACTOR

## 2020-03-09 PROCEDURE — 97035 APP MDLTY 1+ULTRASOUND EA 15: CPT | Performed by: CHIROPRACTOR

## 2020-03-09 NOTE — PROGRESS NOTES
Visit #:  3    Subjective:  Adam Fish is a 30 year old male who is seen in f/u up for:        Somatic dysfunction of lumbar region  Chronic right-sided low back pain with right-sided sciatica  Somatic dysfunction of sacral region  Thoracic segment dysfunction.     Since last visit,  Adam Fish reports:    Area of chief complaint:  Thoracic and Lumbar :  Symptoms are graded at 3/10. The quality is described as stiff, achy.  Motion has increased, but is still not normal. The pt reports no improvement from the previous treatment. He had a recent MRI to determine where the source of pain is today he reports R lateral hip pain with radiation in the R lateral leg. He denies weakness in the extremities or other unusual sx.  Patient feels that they are improved due to a reduction in symptoms.     Since last visit the patient feels that they are 0-10 percent  improved from last visit.       Objective:  The following was observed:    P: palpatory tenderness Lumbar erector spine, Piriformis and Quad lumb R>>L  A: static palpation demonstrates intersegmental asymmetry   R: motion palpation notes restricted motion  T: hypertonicity at: Piriformis R>>L    Segmental spinal dysfunction/restrictions found at:  T4 , T9 , L5 , Sacrum  and PSIS Right       Assessment:    Diagnoses:      1. Somatic dysfunction of lumbar region    2. Chronic right-sided low back pain with right-sided sciatica    3. Somatic dysfunction of sacral region    4. Thoracic segment dysfunction        Patient's condition:  Patient had restrictions pre-manipulation    Treatment effectiveness:  Post manipulation there is better intersegmental movement and Patient claims to feel looser post manipulation      Procedures:  CMT:  82916 Chiropractic manipulative treatment 3-4 regions performed   Thoracic: Diversified, T4, T9, Prone  Lumbar: Diversified, L5, Side posture  Pelvis: Drop Table, Sacrum , PSIS Right , Prone    Modalities:  09448: US:  1.8   Henderson/cm squared for 8 minutes at 1mhz   Location: R piriformis     Therapeutic procedures:  None    Response to Treatment  Reduction in symptoms as reported by patient    Prognosis: Good    Progress towards Goals: Patient is making progress towards the goal.  Goals:  SITTING: the patient specific goal is to attain pre-injury status of  8 hours comfortably         Recommendations: ACP    Instructions:  none     Follow-up:    Return to care in 1 week with US therapy  Hip stretching* press-ups   Piriformis .

## 2020-03-11 ENCOUNTER — MYC MEDICAL ADVICE (OUTPATIENT)
Dept: FAMILY MEDICINE | Facility: CLINIC | Age: 30
End: 2020-03-11

## 2020-03-11 DIAGNOSIS — S43.431A TEAR OF RIGHT GLENOID LABRUM, INITIAL ENCOUNTER: Primary | ICD-10-CM

## 2020-03-11 NOTE — TELEPHONE ENCOUNTER
Results discussed.   Another tear of the right Glenoid labrum.   Advised to see Ortho. Patient desires to see [facility].   Referral placed.   Copy of the report will be mailed to patient.     Anatoliy

## 2020-03-12 ENCOUNTER — OFFICE VISIT (OUTPATIENT)
Dept: PSYCHOLOGY | Facility: CLINIC | Age: 30
End: 2020-03-12
Payer: COMMERCIAL

## 2020-03-12 DIAGNOSIS — F41.1 GENERALIZED ANXIETY DISORDER: ICD-10-CM

## 2020-03-12 DIAGNOSIS — F10.10 ALCOHOL USE DISORDER, MILD, ABUSE: ICD-10-CM

## 2020-03-12 DIAGNOSIS — F33.1 MAJOR DEPRESSIVE DISORDER, RECURRENT EPISODE, MODERATE (H): Primary | ICD-10-CM

## 2020-03-12 PROCEDURE — 90834 PSYTX W PT 45 MINUTES: CPT | Performed by: COUNSELOR

## 2020-03-12 ASSESSMENT — PATIENT HEALTH QUESTIONNAIRE - PHQ9
5. POOR APPETITE OR OVEREATING: SEVERAL DAYS
SUM OF ALL RESPONSES TO PHQ QUESTIONS 1-9: 6

## 2020-03-12 ASSESSMENT — ANXIETY QUESTIONNAIRES
6. BECOMING EASILY ANNOYED OR IRRITABLE: SEVERAL DAYS
3. WORRYING TOO MUCH ABOUT DIFFERENT THINGS: NOT AT ALL
2. NOT BEING ABLE TO STOP OR CONTROL WORRYING: SEVERAL DAYS
1. FEELING NERVOUS, ANXIOUS, OR ON EDGE: SEVERAL DAYS
7. FEELING AFRAID AS IF SOMETHING AWFUL MIGHT HAPPEN: NOT AT ALL
5. BEING SO RESTLESS THAT IT IS HARD TO SIT STILL: NOT AT ALL
GAD7 TOTAL SCORE: 4
IF YOU CHECKED OFF ANY PROBLEMS ON THIS QUESTIONNAIRE, HOW DIFFICULT HAVE THESE PROBLEMS MADE IT FOR YOU TO DO YOUR WORK, TAKE CARE OF THINGS AT HOME, OR GET ALONG WITH OTHER PEOPLE: SOMEWHAT DIFFICULT

## 2020-03-12 NOTE — PROGRESS NOTES
"                                           Progress Note    Client Name: Adam Fish  Date: 3/12/2020         Service Type: Individual  Video Visit: No     Session Start Time: 10:00a  Session End Time: 10:45a     Session Length: 45 minutes    Session #: 16    Attendees: Client attended alone     Treatment Plan Last Reviewed: 3/12/2020  PHQ-9 / JELANI-7: 6 & 4      DATA  Interactive Complexity: No  Crisis: No         Progress Since Last Session (Related to Symptoms / Goals / Homework):   Symptoms: Stable - see Epic for PHQ 9 and JELANI 7 updates    Homework: Not completed - practice acts of self compassion and challenge self defeating thoughts.      Episode of Care Goals: Satisfactory progress - ACTION (Actively working towards change); Intervened by reinforcing change plan / affirming steps taken     Current / Ongoing Stressors and Concerns:   Reported mixed weeks of feeling down and feeling good; reviewed goals and acknowledged a need to address family relationships more; reported feeling dismissed by brothers and not having a voice when they make decisions about social outings; acknowledged a personality clash and not meeting each other half way to optimize communication; ongoing work to reframe progress with gf's mental health; reported wanting to be more thoughtful about alcohol intake and how it affects his mental health.      Treatment Objective(s) Addressed in This Session:    Client will decrease frequency and intensity of feeling down, depressed, hopeless as evidenced by checking perspectives, not comparing, and developing self awareness to \"figure out why I feel the way I feel and to be more comfortable in my own skin\". Client will learn 3 anxiety management and assertiveness skills to \"get through daily interactions confidently and comfortably, with less anxiety\" - listening, talking, open dialogue, nasima with family. Client will monitor and track alcohol use and affects on " mood.     Intervention:  CBT: assess maladaptive patterns of behavior and cognition, teach about ABC of CBT, teach challenging negative self talk, teach communication skills for social-emotional support, teach about empathy  Motivational Interviewing: assess and address ambivalence towards change and readiness and willingness to change, evoke change talk, challenge sustain talk, point out discrepancies, explore ambivalence towards change and road blocks, teach about OARS for interpersonal effectiveness  DBT: teach emotion identification, reinforce core mindfulness skills, teach TIPP skills for emotions regulation, teach scaling questions for emotion identification, teach core mindfulness skills, teach about dialectical thinking and decision making, teach about self compassion         ASSESSMENT: Current Emotional / Mental Status (status of significant symptoms):   Risk status (Self / Other harm or suicidal ideation)  Client denies current fears or concerns for personal safety.  Client denies current or recent suicidal ideation or behaviors. Reports passive, fleeting thoughts about wanting to escape and not have to deal with things a couple weeks ago, but denies active plans or desire to end his life.   Client denies current or recent homicidal ideation or behaviors.  Client denies current or recent self injurious behavior or ideation.  Client denies other safety concerns.  Client reports the following protective factors: positive relationships positive family connections, restricted access to lethal means, dedication to family/friends, safe and stable environment, regular physical activity, adherence with prescribed medication, living with other people, daily obligations, structured day and effective problem-solving skills.    Patient reports there has been no change in risk factors since their last session.     Patient reports there has been no change in protective factors since their last session.     Recommended  that patient call 911 or go to the local ED should there be a change in any of these risk factors.     Appearance:   Appropriate    Eye Contact:   Fair    Psychomotor Behavior: Normal    Attitude:   Cooperative    Orientation:   All   Speech    Rate / Production: Normal     Volume:  Normal    Mood:    Depressed    Affect:    Appropriate  Mood congruent     Thought Content:  Clear    Thought Form:  Coherent  Goal Directed  Logical    Insight:    Good      Medication Review:   See Epic for updates     Medication Compliance:   Yes     Changes in Health Issues:   None reported     Chemical Use Review:   Substance Use: Problem use continues with no change since last session, Patient declined discussion at this time        Tobacco Use: No current tobacco use      Diagnosis:   296.32 (F33.1) Major Depressive Disorder, Recurrent Episode, Moderate & 300.02 (F41.1) Generalized Anxiety Disorder     Collateral Reports Completed:   Not Applicable      PLAN: (Client Tasks / Therapist Tasks / Other)  Therapist will assign homework of skill practice; provide educational materials on distraction, relaxation, and assertiveness skills; role-play conflict management; teach emotional recognition/identification.     By next appt, client will practice assertive and planned communication with brothers and reframe progress with girlfriend.        Lexi Han [facility]                                                         ______________________________________________________________________    Treatment Plan    Client's Name: Adam Fish  YOB: 1990    Date: 3/12/2020    Diagnoses: 296.32 (F33.1) Major Depressive Disorder, Recurrent Episode, Moderate & 300.02 (F41.1) Generalized Anxiety Disorder & Alcohol Use Disorder, Mild; RULE .23 (F40.10) Social Anxiety Disorder  Psychosocial & Contextual Factors: Lack of reward at work, limited social support re: mental/emotional needs  WHODAS: 24    Referral /  "Collaboration:  Referral to another professional/service is not indicated at this time.    Anticipated number of session or this episode of care: 3-4 months      MeasurableTreatment Goal(s) related to diagnosis / functional impairment(s)  Goal 1: Client will improve mood and alcohol related issues as evidenced by decreased score on PHQ 9 from 15 (moderately severe) to 10 or less (moderate-mild).    I will know I've met my goal when I feel more comfortable and not doubt myself.      Objective #A (Client Action)    Client will decrease frequency and intensity of feeling down, depressed, hopeless as evidenced by checking perspectives, not comparing, and developing self awareness to \"figure out why I feel the way I feel and to be more comfortable in my own skin\".  Status: Continued - Date: 3/12/2020, align self external and internal self, be more accepting if he is not as social or does not have something to say     Intervention(s)  Therapist will assign homework of cognitive, mood, and behavioral tracking/journaling; provide educational materials on cognitive distortions; role-play conflict management; teach the client how to perform a behavioral chain analysis.    Objective #B (Client Action)    Client will monitor and track alcohol use and affects on mood.  Status: New - Date: 3/12/2020      Intervention(s)  Therapist will assign homework of self monitoring; provide educational materials on the effects of alcohol on mood; role-play assertiveness as it relates to limit setting with self and others; teach the client how to perform a behavioral chain analysis.    Goal 2: Client will better manage anxiety as evidenced by decreased score on JELANI 7 from 15 (severe) to 10 or less (moderate).    I will know I've met my goal when I do not avoid people, stop over thinking, and speak up more.      Objective #A (Client Action)    Client will learn 3 anxiety management and assertiveness skills to \"get through daily interactions " "confidently and comfortably, with less anxiety\" - listening, talking, open dialogue, nasima with family.  Status: Continued - Date: 3/12/2020, active listening is helping, overall more comfortable day to day, being ok with not having something to say    Intervention(s)  Therapist will assign homework of skill practice; provide educational materials on distraction, relaxation, and assertiveness skills; role-play conflict management; teach emotional recognition/identification.      Patient has reviewed and agreed to the above plan.      IOANA Pearson  3/12/2020  "

## 2020-03-13 ASSESSMENT — ANXIETY QUESTIONNAIRES: GAD7 TOTAL SCORE: 4

## 2020-03-18 DIAGNOSIS — G89.29 CHRONIC BILATERAL LOW BACK PAIN WITH RIGHT-SIDED SCIATICA: ICD-10-CM

## 2020-03-18 DIAGNOSIS — M54.41 CHRONIC BILATERAL LOW BACK PAIN WITH RIGHT-SIDED SCIATICA: ICD-10-CM

## 2020-03-18 NOTE — TELEPHONE ENCOUNTER
gabapentin (NEURONTIN) 300 MG capsule  Last Written Prescription Date:  01/31/2020  Last Fill Quantity: 90,  # refills: 0   Last office visit: 2/14/2020 with prescribing provider:  BENJAMÍN   Future Office Visit: Nothing at this time scheduled.    Next 5 appointments (look out 90 days)    Mar 26, 2020 10:00 AM CDT  Return Visit with Lexi Han [facility] ([place] [id number] [address] [address]  [phone]   Apr 13, 2020 12:45 PM CDT  Return Visit with Waleska Azevedo MD  [facility] ([facility] [address] [address] [phone]         Requested Prescriptions   Pending Prescriptions Disp Refills     gabapentin (NEURONTIN) 300 MG capsule 90 capsule 0     Sig: Take 1 capsule (300 mg) by mouth 3 times daily as needed (pain)       There is no refill protocol information for this order

## 2020-03-19 RX ORDER — GABAPENTIN 300 MG/1
300 CAPSULE ORAL 3 TIMES DAILY PRN
Qty: 90 CAPSULE | Refills: 0 | Status: SHIPPED | OUTPATIENT
Start: 2020-03-19 | End: 2020-03-30

## 2020-03-19 NOTE — TELEPHONE ENCOUNTER
Routing refill request to provider for review/approval because:  Drug not on the [facility] refill protocol   Mayela POLLARD RN

## 2020-03-23 PROBLEM — M79.644 PAIN OF FINGER OF RIGHT HAND: Status: RESOLVED | Noted: 2020-01-16 | Resolved: 2020-03-23

## 2020-03-23 PROBLEM — M25.531 RIGHT WRIST PAIN: Status: RESOLVED | Noted: 2020-01-16 | Resolved: 2020-03-23

## 2020-03-25 ENCOUNTER — MYC MEDICAL ADVICE (OUTPATIENT)
Dept: PSYCHOLOGY | Facility: CLINIC | Age: 30
End: 2020-03-25

## 2020-03-26 ENCOUNTER — VIRTUAL VISIT (OUTPATIENT)
Dept: PSYCHOLOGY | Facility: CLINIC | Age: 30
End: 2020-03-26
Payer: COMMERCIAL

## 2020-03-26 DIAGNOSIS — F41.1 GENERALIZED ANXIETY DISORDER: ICD-10-CM

## 2020-03-26 DIAGNOSIS — F33.1 MAJOR DEPRESSIVE DISORDER, RECURRENT EPISODE, MODERATE (H): Primary | ICD-10-CM

## 2020-03-26 PROCEDURE — 90834 PSYTX W PT 45 MINUTES: CPT | Mod: TEL | Performed by: COUNSELOR

## 2020-03-26 NOTE — PROGRESS NOTES
"                                           Telephone Progress Note    Client Name: Adam Fish  Date: 3/26/2020         Service Type: Individual  Video Visit: No     Session Start Time: 10:00a  Session End Time: 10:45a     Session Length: 45 minutes    Session #: 17    Attendees: Client attended alone     Treatment Plan Last Reviewed: 3/12/2020  PHQ-9 / JELANI-7: 6 & 4    Telemedicine Visit: The patient's condition can be safely assessed and treated via synchronous audio and visual telemedicine encounter.      Reason for Telemedicine Visit: Ongoing therapy    Originating Site (Patient Location): Patient's home    Distant Site (Provider Location): [facility]     Consent: The patient/guardian has verbally consented to: the potential risks and benefits of telemedicine (video visit) versus in person care; bill my  insurance or make self-payment for services  provided; and responsibility for payment of non-covered services.    The patient has been notified of the following:       \"We have found that certain health care needs can be provided without the need for a face to face visit. This service lets us provide the care you need with a phone conversation.       I will have full access to your [place] medical record during this entire phone call. I will be taking notes for your medical record.      Since this is like an office visit, we will bill your insurance company for this service.       There are potential benefits and risks of telephone visits (e.g. limits to patient confidentiality) that differ from in-person visits.?Confidentiality still applies for telephone services, and nobody will record the visit.  It is important to be in a quiet, private space that is free of distractions (including cell phone or other devices) during the visit.??      If during the course of the call I believe a telephone visit is not appropriate, you will not be charged for this service\"     Consent has been " "obtained for this service by care team member: Yes       DATA  Interactive Complexity: No  Crisis: No         Progress Since Last Session (Related to Symptoms / Goals / Homework):   Symptoms: Stable - see Epic for PHQ 9 and JELANI 7 updates    Homework: Partially completed - practice assertive and planned communication with brothers and reframe progress with girlfriend.      Episode of Care Goals: Satisfactory progress - ACTION (Actively working towards change); Intervened by reinforcing change plan / affirming steps taken     Current / Ongoing Stressors and Concerns:   Unable to report progress on relationship with brother because he felt uncomfortable with brother being at home during time of call; reported gf and him agreed to have a \"reset\" in relationship - starting a new normal, putting all expectations on the table, clear and open communication, not let things build up; feels work is going well, made a goal at work to be more communicative, collaborative, and participatory; ongoing processing of how much to disclose at work about his MH; described supervisor to be receptive to MH needs and accommodations.      Treatment Objective(s) Addressed in This Session:    Client will decrease frequency and intensity of feeling down, depressed, hopeless as evidenced by checking perspectives, not comparing, and developing self awareness to \"figure out why I feel the way I feel and to be more comfortable in my own skin\". Client will learn 3 anxiety management and assertiveness skills to \"get through daily interactions confidently and comfortably, with less anxiety\" - listening, talking, open dialogue, nasima with family. Client will monitor and track alcohol use and affects on mood.     Intervention:  CBT: assess maladaptive patterns of behavior and cognition, teach about ABC of CBT, teach challenging negative self talk, teach communication skills for social-emotional support, teach about empathy  Motivational Interviewing: " assess and address ambivalence towards change and readiness and willingness to change, evoke change talk, challenge sustain talk, point out discrepancies, explore ambivalence towards change and road blocks, teach about OARS for interpersonal effectiveness  DBT: teach emotion identification, reinforce core mindfulness skills, teach TIPP skills for emotions regulation, teach scaling questions for emotion identification, teach core mindfulness skills, teach about dialectical thinking and decision making, teach about self compassion         ASSESSMENT: Current Emotional / Mental Status (status of significant symptoms):   Risk status (Self / Other harm or suicidal ideation)  Client denies current fears or concerns for personal safety.  Client denies current or recent suicidal ideation or behaviors. Reports passive, fleeting thoughts about wanting to escape and not have to deal with things a couple weeks ago, but denies active plans or desire to end his life.   Client denies current or recent homicidal ideation or behaviors.  Client denies current or recent self injurious behavior or ideation.  Client denies other safety concerns.  Client reports the following protective factors: positive relationships positive family connections, restricted access to lethal means, dedication to family/friends, safe and stable environment, regular physical activity, adherence with prescribed medication, living with other people, daily obligations, structured day and effective problem-solving skills.    Patient reports there has been no change in risk factors since their last session.     Patient reports there has been no change in protective factors since their last session.     Recommended that patient call 911 or go to the local ED should there be a change in any of these risk factors.     Appearance:   n/a    Eye Contact:   n/a    Psychomotor Behavior: n/a    Attitude:   Cooperative    Orientation:   All   Speech    Rate /  "Production: Normal     Volume:  Normal    Mood:    Depressed    Affect:    Appropriate  Mood congruent     Thought Content:  Clear    Thought Form:  Coherent  Goal Directed  Logical    Insight:    Good      Medication Review:   See Epic for updates     Medication Compliance:   Yes     Changes in Health Issues:   None reported     Chemical Use Review:   Substance Use: Problem use continues with no change since last session, Patient declined discussion at this time        Tobacco Use: No current tobacco use      Diagnosis:   296.32 (F33.1) Major Depressive Disorder, Recurrent Episode, Moderate & 300.02 (F41.1) Generalized Anxiety Disorder     Collateral Reports Completed:   Not Applicable      PLAN: (Client Tasks / Therapist Tasks / Other)  Therapist will assign homework of skill practice; provide educational materials on distraction, relaxation, and assertiveness skills; role-play conflict management; teach emotional recognition/identification.     By next appt, client will \"reset for new normal for relationship and self\".         Lexi Han [facility]                                                         ______________________________________________________________________    Treatment Plan    Client's Name: Adam Fish  YOB: 1990    Date: 3/12/2020    Diagnoses: 296.32 (F33.1) Major Depressive Disorder, Recurrent Episode, Moderate & 300.02 (F41.1) Generalized Anxiety Disorder & Alcohol Use Disorder, Mild; RULE .23 (F40.10) Social Anxiety Disorder  Psychosocial & Contextual Factors: Lack of reward at work, limited social support re: mental/emotional needs  WHODAS: 24    Referral / Collaboration:  Referral to another professional/service is not indicated at this time.    Anticipated number of session or this episode of care: 3-4 months      MeasurableTreatment Goal(s) related to diagnosis / functional impairment(s)  Goal 1: Client will improve mood and alcohol related issues as evidenced by " "decreased score on PHQ 9 from 15 (moderately severe) to 10 or less (moderate-mild).    I will know I've met my goal when I feel more comfortable and not doubt myself.      Objective #A (Client Action)    Client will decrease frequency and intensity of feeling down, depressed, hopeless as evidenced by checking perspectives, not comparing, and developing self awareness to \"figure out why I feel the way I feel and to be more comfortable in my own skin\".  Status: Continued - Date: 3/12/2020, align self external and internal self, be more accepting if he is not as social or does not have something to say     Intervention(s)  Therapist will assign homework of cognitive, mood, and behavioral tracking/journaling; provide educational materials on cognitive distortions; role-play conflict management; teach the client how to perform a behavioral chain analysis.    Objective #B (Client Action)    Client will monitor and track alcohol use and affects on mood.  Status: New - Date: 3/12/2020      Intervention(s)  Therapist will assign homework of self monitoring; provide educational materials on the effects of alcohol on mood; role-play assertiveness as it relates to limit setting with self and others; teach the client how to perform a behavioral chain analysis.    Goal 2: Client will better manage anxiety as evidenced by decreased score on JELANI 7 from 15 (severe) to 10 or less (moderate).    I will know I've met my goal when I do not avoid people, stop over thinking, and speak up more.      Objective #A (Client Action)    Client will learn 3 anxiety management and assertiveness skills to \"get through daily interactions confidently and comfortably, with less anxiety\" - listening, talking, open dialogue, nasima with family.  Status: Continued - Date: 3/12/2020, active listening is helping, overall more comfortable day to day, being ok with not having something to say    Intervention(s)  Therapist will assign homework of skill practice; " provide educational materials on distraction, relaxation, and assertiveness skills; role-play conflict management; teach emotional recognition/identification.      Patient has reviewed and agreed to the above plan.      IOANA Pearson  3/12/2020

## 2020-03-31 ENCOUNTER — MYC MEDICAL ADVICE (OUTPATIENT)
Dept: FAMILY MEDICINE | Facility: CLINIC | Age: 30
End: 2020-03-31

## 2020-04-07 ENCOUNTER — MYC REFILL (OUTPATIENT)
Dept: PSYCHOLOGY | Facility: CLINIC | Age: 30
End: 2020-04-07

## 2020-04-07 DIAGNOSIS — F41.1 GENERALIZED ANXIETY DISORDER: ICD-10-CM

## 2020-04-07 NOTE — TELEPHONE ENCOUNTER
I am temporarily unable to electronically sign scripts.  Can someone please help out for a day or so?  Thank you!

## 2020-04-07 NOTE — TELEPHONE ENCOUNTER
Controlled Substance Refill Request for Ativan    Last refill: 2/20/20    Last clinic visit: 2/20/20     Clinic visit frequency required: 6 weeks  Next appt: 4/13/20    Controlled substance agreement on file: no    Documentation in problem list reviewed:  yes    Processing: e-prescribe to pharmacy vs deferring until his office visit 4/13/20    [place] concerns  [place]  [email or web address]

## 2020-04-08 ENCOUNTER — MYC MEDICAL ADVICE (OUTPATIENT)
Dept: PSYCHOLOGY | Facility: CLINIC | Age: 30
End: 2020-04-08

## 2020-04-08 ENCOUNTER — COMMUNICATION - HEALTHEAST (OUTPATIENT)
Dept: BEHAVIORAL HEALTH | Facility: CLINIC | Age: 30
End: 2020-04-08

## 2020-04-08 RX ORDER — LORAZEPAM 0.5 MG/1
0.5 TABLET ORAL DAILY PRN
Qty: 30 TABLET | Refills: 0 | Status: SHIPPED | OUTPATIENT
Start: 2020-04-08 | End: 2020-07-16

## 2020-04-08 NOTE — TELEPHONE ENCOUNTER
Routing to covering provider pool.    Yamilet Leiva, RN    Nurse Liaison  [facility]

## 2020-04-13 ENCOUNTER — VIRTUAL VISIT (OUTPATIENT)
Dept: PSYCHOLOGY | Facility: CLINIC | Age: 30
End: 2020-04-13
Payer: COMMERCIAL

## 2020-04-13 DIAGNOSIS — F10.10 ALCOHOL ABUSE: ICD-10-CM

## 2020-04-13 DIAGNOSIS — F34.1 DYSTHYMIA: ICD-10-CM

## 2020-04-13 DIAGNOSIS — F41.1 GENERALIZED ANXIETY DISORDER: Primary | ICD-10-CM

## 2020-04-13 PROCEDURE — 99214 OFFICE O/P EST MOD 30 MIN: CPT | Mod: 95 | Performed by: PSYCHIATRY & NEUROLOGY

## 2020-04-13 RX ORDER — PROPRANOLOL HYDROCHLORIDE 20 MG/1
20 TABLET ORAL DAILY
Qty: 30 TABLET | Refills: 1 | Status: SHIPPED | OUTPATIENT
Start: 2020-04-13 | End: 2020-06-17

## 2020-04-13 ASSESSMENT — ANXIETY QUESTIONNAIRES
2. NOT BEING ABLE TO STOP OR CONTROL WORRYING: SEVERAL DAYS
GAD7 TOTAL SCORE: 6
IF YOU CHECKED OFF ANY PROBLEMS ON THIS QUESTIONNAIRE, HOW DIFFICULT HAVE THESE PROBLEMS MADE IT FOR YOU TO DO YOUR WORK, TAKE CARE OF THINGS AT HOME, OR GET ALONG WITH OTHER PEOPLE: SOMEWHAT DIFFICULT
1. FEELING NERVOUS, ANXIOUS, OR ON EDGE: MORE THAN HALF THE DAYS
3. WORRYING TOO MUCH ABOUT DIFFERENT THINGS: SEVERAL DAYS
7. FEELING AFRAID AS IF SOMETHING AWFUL MIGHT HAPPEN: NOT AT ALL
5. BEING SO RESTLESS THAT IT IS HARD TO SIT STILL: NOT AT ALL
6. BECOMING EASILY ANNOYED OR IRRITABLE: SEVERAL DAYS

## 2020-04-13 ASSESSMENT — PATIENT HEALTH QUESTIONNAIRE - PHQ9
5. POOR APPETITE OR OVEREATING: SEVERAL DAYS
SUM OF ALL RESPONSES TO PHQ QUESTIONS 1-9: 8

## 2020-04-13 NOTE — PATIENT INSTRUCTIONS
Start propranolol 20 mg at bedtime for anxiety.    Continue Wellbutrin  mg daily.  You may increase to 450 mg daily if you choose.    Continue buspirone 30 mg twice daily.    Continue fluoxetine 60 mg daily.    Continue lorazepam 0.5 mg daily as needed.  If you do not tolerate propranolol, you may use your Lorazepam for insomnia.    Continue all other medications per your primary care provider.    Continue individual therapy.    Schedule an appointment with me in 4 weeks or sooner as needed.  You may call [facility] at [phone] to schedule, or schedule at the .    [facility] Resources:      Go to the Emergency Department as needed or call after hours crisis line at [phone].      To schedule individual or family therapy, call [facility] at [phone].     Follow up with primary care provider as planned or sooner for acute medical concerns.    Call the psychiatric nurse line with medication questions or concerns at [phone].    6connectt may be used to communicate with your provider, but this is not intended to be used for emergencies.    Community Resources:      National Suicide Prevention Lifeline: 210.382.1645 (TTY: 470.714.3618). Call anytime for help.  (www.suicidepreventionlifeline.org)    National [place] on Mental Illness (www.rosalio.org): 967.979.4356 or 192-957-6071.     Mental Health Association (www.mentalhealth.org): 742.408.5803 or 915-997-5160.    [place] Crisis Text [place]: Text [place] to 779013    Suicide LifeLine Chat: suicidepreFanDistroline.org/chat

## 2020-04-13 NOTE — PROGRESS NOTES
"Adam Fish is a 30 year old male who is being evaluated via a billable video visit.      The patient has been notified of following:     \"This video visit will be conducted via a call between you and your physician/provider. We have found that certain health care needs can be provided without the need for an in-person physical exam.  This service lets us provide the care you need with a video conversation.  If a prescription is necessary we can send it directly to your pharmacy.  If lab work is needed we can place an order for that and you can then stop by our lab to have the test done at a later time.    Video visits are billed at different rates depending on your insurance coverage.  Please reach out to your insurance provider with any questions.    If during the course of the call the physician/provider feels a video visit is not appropriate, you will not be charged for this service.\"    Patient has given verbal consent for Video visit? Yes    How would you like to obtain your AVS? Reggiehart    Patient would like the video invitation sent by: Email [email or web address]      Video visit was unsuccessful, converted to telephone visit    Additional provider notes:        Outpatient Psychiatric Progress Note    Name: Adam Fish   : 1990                    Primary Care Provider: Lulu Cope MD   Therapist: Héctor Pearson and a private practitioner for EMDR      PHQ-9 scores:  PHQ-9 SCORE 2020 3/12/2020 2020   PHQ-9 Total Score - - -   PHQ-9 Total Score 7 6 8       JELANI-7 scores:  JELANI-7 SCORE 2020 3/12/2020 2020   Total Score - - -   Total Score 6 4 6       Patient Identification:  Adam is a 30 year old year old, in a relationship   White American male  who presents for return visit with me.  Patient attended the session alone.     Interim History:  Adam and I tried unsuccessfully to come visit by video, but were having technical difficulties, so transferred to a " "phone visit.    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  He is interested in pursuing photography, or more outdoor jobs such as being a .    He reports that his relationship has been going well.  They have been able to have some good conversations and have been able to resolve some issues.    He is still drinking a couple of times a week usually about 3 beverages on one occasion and 6 on another.  We discussed how the alcohol in the be acting as a depressant and disrupting his sleep cycle.  He is aware of these things, and has been doing some monitoring of how he feels after a weekend of using alcohol.  He recognizes that he can enjoy things without drinking but has been in the habit for so long it is hard to think about giving it up forever.    He asked about propranolol for his anxiety.  He often wakes in the night with his heart racing.  We discussed risks and benefits, and agreed to a trial of 20 mg at bedtime.  If he does not tolerate the propranolol, he could use for lorazepam at bedtime.  He is only using the lorazepam a couple of times a week at this point.  We also considered increasing Wellbutrin to 450 mg daily.  He has some additional 150 mg tablets, and may start taking 450 mg daily in a couple of weeks.  He expects that his mood will improve as the weather improves, but if it does not, he will probably start the Wellbutrin.    Vital Signs:   There were no vitals taken for this visit.    Current Medications:  Current Outpatient Medications "   Medication     buPROPion (WELLBUTRIN XL) 300 MG 24 hr tablet     busPIRone HCl 30 MG PO tablet     FLUoxetine (PROZAC) 40 MG capsule     FLUoxetine 20 MG PO capsule     LORazepam (ATIVAN) 0.5 MG tablet     propranolol (INDERAL) 20 MG tablet     No current facility-administered medications for this visit.       Mental Status Examination:  Adam is a 30-year-old man who does not appear to be in any acute distress.  Speech is clear and normal in rate and tone.  Mood is mildly dysphoric.  Thoughts are logical and spontaneous with no loose associations or flight of ideas.  Thought content shows no psychosis.  He denies suicidal or homicidal thoughts.  He is alert and oriented x3.    Assessment and Plan:    ICD-10-CM    1. Generalized anxiety disorder  F41.1 propranolol (INDERAL) 20 MG tablet   2. Dysthymia  F34.1    3. Alcohol abuse  F10.10        Medical comorbidities include:   Patient Active Problem List   Diagnosis     CARDIOVASCULAR SCREENING; LDL GOAL LESS THAN 160     Impingement syndrome, shoulder     Depression with anxiety     Elevated BP without diagnosis of hypertension     Chronic right-sided low back pain with right-sided sciatica     Dysthymia     Kyphosis (acquired) (postural)     Poor posture     Thoracic segment dysfunction     Sacral pain     Somatic dysfunction of sacral region     Somatic dysfunction of lumbar region     Paresthesias     Generalized anxiety disorder     Alcohol abuse       Treatment Plan:  Patient Instructions   Start propranolol 20 mg at bedtime for anxiety.    Continue Wellbutrin  mg daily.  You may increase to 450 mg daily if you choose.    Continue buspirone 30 mg twice daily.    Continue fluoxetine 60 mg daily.    Continue lorazepam 0.5 mg daily as needed.  If you do not tolerate propranolol, you may use your Lorazepam for insomnia.    Continue all other medications per your primary care provider.    Continue individual therapy.    Schedule an appointment with me in 4  weeks or sooner as needed.  You may call [facility] at [phone] to schedule, or schedule at the .    [facility] Resources:      Go to the Emergency Department as needed or call after hours crisis line at [phone].      To schedule individual or family therapy, call [facility] at [phone].     Follow up with primary care provider as planned or sooner for acute medical concerns.    Call the psychiatric nurse line with medication questions or concerns at [phone].    Contix may be used to communicate with your provider, but this is not intended to be used for emergencies.    Community Resources:      National Suicide Prevention Lifeline: 479.896.7304 (TTY: 249.426.1274). Call anytime for help.  (www.suicidepreventionlifeline.org)    National [place] on Mental Illness (www.rosalio.org): 388.241.5856 or 772-545-1614.     Mental Health Association (www.mentalhealth.org): 450.315.4243 or 045-487-7025.    [place] Crisis Text [place]: Text [place] to 108312    Suicide LifeLine Chat: suicidepreHunton Oilline.org/chat       Administrative Billing:   I spent approximately 30 minutes with Escobar, greater than 50% in counseling regarding substance use, medications, and alternative treatment options.    Patient Status:  Patient will continue to be seen for ongoing consultation and stabilization.

## 2020-04-14 ASSESSMENT — ANXIETY QUESTIONNAIRES: GAD7 TOTAL SCORE: 6

## 2020-04-27 ENCOUNTER — TRANSFERRED RECORDS (OUTPATIENT)
Dept: HEALTH INFORMATION MANAGEMENT | Facility: CLINIC | Age: 30
End: 2020-04-27

## 2020-04-28 ENCOUNTER — COMMUNICATION - HEALTHEAST (OUTPATIENT)
Dept: BEHAVIORAL HEALTH | Facility: CLINIC | Age: 30
End: 2020-04-28

## 2020-05-11 DIAGNOSIS — F41.1 GENERALIZED ANXIETY DISORDER: ICD-10-CM

## 2020-05-11 RX ORDER — PROPRANOLOL HYDROCHLORIDE 20 MG/1
20 TABLET ORAL DAILY
Qty: 30 TABLET | Refills: 1 | Status: CANCELLED | OUTPATIENT
Start: 2020-05-11

## 2020-05-13 NOTE — TELEPHONE ENCOUNTER
Last Rx written on 4/13/20 for #30 with 1 refill.  Patient has follow up on 5/20.   Can discuss refills at that time.  Patient should have enough medication if taken as prescribed.    Yamilet Leiva RN    Nurse Liaison  [facility]

## 2020-05-21 ENCOUNTER — TELEPHONE (OUTPATIENT)
Dept: BEHAVIORAL HEALTH | Facility: CLINIC | Age: 30
End: 2020-05-21

## 2020-05-21 DIAGNOSIS — F41.8 DEPRESSION WITH ANXIETY: Primary | ICD-10-CM

## 2020-05-21 RX ORDER — BUPROPION HYDROCHLORIDE 300 MG/1
300 TABLET ORAL EVERY MORNING
Qty: 30 TABLET | Refills: 0 | Status: SHIPPED | OUTPATIENT
Start: 2020-05-21 | End: 2020-08-06

## 2020-05-21 NOTE — TELEPHONE ENCOUNTER
Patient needs to schedule his follow-up. He missed his last virtual visit 5/20/20.  Last completed visit ws 4/13/20.     [place] refill given. We will reach out to the patient and attempt to schedule them.

## 2020-05-22 NOTE — TELEPHONE ENCOUNTER
Patient is scheduled for 6.25.2020 for next available follow up  With Dr Azevedo and is now aware he will need to be available to answer initial phone call from [facility] to go through rooming questions before he will receive any email links to virtual visit, also placed on cancellation list for Dr Azevedo

## 2020-06-12 DIAGNOSIS — F41.1 GENERALIZED ANXIETY DISORDER: ICD-10-CM

## 2020-06-16 ENCOUNTER — MYC REFILL (OUTPATIENT)
Dept: PSYCHOLOGY | Facility: CLINIC | Age: 30
End: 2020-06-16

## 2020-06-16 DIAGNOSIS — F34.1 DYSTHYMIA: ICD-10-CM

## 2020-06-17 ENCOUNTER — MYC REFILL (OUTPATIENT)
Dept: BEHAVIORAL HEALTH | Facility: CLINIC | Age: 30
End: 2020-06-17

## 2020-06-17 DIAGNOSIS — F41.1 GENERALIZED ANXIETY DISORDER: ICD-10-CM

## 2020-06-18 NOTE — TELEPHONE ENCOUNTER
Refill for: FLUoxetine (PROZAC) 40 MG capsule                 propranolol (INDERAL) 20 MG tablet    Last Appointment: 4/13/20    Next Appointment: 6/25/20    No Shows/Cancellations since last appointment: no show: 5/20    Last Refill in Epic (date and amount/how many days):    Disp  Refills  Start  End  NICOLÁS    FLUoxetine (PROZAC) 40 MG capsule  90 capsule  1  11/27/2019   No    Sig - Route: Take 1 capsule (40 mg) by mouth daily - Oral       Disp  Refills  Start  End  NICOLÁS    propranolol (INDERAL) 20 MG tablet  30 tablet  1  4/13/2020   --    Sig - Route: Take 1 tablet (20 mg) by mouth daily - Oral      Last office visit note reviewed and summarized below:  Treatment Plan:  Patient Instructions   Start propranolol 20 mg at bedtime for anxiety.     Continue Wellbutrin  mg daily.  You may increase to 450 mg daily if you choose.     Continue buspirone 30 mg twice daily.     Continue fluoxetine 60 mg daily.     Continue lorazepam 0.5 mg daily as needed.  If you do not tolerate propranolol, you may use your Lorazepam for insomnia.     Continue all other medications per your primary care provider.     Continue individual therapy.     Schedule an appointment with me in 4 weeks or sooner as needed.  You may call [facility] at [phone] to schedule, or schedule at the .      Refill x1 pended. Patient will need follow up for further refills.  Patient's requested pharmacy is currently closed.  Patient informed to choose an alternative pharmacy.      Yamilet Leiva, RN   Nurse Liaison  [facility]

## 2020-06-19 ENCOUNTER — MYC REFILL (OUTPATIENT)
Dept: PSYCHOLOGY | Facility: CLINIC | Age: 30
End: 2020-06-19

## 2020-06-19 ENCOUNTER — TELEPHONE (OUTPATIENT)
Dept: BEHAVIORAL HEALTH | Facility: CLINIC | Age: 30
End: 2020-06-19

## 2020-06-19 DIAGNOSIS — F34.1 DYSTHYMIA: ICD-10-CM

## 2020-06-19 RX ORDER — PROPRANOLOL HYDROCHLORIDE 20 MG/1
20 TABLET ORAL DAILY
Qty: 30 TABLET | Refills: 0 | Status: SHIPPED | OUTPATIENT
Start: 2020-06-19 | End: 2020-07-16

## 2020-06-19 RX ORDER — FLUOXETINE 40 MG/1
40 CAPSULE ORAL DAILY
Qty: 30 CAPSULE | Refills: 0 | Status: SHIPPED | OUTPATIENT
Start: 2020-06-19 | End: 2020-07-16

## 2020-06-19 NOTE — TELEPHONE ENCOUNTER
Patient requested fluoxetine 40mg separately from other medications. Will send to pharmacy as previous pharmacy is no longer open.    Yamilet Leiva, RN    Nurse Liaison  [facility]

## 2020-06-19 NOTE — TELEPHONE ENCOUNTER
Informed pharmacy that patient is taking 60mg per day.   Will fill both doses.    Yamilet Leiva, RN    Nurse Liaison  [facility]

## 2020-06-19 NOTE — TELEPHONE ENCOUNTER
Duplicate request.    Yamilet Leiva, RN    Nurse Liaison  [facility]

## 2020-06-19 NOTE — TELEPHONE ENCOUNTER
Pharmacy calling about fluoxetine doses as they have received refill for 20mg and 40mg.     Is this a total of 60mg per day or has the dose been increased to from 20 to 40mg?    [id number] - [address] [phone] (Phone)  [phone] (Fax)     Please call to advise.  [phone]      Thank you!

## 2020-06-19 NOTE — TELEPHONE ENCOUNTER
Patient chose new pharmacy; scripts sent to [facility] at [address] [place] [address].

## 2020-06-25 ENCOUNTER — VIRTUAL VISIT (OUTPATIENT)
Dept: PSYCHOLOGY | Facility: CLINIC | Age: 30
End: 2020-06-25
Payer: COMMERCIAL

## 2020-06-25 DIAGNOSIS — F41.8 DEPRESSION WITH ANXIETY: ICD-10-CM

## 2020-06-25 DIAGNOSIS — F40.10 SOCIAL ANXIETY DISORDER: Primary | ICD-10-CM

## 2020-06-25 DIAGNOSIS — F43.10 PTSD (POST-TRAUMATIC STRESS DISORDER): ICD-10-CM

## 2020-06-25 PROCEDURE — 99214 OFFICE O/P EST MOD 30 MIN: CPT | Mod: 95 | Performed by: PSYCHIATRY & NEUROLOGY

## 2020-06-25 ASSESSMENT — ANXIETY QUESTIONNAIRES
2. NOT BEING ABLE TO STOP OR CONTROL WORRYING: SEVERAL DAYS
GAD7 TOTAL SCORE: 10
IF YOU CHECKED OFF ANY PROBLEMS ON THIS QUESTIONNAIRE, HOW DIFFICULT HAVE THESE PROBLEMS MADE IT FOR YOU TO DO YOUR WORK, TAKE CARE OF THINGS AT HOME, OR GET ALONG WITH OTHER PEOPLE: SOMEWHAT DIFFICULT
3. WORRYING TOO MUCH ABOUT DIFFERENT THINGS: SEVERAL DAYS
5. BEING SO RESTLESS THAT IT IS HARD TO SIT STILL: SEVERAL DAYS
1. FEELING NERVOUS, ANXIOUS, OR ON EDGE: MORE THAN HALF THE DAYS
7. FEELING AFRAID AS IF SOMETHING AWFUL MIGHT HAPPEN: SEVERAL DAYS
6. BECOMING EASILY ANNOYED OR IRRITABLE: MORE THAN HALF THE DAYS

## 2020-06-25 NOTE — PATIENT INSTRUCTIONS
Decrease fluoxetine to 40 mg daily if tolerated.    Continue Wellbutrin  g daily.  Continue buspirone 30 mg twice daily.  Continue lorazepam 0.5 mg daily as needed for anxiety.  Continue propranolol 20 mg at bedtime.    Continue all other medications per your primary care provider.    Continue individual therapy.    Schedule an appointment with me in 6 weeks or sooner as needed.  You may call [facility] at [phone] to schedule, or schedule at the .    [facility] Resources:      Go to the Emergency Department as needed or call after hours crisis line at [phone].      To schedule individual or family therapy, call [facility] at [phone].     Follow up with primary care provider as planned or sooner for acute medical concerns.    Call the psychiatric nurse line with medication questions or concerns at [phone].    Featherlighthart may be used to communicate with your provider, but this is not intended to be used for emergencies.    Community Resources:      National Suicide Prevention Lifeline: 242.806.8340 (TTY: 599.963.8021). Call anytime for help.  (www.suicidepreventionlifeline.org)    National [place] on Mental Illness (www.rosalio.org): 868.810.9383 or 969-446-7150.     Mental Health Association (www.mentalhealth.org): 408.513.2951 or 177-134-2290.    [place] Crisis Text Line: Text [place] to 853030    Suicide LifeLine Chat: suicidepreWishline.org/chat

## 2020-06-25 NOTE — PROGRESS NOTES
"Adam Fish is a 30 year old male who is being evaluated via a billable video visit.      The patient has been notified of following:     \"This video visit will be conducted via a call between you and your physician/provider. We have found that certain health care needs can be provided without the need for an in-person physical exam.  This service lets us provide the care you need with a video conversation.  If a prescription is necessary we can send it directly to your pharmacy.  If lab work is needed we can place an order for that and you can then stop by our lab to have the test done at a later time.    Video visits are billed at different rates depending on your insurance coverage.  Please reach out to your insurance provider with any questions.    If during the course of the call the physician/provider feels a video visit is not appropriate, you will not be charged for this service.\"    Patient has given verbal consent for Video visit? Yes    Will anyone else be joining your video visit? No        Video-Visit Details    Type of service:  Video Visit    Video Start Time: 10:10 AM  Video End Time: 11:06 AM    Originating Location (pt. Location): Home    Distant Location (provider location):  [facility]     Platform used for Video Visit: [facility]          Outpatient Psychiatric Progress Note    Name: Adam Fish   : 1990                    Primary Care Provider: Lulu Cope MD   Therapist: Lexi lorenz EMDR provider    PHQ-9 scores:  PHQ-9 SCORE 3/12/2020 2020 2020   PHQ-9 Total Score - - -   PHQ-9 Total Score 6 8 10       JELANI-7 scores:  JELANI-7 SCORE 3/12/2020 2020 2020   Total Score - - -   Total Score 4 6 10       Patient Identification:  Adam is a 30 year old year old, in a relationship   White American male  who presents for return visit with me.  Patient attended the session alone.     Interim History:  Adam is feeling more " "optimistic and believes his mood is \"trending in the right direction.\"  He rates his mood today as 7.5 out of 10, but still feels like it is up and down more than he would like it to be.  He has a series of appointments set up with his EMDR provider, so is hoping to work hard in July and August.      He continues to have some difficulty with sleep and low energy.  He is also not as interested in his regular activities as he would like to be and reports low motivation at times.  He has been taking Benadryl at night which seems to be helping his sleep and has minimal side effects.  He also feels the propranolol has helped the panic attacks he was having at night.  He has been using lorazepam once or twice a week for anxiety.    He recently ran out of the 20 mg tablets of fluoxetine, and wonders about decreasing the dose to 40 mg a day.  He is in at all sure that the increase was beneficial, but if he notices a deterioration in his mood after decreasing the dose, we can certainly increase it back to 60 mg.    He reports that he has been active, golfing once or twice a week, biking, and \"[place]  He is drinking less than he did in the past, and at about the same level that he reported at his last appointment.    We agreed to not make any changes to his medications at this point, but may need to make some further adjustments in the future.    Vital Signs:   There were no vitals taken for this visit.    Current Medications:  Current Outpatient Medications   Medication     buPROPion (WELLBUTRIN XL) 300 MG 24 hr tablet     busPIRone HCl 30 MG PO tablet     FLUoxetine (PROZAC) 20 MG capsule     FLUoxetine (PROZAC) 40 MG capsule     LORazepam (ATIVAN) 0.5 MG tablet     propranolol (INDERAL) 20 MG tablet     No current facility-administered medications for this visit.       Mental Status Examination:  Adam is a 30-year-old man in no acute distress.  He is neatly groomed in casual clothing.  Speech is clear and " normal in rate and tone.  Eye contact was good over the video connection.  Affect is full.  Mood is fair.  Thoughts are logical and spontaneous with no loose associations or flight of ideas.  Thought content shows no psychosis.  He is alert and oriented x3.    Assessment and Plan:    ICD-10-CM    1. Social anxiety disorder  F40.10    2. Depression with anxiety  F41.8    3. PTSD (post-traumatic stress disorder)  F43.10        Medical comorbidities include:   Patient Active Problem List   Diagnosis     CARDIOVASCULAR SCREENING; LDL GOAL LESS THAN 160     Impingement syndrome, shoulder     Depression with anxiety     Elevated BP without diagnosis of hypertension     Chronic right-sided low back pain with right-sided sciatica     Dysthymia     Kyphosis (acquired) (postural)     Poor posture     Thoracic segment dysfunction     Sacral pain     Somatic dysfunction of sacral region     Somatic dysfunction of lumbar region     Paresthesias     Generalized anxiety disorder     Alcohol abuse     Social anxiety disorder     PTSD (post-traumatic stress disorder)       Treatment Plan:  Patient Instructions   Decrease fluoxetine to 40 mg daily if tolerated.    Continue Wellbutrin  g daily.  Continue buspirone 30 mg twice daily.  Continue lorazepam 0.5 mg daily as needed for anxiety.  Continue propranolol 20 mg at bedtime.    Continue all other medications per your primary care provider.    Continue individual therapy.    Schedule an appointment with me in 6 weeks or sooner as needed.  You may call [facility] at [phone] to schedule, or schedule at the .    [facility] Resources:      Go to the Emergency Department as needed or call after hours crisis line at [phone].      To schedule individual or family therapy, call [facility] at [phone].     Follow up with primary care provider as planned or sooner for acute medical concerns.    Call the psychiatric nurse line with  medication questions or concerns at [phone].    MyChart may be used to communicate with your provider, but this is not intended to be used for emergencies.    Community Resources:      National Suicide Prevention Lifeline: 587.379.9719 (TTY: 165.849.9210). Call anytime for help.  (www.suicidepreventionlifeline.org)    National [place] on Mental Illness (www.rosalio.org): 168.122.7268 or 978-489-1578.     Mental Health Association (www.mentalhealth.org): 419.753.2417 or 612-271-8430.    [place] Crisis Text Line: Text [place] to 322974    Suicide LifeLine Chat: suicidepreWorld Wide Premium Packersline.org/chat     Administrative Billing:   I spent approximately 56 minutes with Adam, greater than 50% spent in counseling regarding alternative medications and treatments in the event that therapy is not enough in combination with his current meds.    Patient Status:  Patient will continue to be seen for ongoing consultation and stabilization.

## 2020-06-26 ENCOUNTER — THERAPY VISIT (OUTPATIENT)
Dept: PHYSICAL THERAPY | Facility: CLINIC | Age: 30
End: 2020-06-26
Payer: COMMERCIAL

## 2020-06-26 DIAGNOSIS — G89.29 CHRONIC RIGHT SHOULDER PAIN: ICD-10-CM

## 2020-06-26 DIAGNOSIS — Z47.89 AFTERCARE FOLLOWING SURGERY OF THE MUSCULOSKELETAL SYSTEM: ICD-10-CM

## 2020-06-26 DIAGNOSIS — M25.511 CHRONIC RIGHT SHOULDER PAIN: ICD-10-CM

## 2020-06-26 PROCEDURE — 97161 PT EVAL LOW COMPLEX 20 MIN: CPT | Mod: GP | Performed by: PHYSICAL THERAPIST

## 2020-06-26 PROCEDURE — 97110 THERAPEUTIC EXERCISES: CPT | Mod: GP | Performed by: PHYSICAL THERAPIST

## 2020-06-26 ASSESSMENT — ANXIETY QUESTIONNAIRES: GAD7 TOTAL SCORE: 10

## 2020-06-26 NOTE — PROGRESS NOTES
Physical Therapy Initial Evaluation  June 26, 2020     MD Instructions/Precautions/Restrictions: PT eval and treat.     Therapist Impression:   Adam Fish presents with findings consistent with R shoulder labral tear, with related impairments limiting his ability to lift, reach, participate in recreational activities. Skilled PT services are necessary in order to reduce impairments and improve independent function.     Subjective:   C/C: Originally had a Bankart repair in August of 2018, then in February of this year had a hyperabduction injury while snowboarding and upset shoulder again (pt is RHD). Had an MRA (see below), with lidocaine/bupivicaine that did not relieve symptoms. Upon reviewing MRA results with his surgeon, was found to have a new inferior labral tear, plan to trial non-surgical management first. Denies c/o instability, clinical exam by surgeon found to have adequate shoulder stability.   3/6/20 MRA with gadolinium as well as intraarticular injection of lidocaine/bupivicaine  1. Post-surgical changes related to labral repair. There is an inferior labral re-tear.  2. Anteroinferior and posteroinferior labral fraying and/or chondromalacia.  3. Mild supraspinatus and infraspinatus tendinosis.  4. Small remote Hill-Sachs impaction lesion.  Trace fluid/inflammatory changes in the subacromial subdeltoid bursa.       Answers for HPI/ROS submitted by the patient on 6/25/2020   History Reported by Patient  Reason for Visit:: Right shoulder labrum tear  When problem began:: 2/1/2020  How problem occurred:: Snowboarding  Number scale: 1/10  General health as reported by patient: good  Please check all that apply to your current or past medical history: depression  Medical allergies: none  Surgeries: other  Other Surgery Detail: Orthopedic surgery  Medications you are currently taking: anti-depressants  What are your primary job tasks: computer work, prolonged sitting      Objective:  SHOULDER  EXAMINATION    STATIC POSTURE  Forward head on neck, Increased thoracic kyphosis and Rounded shoulders (all mild)    SHOULDER RANGE OF MOTION & STRENGTH  (*Indicates patient s pain)   AROM L AROM R HHD L HHD R   Flex 180 180*      180* 16 20   ER 85 85 25 20   IR   41 30   Ext/IR T6 T6     ER/IR Ratio:  1.6 on L, 1.5 on R    JOINT MOBILITY  WNL all directions    SPECIAL TESTS   Not assessed      Assessment/Plan:    The patient is a 30 year old male with chief complaint of R shoulder pain.    The patient has the following significant findings with corresponding treatment plan.  Diagnosis 1:  R shoulder pain, new labral injury, 2 years s/p Bankart repair    Pain -  hot/cold therapy, manual therapy, splint/taping/bracing/orthotics and self management  Decreased ROM/flexibility - manual therapy, therapeutic exercise and home program  Decreased joint mobility - manual therapy, therapeutic exercise and home program  Decreased strength - therapeutic exercise, therapeutic activities and home program  Decreased proprioception - neuro re-education and therapeutic activities  Impaired muscle performance - neuro re-education and home program  Decreased function - therapeutic activities and home program  Impaired posture - neuro re-education, therapeutic activities and home program      Therapy Evaluation Codes:   1) History comprised of:   Personal factors that impact the plan of care:      Please refer to health history in EMR.    Comorbidity factors that impact the plan of care are:      Please refer to health history in EMR.     Medications impacting care: None.  2) Examination of Body Systems comprised of:   Body structures and functions that impact the plan of care:      Shoulder.   Activity limitations that impact the plan of care are:      lifting, reaching, sports.   Clinical presentation characteristics are:    Stable/Uncomplicated.  3) Presentation comprised of:   Presentation scored as Low complexity with  uncomplicated characteristics..  4) Decision-Making    Low complexity using standardized patient assessment instrument and/or measureable assessment of functional outcome.  Cumulative Therapy Evaluation is: Low complexity.    Previous and current functional limitations:  (See Goal Flow Sheet for this information)    Short term and Long term goals: (See Goal Flow Sheet for this information)     Communication ability:  Patient appears to be able to clearly communicate and understand verbal and written communication and follow directions correctly.  Treatment Explanation - The following has been discussed with the patient: RX ordered/plan of care, anticipated outcomes, and possible risks and side effects.  This patient would benefit from PT intervention to resume normal activities.   Rehab potential is good.    Frequency:  1 X week, once daily  Duration:  for 4 weeks tapering to 2x/month for 2 months  Discharge Plan: Achieve all LTGs, be independent in home treatment program, and reach maximal therapeutic benefit.    Please refer to the daily flowsheet for treatment today, total treatment time and time spent performing 1:1 timed codes.

## 2020-07-16 DIAGNOSIS — F34.1 DYSTHYMIA: ICD-10-CM

## 2020-07-16 DIAGNOSIS — F41.1 GENERALIZED ANXIETY DISORDER: ICD-10-CM

## 2020-07-16 RX ORDER — LORAZEPAM 0.5 MG/1
0.5 TABLET ORAL DAILY PRN
Qty: 30 TABLET | Refills: 5 | Status: SHIPPED | OUTPATIENT
Start: 2020-07-16 | End: 2021-03-25

## 2020-07-16 RX ORDER — PROPRANOLOL HYDROCHLORIDE 20 MG/1
20 TABLET ORAL DAILY
Qty: 30 TABLET | Refills: 5 | Status: SHIPPED | OUTPATIENT
Start: 2020-07-16 | End: 2020-08-06

## 2020-07-16 RX ORDER — FLUOXETINE 40 MG/1
40 CAPSULE ORAL DAILY
Qty: 30 CAPSULE | Refills: 5 | Status: SHIPPED | OUTPATIENT
Start: 2020-07-16 | End: 2020-08-06

## 2020-07-16 NOTE — TELEPHONE ENCOUNTER
Spoke with patient, he reports that he tried to decrease his fluoxetine to 40 mg daily, but was unsuccessful and is currently taking 60 mg daily. He is wondering if his Bupropion could be increased by 150 mg daily as previously discussed. Not noted in treatment plan.     Routing to provider. Bupropion not pended.     Refill for: Fluoxetine 40 mg;fluoxetine 20 mg; Lorazepam 0.5 mg; propranolol 20 mg    Last Appointment: 6/25/20    Next Appointment: 8/6/20    No Shows/Cancellations since last appointment: none    Last Refill in Epic (date and amount/how many days):   FLUoxetine (PROZAC) 20 MG capsule  30 capsule  0  6/19/2020   No    Sig - Route: Take 1 capsule (20 mg) by mouth daily - Oral    Sent to pharmacy as: FLUoxetine HCl 20 MG Oral Capsule (PROzac)    Class: E-Prescribe    Order: [id number]    E-Prescribing Status: Receipt confirmed by pharmacy      FLUoxetine (PROZAC) 40 MG capsule  30 capsule  0  6/19/2020   No    Sig - Route: Take 1 capsule (40 mg) by mouth daily - Oral    Sent to pharmacy as: FLUoxetine HCl 40 MG Oral Capsule (PROzac)    Class: E-Prescribe    Notes to Pharmacy: Patient will need to be seen for any further refills.    Order: [id number]      propranolol (INDERAL) 20 MG tablet  30 tablet  0  6/19/2020   No    Sig - Route: Take 1 tablet (20 mg) by mouth daily - Oral    Sent to pharmacy as: Propranolol HCl 20 MG Oral Tablet (INDERAL)    Class: E-Prescribe    Notes to Pharmacy: Patient will need to be seen for any further refills.      LORazepam (ATIVAN) 0.5 MG tablet  30 tablet  0  4/8/2020   No    Sig - Route: Take 1 tablet (0.5 mg) by mouth daily as needed for anxiety - Oral    Sent to pharmacy as: LORazepam (ATIVAN) 0.5 MG tablet    Class: E-Prescribe    Order: [id number]    E-Prescribing Status: Receipt confirmed by pharmacy (4/8/2020  9:51 AM CDT)      Last office visit note reviewed and summarized below:  Patient Instructions 6/25/20   Decrease fluoxetine to 40 mg daily if  tolerated.     Continue Wellbutrin  g daily.  Continue buspirone 30 mg twice daily.  Continue lorazepam 0.5 mg daily as needed for anxiety.  Continue propranolol 20 mg at bedtime.     Continue all other medications per your primary care provider.     Continue individual therapy.     :      Routing to provider for review.      Zuly Seth RN   Nurse Liaison  [facility]

## 2020-07-23 ENCOUNTER — THERAPY VISIT (OUTPATIENT)
Dept: PHYSICAL THERAPY | Facility: CLINIC | Age: 30
End: 2020-07-23
Payer: COMMERCIAL

## 2020-07-23 DIAGNOSIS — M25.511 CHRONIC RIGHT SHOULDER PAIN: ICD-10-CM

## 2020-07-23 DIAGNOSIS — Z47.89 AFTERCARE FOLLOWING SURGERY OF THE MUSCULOSKELETAL SYSTEM: ICD-10-CM

## 2020-07-23 DIAGNOSIS — G89.29 CHRONIC RIGHT SHOULDER PAIN: ICD-10-CM

## 2020-07-23 PROCEDURE — 97112 NEUROMUSCULAR REEDUCATION: CPT | Mod: GP | Performed by: PHYSICAL THERAPIST

## 2020-07-23 PROCEDURE — 97110 THERAPEUTIC EXERCISES: CPT | Mod: GP | Performed by: PHYSICAL THERAPIST

## 2020-08-06 ENCOUNTER — VIRTUAL VISIT (OUTPATIENT)
Dept: PSYCHOLOGY | Facility: CLINIC | Age: 30
End: 2020-08-06
Payer: COMMERCIAL

## 2020-08-06 DIAGNOSIS — F34.1 DYSTHYMIA: ICD-10-CM

## 2020-08-06 DIAGNOSIS — F41.8 DEPRESSION WITH ANXIETY: ICD-10-CM

## 2020-08-06 DIAGNOSIS — F41.1 GENERALIZED ANXIETY DISORDER: ICD-10-CM

## 2020-08-06 PROCEDURE — 99214 OFFICE O/P EST MOD 30 MIN: CPT | Mod: 95 | Performed by: PSYCHIATRY & NEUROLOGY

## 2020-08-06 PROCEDURE — 90833 PSYTX W PT W E/M 30 MIN: CPT | Mod: 95 | Performed by: PSYCHIATRY & NEUROLOGY

## 2020-08-06 RX ORDER — BUPROPION HYDROCHLORIDE 300 MG/1
300 TABLET ORAL EVERY MORNING
Qty: 90 TABLET | Refills: 1 | Status: SHIPPED | OUTPATIENT
Start: 2020-08-06 | End: 2021-04-14

## 2020-08-06 RX ORDER — FLUOXETINE 40 MG/1
40 CAPSULE ORAL DAILY
Qty: 90 CAPSULE | Refills: 1 | Status: SHIPPED | OUTPATIENT
Start: 2020-08-06 | End: 2021-04-14

## 2020-08-06 RX ORDER — PROPRANOLOL HYDROCHLORIDE 20 MG/1
20 TABLET ORAL DAILY
Qty: 90 TABLET | Refills: 1 | Status: SHIPPED | OUTPATIENT
Start: 2020-08-06 | End: 2021-04-14

## 2020-08-06 RX ORDER — BUSPIRONE HYDROCHLORIDE 30 MG/1
30 TABLET ORAL 2 TIMES DAILY
Qty: 180 TABLET | Refills: 1 | Status: SHIPPED | OUTPATIENT
Start: 2020-08-06 | End: 2021-04-14

## 2020-08-06 RX ORDER — BUPROPION HYDROCHLORIDE 150 MG/1
150 TABLET ORAL EVERY MORNING
Qty: 30 TABLET | Refills: 2 | Status: SHIPPED | OUTPATIENT
Start: 2020-08-06 | End: 2021-07-21 | Stop reason: DRUGHIGH

## 2020-08-06 ASSESSMENT — PATIENT HEALTH QUESTIONNAIRE - PHQ9
SUM OF ALL RESPONSES TO PHQ QUESTIONS 1-9: 9
5. POOR APPETITE OR OVEREATING: SEVERAL DAYS

## 2020-08-06 ASSESSMENT — ANXIETY QUESTIONNAIRES
5. BEING SO RESTLESS THAT IT IS HARD TO SIT STILL: SEVERAL DAYS
3. WORRYING TOO MUCH ABOUT DIFFERENT THINGS: SEVERAL DAYS
GAD7 TOTAL SCORE: 8
2. NOT BEING ABLE TO STOP OR CONTROL WORRYING: SEVERAL DAYS
6. BECOMING EASILY ANNOYED OR IRRITABLE: SEVERAL DAYS
IF YOU CHECKED OFF ANY PROBLEMS ON THIS QUESTIONNAIRE, HOW DIFFICULT HAVE THESE PROBLEMS MADE IT FOR YOU TO DO YOUR WORK, TAKE CARE OF THINGS AT HOME, OR GET ALONG WITH OTHER PEOPLE: SOMEWHAT DIFFICULT
1. FEELING NERVOUS, ANXIOUS, OR ON EDGE: MORE THAN HALF THE DAYS
7. FEELING AFRAID AS IF SOMETHING AWFUL MIGHT HAPPEN: SEVERAL DAYS

## 2020-08-06 NOTE — PATIENT INSTRUCTIONS
Increase bupropion XL to 450 mg daily if tolerated.  If you have adverse side effects, decrease the dose back to 300 mg daily.    Try using lorazepam 0.5 mg at bedtime to help with sleep.  DO NOT combine with alcohol!    Continue buspirone 30 mg twice daily.    Continue propranolol 20 mg at bedtime.    Continue fluoxetine 60 mg daily.    Continue all other medications per your primary care provider.    Schedule an appointment with me in 6 weeks or sooner as needed.  You may call [facility] at [phone] to schedule, or schedule at the .    [place] [facility]:      Go to the Emergency Department as needed or call after hours crisis line at [phone].      To schedule individual or family therapy, call [facility] at [phone].     Follow up with primary care provider as planned or sooner for acute medical concerns.    Call the psychiatric nurse line with medication questions or concerns at [phone].    [employer] may be used to communicate with your provider, but this is not intended to be used for emergencies.    Community Resources:      National Suicide Prevention Lifeline: 520.188.5721 (TTY: 315.240.8122). Call anytime for help.  (www.suicidepreventionlifeline.org)    National [place] on Mental Illness (www.rosalio.org): 315.226.8204 or 096-709-4066.     Mental Health Association (www.mentalhealth.org): 593.495.7409 or 882-781-1061.    [place] Crisis Text Line: Text [place] to 429736    Suicide LifeLine Chat: suicidepreHappifyline.org/chat

## 2020-08-06 NOTE — PROGRESS NOTES
"Adam Fish is a 30 year old male who is being evaluated via a billable video visit.      The patient has been notified of following:     \"This video visit will be conducted via a call between you and your physician/provider. We have found that certain health care needs can be provided without the need for an in-person physical exam.  This service lets us provide the care you need with a video conversation.  If a prescription is necessary we can send it directly to your pharmacy.  If lab work is needed we can place an order for that and you can then stop by our lab to have the test done at a later time.    Video visits are billed at different rates depending on your insurance coverage.  Please reach out to your insurance provider with any questions.    If during the course of the call the physician/provider feels a video visit is not appropriate, you will not be charged for this service.\"    Patient has given verbal consent for Video visit? Yes  How would you like to obtain your AVS? MyChart  If you are dropped from the video visit, the video invite should be resent to: Patient will be using [employer] but send to e-mail at: [email or web address]  Will anyone else be joining your video visit? No        Video-Visit Details    Type of service:  Video Visit    Video Start Time: 11:20 AM  Video End Time: 12 PM    Originating Location (pt. Location): Home    Distant Location (provider location):  [facility]     Platform used for Video Visit: [facility]          Outpatient Psychiatric Progress Note    Name: Adam Fish   : 1990                    Primary Care Provider: Lulu Cope MD   Therapist: Lexi Han and an EMDR provider     PHQ-9 scores:  PHQ-9 SCORE 2020   PHQ-9 Total Score - - -   PHQ-9 Total Score 8 10 9       JELANI-7 scores:  JELANI-7 SCORE 2020   Total Score - - -   Total Score 6 10 8       Patient " Identification:  Adam is a 30 year old year old, partnered / significant other  White American male  who presents for return visit with me.  Patient attended the session alone.     Interim History:  Adam reports that he had been doing pretty well, but then recently was fairly depressed for about 4 days.  He saw his therapist yesterday, which seems to have helped.  He also had a good conversation with his brother that was helpful.  He still has quite a lot of anxiety, and reports that he has an underlying feeling of inadequacy.  His father is , and he feels like his parents are always perfect, and he does not feel like he has lived up to that example.    We reviewed the medications that we have tried today.  He is currently on 300 mg a day of bupropion.  He has been on up to 450 mg in the past, but is not sure if it increased his anxiety.  He was not on any other medications concurrently when he was on the higher dose.  We agreed that he would try increasing it back to 450 mg again.    He is currently taking the maximum dose of buspirone, and has not really complained of any adverse effects with it.  He is taking propranolol before bed, which has alleviated his nighttime panic attacks.  He usually takes Benadryl at bedtime, and sleeps between 6 and 8 hours a night, but feels very draggy in the morning.  I am suspicious that he might be having some hangover effect from the Benadryl.  He is currently taking lorazepam only once a week or so, and usually not at night.  He does take it on Sunday nights because of anxiety about the work week starting.  He says that he is drinking about twice a week now.  He goes golfing on Wednesdays and drinks 3-6 beers over several hours.  He also usually drinks once per weekend.  He has experienced the effect of lorazepam in combination with alcohol, and definitely recognizes that it is not a combination he wants to try again.  He certainly can try using lorazepam before bed  "rather than Benadryl.  He will avoid it on nights that he has used alcohol.  He has already tried trazodone which was overly sedating and melatonin, which was not very effective.    We discussed the possibility that mirtazapine would be helpful for his sleep, but agreed to not add it at this point.  In addition, because he is on the maximum recommended dosages of bupropion, fluoxetine, and buspirone, we may want to consider changing medications if he continues to be anxious and depressed.    He has found therapy to be very beneficial and seems to be gaining some insight.  He talks about \"putting the pieces of the puzzle together.\"  We did do a brief cognitive behavior intervention.  He recognizes that he has very high expectations of himself, and frequently compares himself to other family members.  On the right side, he is also realized that he is okay as he is and does not really want to make a lot of changes.    Vital Signs:   There were no vitals taken for this visit.    Current Medications:  Current Outpatient Medications   Medication     buPROPion (WELLBUTRIN XL) 150 MG 24 hr tablet     buPROPion (WELLBUTRIN XL) 300 MG 24 hr tablet     busPIRone HCl (BUSPAR) 30 MG tablet     FLUoxetine (PROZAC) 20 MG capsule     FLUoxetine (PROZAC) 40 MG capsule     LORazepam (ATIVAN) 0.5 MG tablet     propranolol (INDERAL) 20 MG tablet     No current facility-administered medications for this visit.         Mental Status Examination:  Adam is a 30-year-old man in no acute distress.  He is neatly groomed in casual clothing.  Speech is clear and normal in rate and tone.  Eye contact is good over the video connection.  Affect is somewhat blunted.  Mood is mildly dysphoric and anxious.  Thoughts are logical and spontaneous with no loose associations or flight of ideas.  Thought content shows no psychosis.  He denies suicidal thoughts.  He is alert and oriented x3.    Assessment and Plan:    ICD-10-CM    1. Depression with " anxiety  F41.8 buPROPion (WELLBUTRIN XL) 300 MG 24 hr tablet   2. Generalized anxiety disorder  F41.1 busPIRone HCl (BUSPAR) 30 MG tablet     FLUoxetine (PROZAC) 40 MG capsule     propranolol (INDERAL) 20 MG tablet   3. Dysthymia  F34.1 buPROPion (WELLBUTRIN XL) 150 MG 24 hr tablet     FLUoxetine (PROZAC) 20 MG capsule       Medical comorbidities include:   Patient Active Problem List   Diagnosis     CARDIOVASCULAR SCREENING; LDL GOAL LESS THAN 160     Impingement syndrome, shoulder     Depression with anxiety     Elevated BP without diagnosis of hypertension     Chronic right-sided low back pain with right-sided sciatica     Dysthymia     Kyphosis (acquired) (postural)     Poor posture     Thoracic segment dysfunction     Sacral pain     Somatic dysfunction of sacral region     Somatic dysfunction of lumbar region     Paresthesias     Generalized anxiety disorder     Alcohol abuse     Social anxiety disorder     PTSD (post-traumatic stress disorder)     Chronic right shoulder pain     Aftercare following surgery of the musculoskeletal system       Treatment Plan:  Patient Instructions   Increase bupropion XL to 450 mg daily if tolerated.  If you have adverse side effects, decrease the dose back to 300 mg daily.    Try using lorazepam 0.5 mg at bedtime to help with sleep.  DO NOT combine with alcohol!    Continue buspirone 30 mg twice daily.    Continue propranolol 20 mg at bedtime.    Continue fluoxetine 60 mg daily.    Continue all other medications per your primary care provider.    Schedule an appointment with me in 6 weeks or sooner as needed.  You may call [facility] at [phone] to schedule, or schedule at the .    [place] [facility]:      Go to the Emergency Department as needed or call after hours crisis line at [phone].      To schedule individual or family therapy, call [facility] at [phone].     Follow up with primary care provider as planned or  sooner for acute medical concerns.    Call the psychiatric nurse line with medication questions or concerns at [phone].    [employer] may be used to communicate with your provider, but this is not intended to be used for emergencies.    Community Resources:      National Suicide Prevention Lifeline: 678.887.4591 (TTY: 726.549.5247). Call anytime for help.  (www.suicidepreventionlifeline.org)    National [place] on Mental Illness (www.rosalio.org): 939.308.7053 or 490-451-8065.     Mental Health Association (www.mentalhealth.org): 496.818.9371 or 035-120-5058.    [place] Crisis Text Line: Text [place] to 096110    Suicide LifeLine Chat: suicideSaleMoveline.org/chat    Administrative Billing:   I spent 40 minutes with this patient, greater than 16 minutes in supportive psychotherapy.    Patient Status:  Patient will continue to be seen for ongoing consultation and stabilization.

## 2020-08-07 ASSESSMENT — ANXIETY QUESTIONNAIRES: GAD7 TOTAL SCORE: 8

## 2020-08-11 ENCOUNTER — THERAPY VISIT (OUTPATIENT)
Dept: PHYSICAL THERAPY | Facility: CLINIC | Age: 30
End: 2020-08-11
Payer: COMMERCIAL

## 2020-08-11 DIAGNOSIS — M25.511 CHRONIC RIGHT SHOULDER PAIN: ICD-10-CM

## 2020-08-11 DIAGNOSIS — G89.29 CHRONIC RIGHT SHOULDER PAIN: ICD-10-CM

## 2020-08-11 DIAGNOSIS — Z47.89 AFTERCARE FOLLOWING SURGERY OF THE MUSCULOSKELETAL SYSTEM: ICD-10-CM

## 2020-08-11 PROCEDURE — 97110 THERAPEUTIC EXERCISES: CPT | Mod: GP | Performed by: PHYSICAL THERAPIST

## 2020-08-11 PROCEDURE — 97112 NEUROMUSCULAR REEDUCATION: CPT | Mod: GP | Performed by: PHYSICAL THERAPIST

## 2020-08-18 DIAGNOSIS — F41.1 GENERALIZED ANXIETY DISORDER: ICD-10-CM

## 2020-08-18 RX ORDER — LORAZEPAM 0.5 MG/1
0.5 TABLET ORAL DAILY PRN
Qty: 30 TABLET | Refills: 5 | Status: CANCELLED | OUTPATIENT
Start: 2020-08-18

## 2020-08-19 NOTE — TELEPHONE ENCOUNTER
Requesting pharmacy different from where Rx was originally sent.     Writer attempted to contact patient to confirm that he did want Rx transferred-- no answer, LVM to call clinic back  If patient calls back, please confirm if he wants it transferred.    Patient is active on MyChart-- did also inform in VM that message would be sent with details.      Yamilet Leiva, RN    Nurse Liaison  [facility]

## 2020-08-20 NOTE — TELEPHONE ENCOUNTER
Writer attempted to contact patient-- no answer, LVM to call clinic back  If patient calls back, please ask if he would like Rx transferred.    Yamilet Leiva, RN    Nurse Liaison  [facility]

## 2020-08-21 NOTE — TELEPHONE ENCOUNTER
Writer attempted to contact patient-- no answer, LVM to call clinic back. This is the 3rd and final attempt.  If patient calls back, please confirm if patient wants Rx transferred.    Yamilet Leiva, HAROON    Nurse Liaison  [facility]

## 2020-09-10 ENCOUNTER — MYC MEDICAL ADVICE (OUTPATIENT)
Dept: FAMILY MEDICINE | Facility: CLINIC | Age: 30
End: 2020-09-10

## 2021-01-05 ENCOUNTER — THERAPY VISIT (OUTPATIENT)
Dept: PHYSICAL THERAPY | Facility: CLINIC | Age: 31
End: 2021-01-05
Payer: COMMERCIAL

## 2021-01-05 DIAGNOSIS — M25.511 CHRONIC RIGHT SHOULDER PAIN: ICD-10-CM

## 2021-01-05 DIAGNOSIS — Z47.89 AFTERCARE FOLLOWING SURGERY OF THE MUSCULOSKELETAL SYSTEM: ICD-10-CM

## 2021-01-05 DIAGNOSIS — G89.29 CHRONIC RIGHT SHOULDER PAIN: ICD-10-CM

## 2021-01-05 PROCEDURE — 97112 NEUROMUSCULAR REEDUCATION: CPT | Mod: GP | Performed by: PHYSICAL THERAPIST

## 2021-01-05 PROCEDURE — 97530 THERAPEUTIC ACTIVITIES: CPT | Mod: GP | Performed by: PHYSICAL THERAPIST

## 2021-01-05 PROCEDURE — 97110 THERAPEUTIC EXERCISES: CPT | Mod: GP | Performed by: PHYSICAL THERAPIST

## 2021-01-05 NOTE — PROGRESS NOTES
Progress Note    Progress reporting period is from initial eval to Jan 5, 2021.     Patient seen for Rxs Used: 4 visits.    SUBJECTIVE  Overall has been doing really well. 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 Wants to update his home program in order to set the foundation for being able to participate this spring. Has not had any c/o shoulder instability, only occasional achy pain in lateral shoulder if he overexerts with workouts.   Changes in function:  Yes (See Goal flowsheet attached for changes in current functional level)  Adverse reaction to treatment or activity: None    OBJECTIVE  Full painfree ROM.   Strength 5/5 all planes on MMT.     Strength Testing: Handheld Dynamometry     (lbs force)   Left Right % Return   ER 36 30 83%   IR 65 54 83%   ABD 19 22 116%     ER/IR Ratio:   Right: 1.8  Left: 1.8      ASSESSMENT/PLAN  Diagnosis: R shoulder pain, new labral injury, 2 years s/p Bankart repair   DIAGP:  Diagnoses of Chronic right shoulder pain and Aftercare following surgery of the musculoskeletal system were pertinent to this visit.  Updated problem list and treatment plan:   Diagnosis 1:  R shoulder pain, new labral injury, 2 years s/p Bankart repair     Decreased strength - therapeutic exercise and therapeutic activities  Impaired muscle performance - neuro re-education    STG/LTGs have been met or progress has been made towards goals:  Yes, please see goal flowsheet for most current information.    Assessment of Progress: The patient's condition is improving.  The patient's condition has potential to improve.  Self Management Plans:  Patient is independent in a home treatment program.  I have re-evaluated this patient and find that the nature, scope, duration and intensity of the therapy is appropriate for the medical condition of the patient.  Adam continues to require the following intervention to meet STG and LTG's:  PT.    Recommendations:  This  patient would benefit from continued therapy.     Frequency:  1 X a month, once daily  Duration:  for 2 months      Balaji Mcdaniels, PT, DPT, OCS    Please refer to the daily flowsheet for treatment today, total treatment time and time spent performing 1:1 timed codes.

## 2021-01-15 ENCOUNTER — HEALTH MAINTENANCE LETTER (OUTPATIENT)
Age: 31
End: 2021-01-15

## 2021-03-11 ENCOUNTER — THERAPY VISIT (OUTPATIENT)
Dept: PHYSICAL THERAPY | Facility: CLINIC | Age: 31
End: 2021-03-11
Payer: COMMERCIAL

## 2021-03-11 DIAGNOSIS — G89.29 CHRONIC RIGHT SHOULDER PAIN: ICD-10-CM

## 2021-03-11 DIAGNOSIS — M25.511 CHRONIC RIGHT SHOULDER PAIN: ICD-10-CM

## 2021-03-11 DIAGNOSIS — Z47.89 AFTERCARE FOLLOWING SURGERY OF THE MUSCULOSKELETAL SYSTEM: ICD-10-CM

## 2021-03-11 PROCEDURE — 97110 THERAPEUTIC EXERCISES: CPT | Mod: GP | Performed by: PHYSICAL THERAPIST

## 2021-03-11 PROCEDURE — 97530 THERAPEUTIC ACTIVITIES: CPT | Mod: GP | Performed by: PHYSICAL THERAPIST

## 2021-03-24 ENCOUNTER — TELEPHONE (OUTPATIENT)
Dept: BEHAVIORAL HEALTH | Facility: CLINIC | Age: 31
End: 2021-03-24

## 2021-03-24 DIAGNOSIS — F41.1 GENERALIZED ANXIETY DISORDER: ICD-10-CM

## 2021-03-24 NOTE — TELEPHONE ENCOUNTER
Reason for call:  Medication     If this is a refill request, has the caller requested the refill from the pharmacy already? Yes     Will the patient be using a [facility]? No     Name of the pharmacy and phone number for the current request:   [facility][id number] - [address]  [phone]    Name of the medication requested:   LORazepam (ATIVAN) 0.5 MG tablet    Other request: Pt states that they have been out of this med for one week and pharmacy states that their refill request was denied. Writer reflected that patient has missed last 2 appointments and hasn't been seen since August. Pt scheduled a follow-up appointment on 4/15/2021.     Phone number to reach patient:  Home number on file [phone] (home)    Best Time:  ASAP    Can we leave a detailed message on this number?  YES    Travel screening: Not Applicable

## 2021-03-25 RX ORDER — LORAZEPAM 0.5 MG/1
0.5 TABLET ORAL DAILY PRN
Qty: 15 TABLET | Refills: 0 | Status: SHIPPED | OUTPATIENT
Start: 2021-03-25 | End: 2021-04-14

## 2021-03-25 NOTE — TELEPHONE ENCOUNTER
Spoke to pt. Informed him it has been over seven months since he last saw provider. Per their visit note in 8/2020, pt was to follow up in six weeks. Refilled denied at this time. Pt scheduled with provider for 4/14/21.

## 2021-03-25 NOTE — TELEPHONE ENCOUNTER
Hi, Jennifer,    I sent in a script for lorazepam #15 to hold him over until his appointment.  Will you please let him know?  Thanks,    Odette Azevedo MD  [facility]  [facility]

## 2021-04-08 ENCOUNTER — IMMUNIZATION (OUTPATIENT)
Dept: NURSING | Facility: CLINIC | Age: 31
End: 2021-04-08
Payer: COMMERCIAL

## 2021-04-08 PROCEDURE — 91300 PR COVID VAC PFIZER DIL RECON 30 MCG/0.3 ML IM: CPT

## 2021-04-08 PROCEDURE — 0001A PR COVID VAC PFIZER DIL RECON 30 MCG/0.3 ML IM: CPT

## 2021-04-14 ENCOUNTER — VIRTUAL VISIT (OUTPATIENT)
Dept: PSYCHIATRY | Facility: CLINIC | Age: 31
End: 2021-04-14
Payer: COMMERCIAL

## 2021-04-14 DIAGNOSIS — F10.10 ALCOHOL ABUSE: ICD-10-CM

## 2021-04-14 DIAGNOSIS — F43.10 PTSD (POST-TRAUMATIC STRESS DISORDER): ICD-10-CM

## 2021-04-14 DIAGNOSIS — F34.1 DYSTHYMIA: Primary | ICD-10-CM

## 2021-04-14 DIAGNOSIS — F41.1 GENERALIZED ANXIETY DISORDER: ICD-10-CM

## 2021-04-14 PROCEDURE — 99214 OFFICE O/P EST MOD 30 MIN: CPT | Mod: 95 | Performed by: PSYCHIATRY & NEUROLOGY

## 2021-04-14 RX ORDER — PROPRANOLOL HYDROCHLORIDE 20 MG/1
20 TABLET ORAL DAILY
Qty: 90 TABLET | Refills: 1 | Status: SHIPPED | OUTPATIENT
Start: 2021-04-14 | End: 2021-11-30

## 2021-04-14 RX ORDER — FLUOXETINE 40 MG/1
40 CAPSULE ORAL DAILY
Qty: 90 CAPSULE | Refills: 1 | Status: SHIPPED | OUTPATIENT
Start: 2021-04-14 | End: 2021-11-30

## 2021-04-14 RX ORDER — LORAZEPAM 0.5 MG/1
0.5 TABLET ORAL DAILY PRN
Qty: 90 TABLET | Refills: 1 | Status: SHIPPED | OUTPATIENT
Start: 2021-04-14 | End: 2021-11-30

## 2021-04-14 RX ORDER — BUPROPION HYDROCHLORIDE 300 MG/1
300 TABLET ORAL EVERY MORNING
Qty: 90 TABLET | Refills: 1 | Status: SHIPPED | OUTPATIENT
Start: 2021-04-14 | End: 2021-11-18

## 2021-04-14 RX ORDER — BUSPIRONE HYDROCHLORIDE 30 MG/1
30 TABLET ORAL 2 TIMES DAILY
Qty: 180 TABLET | Refills: 1 | Status: SHIPPED | OUTPATIENT
Start: 2021-04-14 | End: 2021-11-18

## 2021-04-14 NOTE — PROGRESS NOTES
Telemedicine Visit: The patient's condition can be safely assessed and treated via synchronous audio and visual telemedicine encounter.      Reason for Telemedicine Visit: Services only offered telehealth    Originating Site (Patient Location): Patient's home    Distant Site (Provider Location): Provider Remote Setting    Consent:  The patient/guardian has verbally consented to: the potential risks and benefits of telemedicine (video visit) versus in person care; bill my insurance or make self-payment for services provided; and responsibility for payment of non-covered services.     Mode of Communication:  Video Conference via [employer]    Patient is in the [facility] waiting room.  Patient # [phone]        Outpatient Psychiatric Progress Note    Name: Adam Fish   : 1990                    Primary Care Provider: Lulu Cope MD   Therapist: Shanon Neff, [facility]     PHQ-9 scores:  PHQ-9 SCORE 2020   PHQ-9 Total Score - - -   PHQ-9 Total Score [facility] - 8 (Mild depression) 4 (Minimal depression)   PHQ-9 Total Score 9 8 4       JELANI-7 scores:  JELANI-7 SCORE 2020   Total Score - - -   Total Score - - 2 (minimal anxiety)   Total Score 10 8 2       Patient Identification:  Adam is a 31 year old year old male  who presents for return visit with me.  Patient attended the session alone.     Interim History:  Adam has not been seen since last August.  He reports that he has been slowly getting better, although he is not to the place he would like to be quite yet.    At his last appointment, we reviewed his medications and considered increasing his bupropion to 450 mg a day.  He has continued to take 300 mg a day.  In addition, it looks as though he has decreased his fluoxetine to 40 mg a day rather than 60 mg a day.  He has been using lorazepam more frequently to help with sleep.    He reports that he has not been using alcohol as much  as he did in the past.  Sometimes he will have a drink a day for a week or so, and then not drink daily for several weeks.  He reports that he is no longer binge drinking, in part because he cannot go out with his friends, but he recognizes that he is feeling better without drinking excessively.    He has started using kratom, initially as a tea, and more lately is taking small capsules.  He is wondering if I would recommend it, which I do not.  I did some reading about it after our visit, and attempted to call him to discuss it.  I did leave a lengthy message and gave him some references online.    Vital Signs:   There were no vitals taken for this visit.    Current Medications:  Current Outpatient Medications   Medication     buPROPion (WELLBUTRIN XL) 300 MG 24 hr tablet     busPIRone HCl (BUSPAR) 30 MG tablet     FLUoxetine (PROZAC) 40 MG capsule     LORazepam (ATIVAN) 0.5 MG tablet     propranolol (INDERAL) 20 MG tablet     buPROPion (WELLBUTRIN XL) 150 MG 24 hr tablet     FLUoxetine (PROZAC) 20 MG capsule     No current facility-administered medications for this visit.         Mental Status Examination:  Adam is a 31-year-old man who appears his stated age and in no acute distress.  Speech is clear and normal in rate and tone.  Eye contact is good over the video connection.  Affect is slightly blunted.  Mood is fair.  Thoughts are logical and spontaneous with no loose associations or flight of ideas.  Thought content shows no psychosis.  No suicidal thoughts.  He is alert and oriented x3.      Assessment and Plan:    ICD-10-CM    1. Dysthymia  F34.1    2. Generalized anxiety disorder  F41.1 busPIRone HCl (BUSPAR) 30 MG tablet     FLUoxetine (PROZAC) 40 MG capsule     LORazepam (ATIVAN) 0.5 MG tablet     propranolol (INDERAL) 20 MG tablet   3. PTSD (post-traumatic stress disorder)  F43.10    4. Alcohol abuse  F10.10        Medical comorbidities include:   Patient Active Problem List   Diagnosis      CARDIOVASCULAR SCREENING; LDL GOAL LESS THAN 160     Depression with anxiety     Elevated BP without diagnosis of hypertension     Chronic right-sided low back pain with right-sided sciatica     Dysthymia     Kyphosis (acquired) (postural)     Poor posture     Thoracic segment dysfunction     Sacral pain     Somatic dysfunction of sacral region     Somatic dysfunction of lumbar region     Paresthesias     Generalized anxiety disorder     Alcohol abuse     Social anxiety disorder     PTSD (post-traumatic stress disorder)     Chronic right shoulder pain     Aftercare following surgery of the musculoskeletal system       Treatment Plan:  Patient Instructions   Continue bupropion  mg daily.     Try using lorazepam 0.5 mg at bedtime to help with sleep.  DO NOT combine with alcohol!     Continue buspirone 30 mg twice daily.     Continue propranolol 20 mg at bedtime.     Continue fluoxetine 40 mg daily.     Continue all other medications per your primary care provider.    Schedule an appointment with me in 3 months or sooner as needed.  You may call [facility] at [phone] to schedule.    [place] Resources:      Go to the Emergency Department as needed or call after hours crisis line at [phone].      To schedule individual or family therapy, call [facility] at [phone].     Follow up with primary care provider as planned or sooner for acute medical concerns.    Call the psychiatric nurse line with medication questions or concerns at [phone].    [employer] may be used to communicate with your provider, but this is not intended to be used for emergencies.    Community Resources:      National Suicide Prevention Lifeline: 625.111.4580 (TTY: 708.858.3407). Call anytime for help.  (www.suicidepreventionlifeline.org)    National [place] on Mental Illness (www.rosalio.org): 409.527.1531 or 885-836-1884.     Mental Health Association (www.mentalhealth.org): 121.192.2415 or  284-732-7191.    [place] Crisis Text [place]: Text [place] to 333215    Suicide LifeLine Chat: [email or web address].[email or web address].org/chat       Administrative Billing:   Video call duration: 23 minutes  Total time spent, including chart review, documentation, and information gathering regarding kratom: 33 minutes    Patient Status:  This is a continuous care patient and medications will be prescribed by the psychiatric provider until further indicated.           As the provider I attest to compliance with applicable laws and regulations related to telemedicine.

## 2021-04-14 NOTE — PATIENT INSTRUCTIONS
Continue bupropion  mg daily.     Try using lorazepam 0.5 mg at bedtime to help with sleep.  DO NOT combine with alcohol!     Continue buspirone 30 mg twice daily.     Continue propranolol 20 mg at bedtime.     Continue fluoxetine 40 mg daily.     Continue all other medications per your primary care provider.    Schedule an appointment with me in 3 months or sooner as needed.  You may call [facility] at [phone] to schedule.    [place] Resources:      Go to the Emergency Department as needed or call after hours crisis line at [phone].      To schedule individual or family therapy, call [facility] at [phone].     Follow up with primary care provider as planned or sooner for acute medical concerns.    Call the psychiatric nurse line with medication questions or concerns at [phone].    [employer] may be used to communicate with your provider, but this is not intended to be used for emergencies.    Community Resources:      National Suicide Prevention Lifeline: 745.781.1004 (TTY: 615.595.7073). Call anytime for help.  (www.suicidepreventionlifeline.org)    National [place] on Mental Illness (www.rosalio.org): 430.328.7503 or 998-649-3194.     Mental Health Association (www.mentalhealth.org): 972.563.9955 or 987-480-4961.    [place] Crisis Text [place]: Text [place] to 022480    Suicide LifeLine Chat: suicidepreKeemotionlifeline.org/chat

## 2021-04-29 ENCOUNTER — IMMUNIZATION (OUTPATIENT)
Dept: NURSING | Facility: CLINIC | Age: 31
End: 2021-04-29
Attending: INTERNAL MEDICINE
Payer: COMMERCIAL

## 2021-04-29 PROCEDURE — 91300 PR COVID VAC PFIZER DIL RECON 30 MCG/0.3 ML IM: CPT

## 2021-04-29 PROCEDURE — 0002A PR COVID VAC PFIZER DIL RECON 30 MCG/0.3 ML IM: CPT

## 2021-07-21 ENCOUNTER — VIRTUAL VISIT (OUTPATIENT)
Dept: PSYCHIATRY | Facility: CLINIC | Age: 31
End: 2021-07-21
Payer: COMMERCIAL

## 2021-07-21 DIAGNOSIS — F43.10 PTSD (POST-TRAUMATIC STRESS DISORDER): ICD-10-CM

## 2021-07-21 DIAGNOSIS — F41.1 GENERALIZED ANXIETY DISORDER: ICD-10-CM

## 2021-07-21 DIAGNOSIS — F34.1 DYSTHYMIA: Primary | ICD-10-CM

## 2021-07-21 DIAGNOSIS — F10.10 ALCOHOL ABUSE: ICD-10-CM

## 2021-07-21 PROCEDURE — 99215 OFFICE O/P EST HI 40 MIN: CPT | Mod: 95 | Performed by: PSYCHIATRY & NEUROLOGY

## 2021-07-21 RX ORDER — PRAMIPEXOLE DIHYDROCHLORIDE 0.5 MG/1
0.5 TABLET ORAL DAILY
Qty: 30 TABLET | Refills: 1 | Status: SHIPPED | OUTPATIENT
Start: 2021-07-21 | End: 2021-08-31

## 2021-07-21 RX ORDER — BUSPIRONE HYDROCHLORIDE 30 MG/1
30 TABLET ORAL 2 TIMES DAILY
Qty: 180 TABLET | Refills: 1 | Status: CANCELLED | OUTPATIENT
Start: 2021-07-21

## 2021-07-21 RX ORDER — PRAMIPEXOLE DIHYDROCHLORIDE 0.12 MG/1
TABLET ORAL
Qty: 18 TABLET | Refills: 0 | Status: SHIPPED | OUTPATIENT
Start: 2021-07-21 | End: 2021-08-31

## 2021-07-21 NOTE — PATIENT INSTRUCTIONS
Start pramipexole 0.125 mg daily for 3 days, the 0.25 mg for 3 days, then 0.375 mg for 3 days, then take 0.5 mg daily.    Continue bupropion  mg daily.     Continue buspirone 30 mg twice daily.     Continue propranolol 20 mg at bedtime.     Continue fluoxetine 40 mg daily.    Expect a call from the Treatment Resistant Depression clinic regarding scheduling and evaluation.    Continue all other medications per your primary care provider.    Schedule an appointment with me in 6 weeks or sooner as needed.  You may call [facility] at [phone] to schedule.    [place] Resources:      Go to the Emergency Department as needed or call after hours crisis line at [phone].      To schedule individual or family therapy, call [facility] at [phone].     Follow up with primary care provider as planned or sooner for acute medical concerns.    Call the psychiatric nurse line with medication questions or concerns at [phone].    [employer] may be used to communicate with your provider, but this is not intended to be used for emergencies.    Community Resources:      National Suicide Prevention Lifeline: 471.973.8050 (TTY: 236.756.4359). Call anytime for help.  (www.suicidepreventionlifeline.org)    National [place] on Mental Illness (www.rosalio.org): 951.683.2263 or 871-564-1411.     Mental Health Association (www.mentalhealth.org): 472.970.3694 or 564-306-9553.    [place] Crisis Text [place]: Text [place] to 307691    Suicide LifeLine Chat: suicidepreNuxeolifeline.org/chat

## 2021-07-21 NOTE — PROGRESS NOTES
Telemedicine Visit: The patient's condition can be safely assessed and treated via synchronous audio and visual telemedicine encounter.      Reason for Telemedicine Visit: Services only offered telehealth    Originating Site (Patient Location): Patient's home    Distant Site (Provider Location): Provider Remote Setting- Home Office    Consent:  The patient/guardian has verbally consented to: the potential risks and benefits of telemedicine (video visit) versus in person care; bill my insurance or make self-payment for services provided; and responsibility for payment of non-covered services.     Mode of Communication:  Video Conference via [employer]    Patient is in the [facility] waiting room.  Patient # [phone]        Outpatient Psychiatric Progress Note    Name: Adam Fish   : 1990                    Primary Care Provider: Lulu Cope MD   Therapist: Shanon Neff, [facility]      PHQ-9 scores:  PHQ-9 SCORE 2020   PHQ-9 Total Score - - -   PHQ-9 Total Score [facility] 8 (Mild depression) 4 (Minimal depression) 5 (Mild depression)   PHQ-9 Total Score 8 4 5       JELANI-7 scores:  JELANI-7 SCORE 2020   Total Score - - -   Total Score - 2 (minimal anxiety) 4 (minimal anxiety)   Total Score 8 2 4       Patient Identification:  Adam is a 31 year old year old male  who presents for return visit with me.  Patient attended the session alone.     Interim History:  Adam reports that life is pretty chaotic right now.  His girlfriend is in the hospital, and was diagnosed with an infected abscess and will be coming home on IV antibiotics.  She had a high fever for over 10 days.  In addition, his basement has had sewage backup in spite of several attempts to clear the  lines.    He reports that he has not been super anxious lately.  He stopped taking lorazepam at bedtime for sleep, and now is taking melatonin, Benadryl, and his buspirone at  bedtime.    He has been using kratom.  He takes it 2 or 3 times per week, either as T, or in a capsule.  He reports that it helps with his anxiety and increases his motivation.  We did discuss risks and benefits at his last appointment.  He has cut back on alcohol use and only uses it occasionally, although he does say that he still drinks to excess at times.    We agreed to a trial of pramipexole after discussing risks and benefits.  I am hoping if it helps with his motivation and mood that he will not feel the need to take the kratom.  He will start with 0.125 mg daily for 3 days, then increase to 0.25 mg for 3 days then 0.375 mg for 3 days, then 0.5 mg/day.    He is interested in more radical treatments.  He asked about ECT, TMS, ketamine, and using psychedelic drugs.  He would like a referral to the treatment resistant depression clinic.    Vital Signs:   There were no vitals taken for this visit.    Current Medications:  Current Outpatient Medications   Medication     buPROPion (WELLBUTRIN XL) 300 MG 24 hr tablet     busPIRone HCl (BUSPAR) 30 MG tablet     FLUoxetine (PROZAC) 40 MG capsule     LORazepam (ATIVAN) 0.5 MG tablet     pramipexole (MIRAPEX) 0.125 MG tablet     pramipexole (MIRAPEX) 0.5 MG tablet     propranolol (INDERAL) 20 MG tablet     No current facility-administered medications for this visit.        Mental Status Examination:  Adam is a 31-year-old man who appears his stated age and in no acute distress.  He is neatly groomed in casual clothing.  Speech is clear and normal in rate and tone.  Eye contact is good over the video connection.  Affect is slightly blunted.  Mood is fair.  Thoughts are logical and spontaneous with no loose associations or flight of ideas.  Thought content shows no psychosis.  No suicidal thoughts.  He is alert and oriented x3.    Assessment and Plan:    ICD-10-CM    1. Dysthymia  F34.1    2. Generalized anxiety disorder  F41.1 pramipexole (MIRAPEX) 0.125 MG tablet      pramipexole (MIRAPEX) 0.5 MG tablet   3. PTSD (post-traumatic stress disorder)  F43.10    4. Alcohol abuse  F10.10        Medical comorbidities include:   Patient Active Problem List   Diagnosis     CARDIOVASCULAR SCREENING; LDL GOAL LESS THAN 160     Depression with anxiety     Elevated BP without diagnosis of hypertension     Chronic right-sided low back pain with right-sided sciatica     Dysthymia     Kyphosis (acquired) (postural)     Poor posture     Thoracic segment dysfunction     Sacral pain     Somatic dysfunction of sacral region     Somatic dysfunction of lumbar region     Paresthesias     Generalized anxiety disorder     Alcohol abuse     Social anxiety disorder     PTSD (post-traumatic stress disorder)     Chronic right shoulder pain     Aftercare following surgery of the musculoskeletal system       Treatment Plan:  Patient Instructions   Start pramipexole 0.125 mg daily for 3 days, the 0.25 mg for 3 days, then 0.375 mg for 3 days, then take 0.5 mg daily.    Continue bupropion  mg daily.     Continue buspirone 30 mg twice daily.     Continue propranolol 20 mg at bedtime.     Continue fluoxetine 40 mg daily.    Expect a call from the Treatment Resistant Depression clinic regarding scheduling and evaluation.    Continue all other medications per your primary care provider.    Schedule an appointment with me in 6 weeks or sooner as needed.  You may call [facility] at [phone] to schedule.    [place] Resources:      Go to the Emergency Department as needed or call after hours crisis line at [phone].      To schedule individual or family therapy, call [facility] at [phone].     Follow up with primary care provider as planned or sooner for acute medical concerns.    Call the psychiatric nurse line with medication questions or concerns at [phone].    [employer] may be used to communicate with your provider, but this is not intended to be used for  emergencies.    Community Resources:      National Suicide Prevention Lifeline: 362.924.6530 (TTY: 400.957.5790). Call anytime for help.  (www.suicidepreventionlifeline.org)    National [place] on Mental Illness (www.rosalio.org): 552.285.8563 or 462-209-7948.     Mental Health Association (www.mentalhealth.org): 981.306.7360 or 720-820-6994.    [place] Crisis Text Line: Text [place] to 044420    Suicide LifeLine Chat: suicideTranscast Media.org/chat         Administrative Billing:   Video call duration: 41 minutes  Total time spent, including chart review and documentation: 53 minutes    Patient Status:  Patient will continue to be seen for ongoing consultation and stabilization.           As the provider I attest to compliance with applicable laws and regulations related to telemedicine.

## 2021-08-19 DIAGNOSIS — F41.1 GENERALIZED ANXIETY DISORDER: ICD-10-CM

## 2021-08-20 RX ORDER — PRAMIPEXOLE DIHYDROCHLORIDE 0.5 MG/1
0.5 TABLET ORAL DAILY
Qty: 30 TABLET | Refills: 1 | OUTPATIENT
Start: 2021-08-20

## 2021-08-31 ENCOUNTER — VIRTUAL VISIT (OUTPATIENT)
Dept: PSYCHIATRY | Facility: CLINIC | Age: 31
End: 2021-08-31
Payer: COMMERCIAL

## 2021-08-31 DIAGNOSIS — F43.10 PTSD (POST-TRAUMATIC STRESS DISORDER): ICD-10-CM

## 2021-08-31 DIAGNOSIS — F41.1 GENERALIZED ANXIETY DISORDER: ICD-10-CM

## 2021-08-31 DIAGNOSIS — F34.1 DYSTHYMIA: Primary | ICD-10-CM

## 2021-08-31 PROCEDURE — 99214 OFFICE O/P EST MOD 30 MIN: CPT | Mod: 95 | Performed by: PSYCHIATRY & NEUROLOGY

## 2021-08-31 RX ORDER — PRAMIPEXOLE DIHYDROCHLORIDE 1 MG/1
1 TABLET ORAL DAILY
Qty: 30 TABLET | Refills: 1 | Status: SHIPPED | OUTPATIENT
Start: 2021-08-31 | End: 2021-10-20

## 2021-08-31 RX ORDER — PRAMIPEXOLE DIHYDROCHLORIDE 0.12 MG/1
TABLET ORAL
Qty: 18 TABLET | Refills: 0 | Status: SHIPPED | OUTPATIENT
Start: 2021-08-31 | End: 2021-11-30 | Stop reason: DRUGHIGH

## 2021-08-31 ASSESSMENT — ANXIETY QUESTIONNAIRES
6. BECOMING EASILY ANNOYED OR IRRITABLE: SEVERAL DAYS
GAD7 TOTAL SCORE: 3
2. NOT BEING ABLE TO STOP OR CONTROL WORRYING: NOT AT ALL
7. FEELING AFRAID AS IF SOMETHING AWFUL MIGHT HAPPEN: NOT AT ALL
IF YOU CHECKED OFF ANY PROBLEMS ON THIS QUESTIONNAIRE, HOW DIFFICULT HAVE THESE PROBLEMS MADE IT FOR YOU TO DO YOUR WORK, TAKE CARE OF THINGS AT HOME, OR GET ALONG WITH OTHER PEOPLE: SOMEWHAT DIFFICULT
1. FEELING NERVOUS, ANXIOUS, OR ON EDGE: SEVERAL DAYS
3. WORRYING TOO MUCH ABOUT DIFFERENT THINGS: NOT AT ALL
5. BEING SO RESTLESS THAT IT IS HARD TO SIT STILL: NOT AT ALL

## 2021-08-31 ASSESSMENT — PATIENT HEALTH QUESTIONNAIRE - PHQ9
SUM OF ALL RESPONSES TO PHQ QUESTIONS 1-9: 4
5. POOR APPETITE OR OVEREATING: SEVERAL DAYS

## 2021-08-31 NOTE — PROGRESS NOTES
Telemedicine Visit: The patient's condition can be safely assessed and treated via synchronous audio and visual telemedicine encounter.      Reason for Telemedicine Visit: Services only offered telehealth    Originating Site (Patient Location): Patient's home    Distant Site (Provider Location): Provider Remote Setting- Home Office    Consent:  The patient/guardian has verbally consented to: the potential risks and benefits of telemedicine (video visit) versus in person care; bill my insurance or make self-payment for services provided; and responsibility for payment of non-covered services.     Mode of Communication:  Video Conference via [employer]    Patient is in the [facility] waiting room.  Patient # [phone]        Outpatient Psychiatric Progress Note    Name: Adam Fish   : 1990                    Primary Care Provider: Lulu Cope MD   Therapist: Shanon Neff, [facility]       PHQ-9 scores:  PHQ-9 SCORE 2021   PHQ-9 Total Score - - -   PHQ-9 Total Score [facility] 4 (Minimal depression) 5 (Mild depression) -   PHQ-9 Total Score 4 5 4       JELANI-7 scores:  JELANI-7 SCORE 2021   Total Score - - -   Total Score 2 (minimal anxiety) 4 (minimal anxiety) -   Total Score 2 4 3       Patient Identification:  Adam is a 31 year old year old male  who presents for return visit with me.  Patient attended the session alone.     Interim History:  Adam reports that things are going better at his house.  His girlfriend is out of the hospital and feeling better, although she continues to have pain and is still on antibiotics.  His  situation is under control, although to completely resolve the problem would cost about $20,000.  He is partner are going to [place] with some friends for the the weekend, which he is looking forward to.    He rates his mood today as 7 out of 10 with 10 being the best.  Anxiety is 0 out of 10 with 10 being the  worst.  He reports he is sleeping well.    At his last appointment, we agreed to a trial of pramipexole.  He has been taking 0.5 mg daily now for 3 or 4 weeks, but has not really noticed any positive or negative effects with it.  We agreed to increase it by 0.125 mg every 3 days to a target dose of 1 mg daily.  If he responds well to the pramipexole, we may be able to decrease or discontinue some of his other medications.    He continues to use kratom when he is anxious, and says that when he drinks kratom tea he can feel a change in his mood and anxiety fairly quickly.  He has not been using his lorazepam often unless he is having a very stressful day at work.    He was referred to the treatment resistant depression clinic after his last appointment, but has not received a call to schedule an appointment.  I will send in a new referral.    Vital Signs:   There were no vitals taken for this visit.      Current Medications:  Current Outpatient Medications   Medication     buPROPion (WELLBUTRIN XL) 300 MG 24 hr tablet     busPIRone HCl (BUSPAR) 30 MG tablet     FLUoxetine (PROZAC) 40 MG capsule     LORazepam (ATIVAN) 0.5 MG tablet     pramipexole (MIRAPEX) 0.125 MG tablet     pramipexole (MIRAPEX) 1 MG tablet     propranolol (INDERAL) 20 MG tablet     No current facility-administered medications for this visit.        Mental Status Examination:  Adam is a 31-year-old man in no acute distress.  He is neatly groomed in casual clothing.  Speech is clear and normal in rate and tone.  Eye contact is good over the video connection.  Motor behavior is appropriate.  Affect is slightly blunted.  Mood is fair.  Thoughts are logical and spontaneous with no loose associations or flight of ideas.  Thought content shows no psychosis.  No suicidal thoughts.  He is alert and oriented x3.      Assessment and Plan:    ICD-10-CM    1. Dysthymia  F34.1 MENTAL HEALTH REFERRAL  - Adult; Psychiatry; Psychiatry; [facility] - Psychiatry Clinic  [phone]; We will contact you to schedule the appointment or please call with any questions   2. Generalized anxiety disorder  F41.1 pramipexole (MIRAPEX) 1 MG tablet     pramipexole (MIRAPEX) 0.125 MG tablet   3. PTSD (post-traumatic stress disorder)  F43.10        Medical comorbidities include:   Patient Active Problem List   Diagnosis     CARDIOVASCULAR SCREENING; LDL GOAL LESS THAN 160     Depression with anxiety     Elevated BP without diagnosis of hypertension     Chronic right-sided low back pain with right-sided sciatica     Dysthymia     Kyphosis (acquired) (postural)     Poor posture     Thoracic segment dysfunction     Sacral pain     Somatic dysfunction of sacral region     Somatic dysfunction of lumbar region     Paresthesias     Generalized anxiety disorder     Alcohol abuse     Social anxiety disorder     PTSD (post-traumatic stress disorder)     Chronic right shoulder pain     Aftercare following surgery of the musculoskeletal system       Treatment Plan:  Patient Instructions   Increase pramipexole 0.625 mg daily for 3 days, the 0.75 mg for 3 days, then 0.875 mg for 3 days, then take 1 mg daily.     Continue bupropion  mg daily.     Continue buspirone 30 mg twice daily.     Continue propranolol 20 mg at bedtime.     Continue fluoxetine 40 mg daily.     Expect a call from the Treatment Resistant Depression clinic regarding scheduling and evaluation.  Continue all other medications per your primary care provider.    Schedule an appointment with me in 6 weeks or sooner as needed.  You may call [facility] at [phone] to schedule.    [place] Resources:      Go to the Emergency Department as needed or call after hours crisis line at [phone].      To schedule individual or family therapy, call [facility] at [phone].     Follow up with primary care provider as planned or sooner for acute medical concerns.    Call the psychiatric nurse line  with medication questions or concerns at [phone].    MyChart may be used to communicate with your provider, but this is not intended to be used for emergencies.    Community Resources:      National Suicide Prevention Lifeline: 482.907.6716 (TTY: 390.573.4358). Call anytime for help.  (www.suicidepreventionlifeline.org)    National [place] on Mental Illness (www.rosalio.org): 142.259.7018 or 143-395-7737.     Mental Health Association (www.mentalhealth.org): 641.930.7372 or 167-508-2240.    [place] Crisis Text Line: Text [place] to 499321    Suicide LifeLine Chat: suicidepreNimble Apps Limitedline.org/chat        Administrative Billing:   Video call duration: 21 minutes  Total time spent, including chart review and documentation: 32 minutes    Patient Status:  This is a continuous care patient and medications will be prescribed by the psychiatric provider until further indicated.           As the provider I attest to compliance with applicable laws and regulations related to telemedicine.

## 2021-08-31 NOTE — PATIENT INSTRUCTIONS
Increase pramipexole 0.625 mg daily for 3 days, the 0.75 mg for 3 days, then 0.875 mg for 3 days, then take 1 mg daily.     Continue bupropion  mg daily.     Continue buspirone 30 mg twice daily.     Continue propranolol 20 mg at bedtime.     Continue fluoxetine 40 mg daily.     Expect a call from the Treatment Resistant Depression clinic regarding scheduling and evaluation.  Continue all other medications per your primary care provider.    Schedule an appointment with me in 6 weeks or sooner as needed.  You may call [facility] at [phone] to schedule.    [place] Resources:      Go to the Emergency Department as needed or call after hours crisis line at [phone].      To schedule individual or family therapy, call [facility] at [phone].     Follow up with primary care provider as planned or sooner for acute medical concerns.    Call the psychiatric nurse line with medication questions or concerns at [phone].    [employer] may be used to communicate with your provider, but this is not intended to be used for emergencies.    Community Resources:      National Suicide Prevention Lifeline: 237.336.9435 (TTY: 780.597.7321). Call anytime for help.  (www.suicidepreventionlifeline.org)    National [place] on Mental Illness (www.rosalio.org): 343.848.8331 or 634-219-5256.     Mental Health Association (www.mentalhealth.org): 655.916.1921 or 805-893-6940.    [place] Crisis Text Line: Text [place] to 510525    Suicide LifeLine Chat: [email or web address] Energylifeline.org/chat

## 2021-09-01 ENCOUNTER — TELEPHONE (OUTPATIENT)
Dept: PSYCHIATRY | Facility: CLINIC | Age: 31
End: 2021-09-01
Payer: COMMERCIAL

## 2021-09-01 ASSESSMENT — ANXIETY QUESTIONNAIRES: GAD7 TOTAL SCORE: 3

## 2021-10-20 ENCOUNTER — OFFICE VISIT (OUTPATIENT)
Dept: FAMILY MEDICINE | Facility: CLINIC | Age: 31
End: 2021-10-20
Payer: COMMERCIAL

## 2021-10-20 VITALS
SYSTOLIC BLOOD PRESSURE: 138 MMHG | WEIGHT: 205 LBS | TEMPERATURE: 97.7 F | OXYGEN SATURATION: 98 % | BODY MASS INDEX: 27.77 KG/M2 | DIASTOLIC BLOOD PRESSURE: 82 MMHG | HEART RATE: 71 BPM | HEIGHT: 72 IN | RESPIRATION RATE: 16 BRPM

## 2021-10-20 DIAGNOSIS — S16.1XXA STRAIN OF NECK MUSCLE, INITIAL ENCOUNTER: Primary | ICD-10-CM

## 2021-10-20 DIAGNOSIS — F41.1 GENERALIZED ANXIETY DISORDER: ICD-10-CM

## 2021-10-20 PROCEDURE — 90715 TDAP VACCINE 7 YRS/> IM: CPT | Performed by: FAMILY MEDICINE

## 2021-10-20 PROCEDURE — 90472 IMMUNIZATION ADMIN EACH ADD: CPT | Performed by: FAMILY MEDICINE

## 2021-10-20 PROCEDURE — 99213 OFFICE O/P EST LOW 20 MIN: CPT | Mod: 25 | Performed by: FAMILY MEDICINE

## 2021-10-20 PROCEDURE — 90686 IIV4 VACC NO PRSV 0.5 ML IM: CPT | Performed by: FAMILY MEDICINE

## 2021-10-20 PROCEDURE — 90471 IMMUNIZATION ADMIN: CPT | Performed by: FAMILY MEDICINE

## 2021-10-20 RX ORDER — CYCLOBENZAPRINE HCL 5 MG
5 TABLET ORAL 3 TIMES DAILY PRN
Status: CANCELLED | OUTPATIENT
Start: 2021-10-20

## 2021-10-20 RX ORDER — PRAMIPEXOLE DIHYDROCHLORIDE 1 MG/1
1 TABLET ORAL DAILY
Qty: 30 TABLET | Refills: 1 | Status: SHIPPED | OUTPATIENT
Start: 2021-10-20 | End: 2021-11-18

## 2021-10-20 ASSESSMENT — MIFFLIN-ST. JEOR: SCORE: 1917.87

## 2021-10-20 NOTE — PROGRESS NOTES
Assessment & Plan     Strain of neck muscle, initial encounter  History and clinical presentation are consistent with cervical strain. The patient was advised to proceed with physical therapy. Consider X-ray if symptoms failed to improve.  plan  - [facility]erral; Future  - XR Cervical Spine 2/3 Views; Future    Generalized anxiety disorder  Advised him to continue with previously prescribed Mirapex   - pramipexole (MIRAPEX) 1 MG tablet; Take 1 tablet (1 mg) by mouth daily    Return in about 2 weeks (around 11/3/2021) for Follow-up visit.    Lulu Cope MD  [facility]    Sunni Medina is a 31 year old who presents for the following health issues:    HPI   30 yo who presents to the clinic for intermittent neck stiffness. He notices the stiffness early in the morning and it decreases in intensity later during the day. He denies any numbness or tingling. Denies any changes to his pillow or mattress. Pain is mild 2-3/10. Denies any numbness or tingling. Denies any similar episodes in the past.   Patient works from home, he spends 8-10 hours on the computer.   Did not take any medications for the pain.     Review of Systems   Constitutional, HEENT, cardiovascular, pulmonary, gi and gu systems are negative, except as otherwise noted.      Objective    /82 (BP Location: Left arm, Patient Position: Sitting, Cuff Size: Adult Regular)   Pulse 71   Temp 97.7  F (36.5  C) (Temporal)   Resp 16   Ht 1.829 m (6')   Wt 93 kg (205 lb)   SpO2 98%   BMI 27.80 kg/m    Body mass index is 27.8 kg/m .  Physical Exam   GENERAL: healthy, alert and no distress  NECK: no adenopathy, no asymmetry, masses, or scars and thyroid normal to palpation  RESP: lungs clear to auscultation - no rales, rhonchi or wheezes  CV: regular rate and rhythm, normal S1 S2, no S3 or S4, no murmur, click or rub, no peripheral edema and peripheral pulses strong  MS: cervical spine ROM is intact. No point  tenderness to palpation. No radiculopathy. Mild left sided curvature to his thoracic spine. Left scapula is higher than right.

## 2021-10-20 NOTE — PROGRESS NOTES
"  {PROVIDER CHARTING PREFERENCE:627412}    Sunni Medina is a 31 year old who presents for the following health issues {ACCOMPANIED BY STATEMENT (Optional):622200}    HPI     Neck Pain  Onset/Duration: ***  Description:   Location: ***  Radiation: {.:345294::\"none\"}  Intensity: {.:981451}  Progression of Symptoms:  {.:460381}  Accompanying Signs & Symptoms:  Burning, tingling, prickly sensation in arm(s): {.:730318::\"no\"}  Numbness in arm(s): {.:578973::\"no\"}  Weakness in arm(s):  {.:357404::\"no\"}  Fever: {.:525440::\"no\"}  Headache: {.:970350::\"no\"}  Nausea and/or vomiting: {.:488456::\"no\"}  History:   Trauma: {.:078027::\"no\"}  Previous neck pain: {.:947665::\"no\"}  Previous surgery or injections: {.:485688::\"no\"}  Previous Imaging (MRI,X ray): {.:604376::\"no\"}  Precipitating or alleviating factors: {NONE DEFAULTED:987388::\"None\"}  Does movement impact the pain:  {.:704639::\"no\"}  Therapies tried and outcome: {MS RELIEF ITEMS:348099::\"nothing\"}    {additonal problems for provider to add (Optional):823403}    Review of Systems   {ROS COMP (Optional):991302}      Objective    There were no vitals taken for this visit.  There is no height or weight on file to calculate BMI.  Physical Exam   {Exam List (Optional):673691}    {Diagnostic Test Results (Optional):634152}    {AMBULATORY ATTESTATION (Optional):375573}        "

## 2021-11-01 ENCOUNTER — ANCILLARY PROCEDURE (OUTPATIENT)
Dept: GENERAL RADIOLOGY | Facility: CLINIC | Age: 31
End: 2021-11-01
Attending: FAMILY MEDICINE
Payer: COMMERCIAL

## 2021-11-01 DIAGNOSIS — S16.1XXA STRAIN OF NECK MUSCLE, INITIAL ENCOUNTER: ICD-10-CM

## 2021-11-01 PROCEDURE — 72040 X-RAY EXAM NECK SPINE 2-3 VW: CPT | Performed by: RADIOLOGY

## 2021-11-17 DIAGNOSIS — F41.8 DEPRESSION WITH ANXIETY: Primary | ICD-10-CM

## 2021-11-17 DIAGNOSIS — F41.1 GENERALIZED ANXIETY DISORDER: ICD-10-CM

## 2021-11-17 NOTE — TELEPHONE ENCOUNTER
Pramipexole 0.125      Last Written Prescription Date:    Last Fill Quantity: ,   # refills:   Last Office Visit:   Future Office visit:       Routing refill request to provider for review/approval because:  Unable to order

## 2021-11-18 RX ORDER — PRAMIPEXOLE DIHYDROCHLORIDE 1 MG/1
1 TABLET ORAL DAILY
Qty: 30 TABLET | Refills: 1 | Status: SHIPPED | OUTPATIENT
Start: 2021-11-18 | End: 2021-11-30

## 2021-11-18 RX ORDER — BUSPIRONE HYDROCHLORIDE 30 MG/1
30 TABLET ORAL 2 TIMES DAILY
Qty: 180 TABLET | Refills: 1 | Status: SHIPPED | OUTPATIENT
Start: 2021-11-18 | End: 2022-08-02

## 2021-11-18 RX ORDER — BUPROPION HYDROCHLORIDE 300 MG/1
300 TABLET ORAL EVERY MORNING
Qty: 90 TABLET | Refills: 1 | Status: SHIPPED | OUTPATIENT
Start: 2021-11-18 | End: 2022-08-02

## 2021-11-19 DIAGNOSIS — F41.1 GENERALIZED ANXIETY DISORDER: ICD-10-CM

## 2021-11-19 RX ORDER — PRAMIPEXOLE DIHYDROCHLORIDE 1 MG/1
1 TABLET ORAL DAILY
Qty: 30 TABLET | Refills: 1 | OUTPATIENT
Start: 2021-11-19

## 2021-11-30 ENCOUNTER — VIRTUAL VISIT (OUTPATIENT)
Dept: PSYCHIATRY | Facility: CLINIC | Age: 31
End: 2021-11-30
Payer: COMMERCIAL

## 2021-11-30 VITALS — BODY MASS INDEX: 27.8 KG/M2 | WEIGHT: 205 LBS

## 2021-11-30 DIAGNOSIS — F41.8 DEPRESSION WITH ANXIETY: Primary | ICD-10-CM

## 2021-11-30 DIAGNOSIS — F41.1 GENERALIZED ANXIETY DISORDER: ICD-10-CM

## 2021-11-30 PROCEDURE — 99215 OFFICE O/P EST HI 40 MIN: CPT | Mod: 95 | Performed by: PSYCHIATRY & NEUROLOGY

## 2021-11-30 RX ORDER — FLUOXETINE 40 MG/1
40 CAPSULE ORAL DAILY
Qty: 90 CAPSULE | Refills: 1 | Status: SHIPPED | OUTPATIENT
Start: 2021-11-30 | End: 2022-08-02

## 2021-11-30 RX ORDER — PRAMIPEXOLE DIHYDROCHLORIDE 1 MG/1
TABLET ORAL
Qty: 45 TABLET | Refills: 5 | Status: SHIPPED | OUTPATIENT
Start: 2021-11-30 | End: 2022-08-02

## 2021-11-30 RX ORDER — PROPRANOLOL HYDROCHLORIDE 20 MG/1
20 TABLET ORAL DAILY
Qty: 90 TABLET | Refills: 1 | Status: SHIPPED | OUTPATIENT
Start: 2021-11-30 | End: 2022-08-02

## 2021-11-30 RX ORDER — PRAMIPEXOLE DIHYDROCHLORIDE 0.12 MG/1
TABLET ORAL
Qty: 18 TABLET | Refills: 0 | Status: CANCELLED | OUTPATIENT
Start: 2021-11-30

## 2021-11-30 RX ORDER — PRAMIPEXOLE DIHYDROCHLORIDE 0.12 MG/1
TABLET ORAL
Qty: 18 TABLET | Refills: 0 | Status: SHIPPED | OUTPATIENT
Start: 2021-11-30 | End: 2022-08-02

## 2021-11-30 RX ORDER — BUSPIRONE HYDROCHLORIDE 30 MG/1
30 TABLET ORAL 2 TIMES DAILY
Qty: 180 TABLET | Refills: 1 | Status: CANCELLED | OUTPATIENT
Start: 2021-11-30

## 2021-11-30 RX ORDER — BUPROPION HYDROCHLORIDE 300 MG/1
300 TABLET ORAL EVERY MORNING
Qty: 90 TABLET | Refills: 1 | Status: CANCELLED | OUTPATIENT
Start: 2021-11-30

## 2021-11-30 RX ORDER — LORAZEPAM 0.5 MG/1
0.5 TABLET ORAL DAILY PRN
Qty: 90 TABLET | Refills: 1 | Status: SHIPPED | OUTPATIENT
Start: 2021-11-30 | End: 2022-07-07

## 2021-11-30 ASSESSMENT — ANXIETY QUESTIONNAIRES
5. BEING SO RESTLESS THAT IT IS HARD TO SIT STILL: NOT AT ALL
IF YOU CHECKED OFF ANY PROBLEMS ON THIS QUESTIONNAIRE, HOW DIFFICULT HAVE THESE PROBLEMS MADE IT FOR YOU TO DO YOUR WORK, TAKE CARE OF THINGS AT HOME, OR GET ALONG WITH OTHER PEOPLE: SOMEWHAT DIFFICULT
7. FEELING AFRAID AS IF SOMETHING AWFUL MIGHT HAPPEN: NOT AT ALL
GAD7 TOTAL SCORE: 6
6. BECOMING EASILY ANNOYED OR IRRITABLE: SEVERAL DAYS
2. NOT BEING ABLE TO STOP OR CONTROL WORRYING: SEVERAL DAYS
1. FEELING NERVOUS, ANXIOUS, OR ON EDGE: SEVERAL DAYS
3. WORRYING TOO MUCH ABOUT DIFFERENT THINGS: SEVERAL DAYS

## 2021-11-30 ASSESSMENT — PATIENT HEALTH QUESTIONNAIRE - PHQ9: 5. POOR APPETITE OR OVEREATING: MORE THAN HALF THE DAYS

## 2021-11-30 NOTE — TELEPHONE ENCOUNTER
Waleska Azevedo MD Schoos, Emily N, RN; Kenyetta Padron RN Hello,     I have referred Adam to the TRD clinic twice in the last 6 months, but he has not received a call to schedule an evaluation.  Could one of you please contact him?     Thanks so much!     Odette Azevedo MD   [facility]iatry   [facility]

## 2021-11-30 NOTE — PATIENT INSTRUCTIONS
Increase pramipexole to 1.125 mg daily for 3 days, the 1.250 mg for 3 days, then 1.375 mg for 3 days, then take 1.5 mg daily.     Continue bupropion  mg daily.     Continue buspirone 30 mg twice daily.     Continue propranolol 20 mg at bedtime.     Continue fluoxetine 40 mg daily.     Expect a call from the Treatment Resistant Depression clinic regarding scheduling and evaluation.    Continue all other medications per your primary care provider.    Schedule an appointment with me in 3 months or sooner as needed.  You may call [facility] at [phone] to schedule.    [place] Resources:      Go to the Emergency Department as needed or call after hours crisis line at [phone].      To schedule individual or family therapy, call [facility] at [phone].     Follow up with primary care provider as planned or sooner for acute medical concerns.    Call the psychiatric nurse line with medication questions or concerns at [phone].    [employer] may be used to communicate with your provider, but this is not intended to be used for emergencies.    Community Resources:      National Suicide Prevention Lifeline: 456.269.9936 (TTY: 587.240.2422). Call anytime for help.  (www.suicidepreventionlifeline.org)    National [place] on Mental Illness (www.rosalio.org): 340.923.8119 or 426-647-6023.     Mental Health Association (www.mentalhealth.org): 696.966.1567 or 306-760-7804.    [place] Crisis Text Line: Text [place] to 792179    Suicide LifeLine Chat: suicidepreventionlifeline.org/chat

## 2021-11-30 NOTE — PROGRESS NOTES
Telemedicine Visit: The patient's condition can be safely assessed and treated via synchronous audio and visual telemedicine encounter.      Reason for Telemedicine Visit: Services only offered telehealth    Originating Site (Patient Location): Patient's home    Distant Site (Provider Location): Provider Remote Setting- Home Office    Consent:  The patient/guardian has verbally consented to: the potential risks and benefits of telemedicine (video visit) versus in person care; bill my insurance or make self-payment for services provided; and responsibility for payment of non-covered services.     Mode of Communication:  Video Conference via [employer]    As the provider I attest to compliance with applicable laws and regulations related to telemedicine.            Outpatient Psychiatric Progress Note    Name: Adam Fish   : 1990                    Primary Care Provider: Lulu Cope MD   Therapist: Shanon Neff, [facility]        PHQ-9 scores:  PHQ-9 SCORE 2021   PHQ-9 Total Score - - -   PHQ-9 Total Score MyChart 5 (Mild depression) - -   PHQ-9 Total Score 5 4 4       JELANI-7 scores:  JELNAI-7 SCORE 2021   Total Score - - -   Total Score 4 (minimal anxiety) - -   Total Score 4 3 6       Patient Identification:  Adam is a 31 year old year old male  who presents for return visit with me.  Patient attended the session alone.     Interim History:  Adam reports that he is doing fairly well.  He rates his mood today as 6 out of 10 with 10 being the best.  He definitely notices that he has had improvement over the last couple of years with therapy and medications, but does not feel that he is where he would like to be quite yet.  He continues to take his medications on a regular basis, and continues to use kratom a few times a week to help himself feel better.    He is not sure that he is had any significant improvement with the addition of  pramipexole.  We discussed the possibility of discontinuing it versus increasing the dose.  Preferred to increase the dose to see if it provided any benefit before discontinuing it.  He has noticed a few involuntary twitches since being on the pramipexole.  If he has any adverse side effects with increasing the dose, he can contact me and we will taper off of it.    He has requested a referral to the treatment resistant depression clinic a couple of times, but has never been contacted.  I will try again by contacting the nursing staff there.    Adam is some PTO to use up, so is planning a trip to [place].  He reports things are going well at work.  His roommate has moved out, so he and his partner have his home to them which is a nice change.    Vital Signs:   Wt 93 kg (205 lb)   BMI 27.80 kg/m        Current Medications:  Current Outpatient Medications   Medication     buPROPion (WELLBUTRIN XL) 300 MG 24 hr tablet     busPIRone HCl (BUSPAR) 30 MG tablet     FLUoxetine (PROZAC) 40 MG capsule     LORazepam (ATIVAN) 0.5 MG tablet     pramipexole (MIRAPEX) 0.125 MG tablet     pramipexole (MIRAPEX) 1 MG tablet     propranolol (INDERAL) 20 MG tablet     No current facility-administered medications for this visit.        The [place] Prescription Monitoring Program has been reviewed and there are no concerns about diversionary activity for controlled substances at this time.      Mental Status Examination:  Adam is a 31-year-old man who appears his stated age and in no acute distress.  He is neatly groomed in casual clothing.  Speech is clear and normal in rate and tone.  Eye contact is good over the video connection.  Affect is full.  Mood is fair.  Thoughts are logical and spontaneous with no loose association or flight of ideas.  Thought content shows no psychosis.  No suicidal thoughts.  He is alert and oriented x3.    Assessment and Plan:    ICD-10-CM    1. Depression with anxiety  F41.8 pramipexole (MIRAPEX)  0.125 MG tablet   2. Generalized anxiety disorder  F41.1 LORazepam (ATIVAN) 0.5 MG tablet     propranolol (INDERAL) 20 MG tablet     pramipexole (MIRAPEX) 1 MG tablet     FLUoxetine (PROZAC) 40 MG capsule       Medical comorbidities include:   Patient Active Problem List   Diagnosis     CARDIOVASCULAR SCREENING; LDL GOAL LESS THAN 160     Depression with anxiety     Elevated BP without diagnosis of hypertension     Chronic right-sided low back pain with right-sided sciatica     Dysthymia     Kyphosis (acquired) (postural)     Poor posture     Thoracic segment dysfunction     Sacral pain     Somatic dysfunction of sacral region     Somatic dysfunction of lumbar region     Paresthesias     Generalized anxiety disorder     Alcohol abuse     Social anxiety disorder     PTSD (post-traumatic stress disorder)     Chronic right shoulder pain     Aftercare following surgery of the musculoskeletal system       Treatment Plan:  Patient Instructions   Increase pramipexole to 1.125 mg daily for 3 days, the 1.250 mg for 3 days, then 1.375 mg for 3 days, then take 1.5 mg daily.     Continue bupropion  mg daily.     Continue buspirone 30 mg twice daily.     Continue propranolol 20 mg at bedtime.     Continue fluoxetine 40 mg daily.     Expect a call from the Treatment Resistant Depression clinic regarding scheduling and evaluation.    Continue all other medications per your primary care provider.    Schedule an appointment with me in 3 months or sooner as needed.  You may call [facility] at [phone] to schedule.    [place] Resources:      Go to the Emergency Department as needed or call after hours crisis line at [phone].      To schedule individual or family therapy, call [facility] at [phone].     Follow up with primary care provider as planned or sooner for acute medical concerns.    Call the psychiatric nurse line with medication questions or concerns at  [phone].    [email or web address] may be used to communicate with your provider, but this is not intended to be used for emergencies.    Community Resources:      National Suicide Prevention Lifeline: 800.956.9635 (TTY: 374.543.3730). Call anytime for help.  (www.suicidepreventionlifeline.org)    National [place] on Mental Illness (www.rosalio.org): 698.877.3482 or 193-218-6981.     Mental Health Association (www.mentalhealth.org): 623.359.2321 or 650-453-5185.    [place] Crisis Text Line: Text [place] to 355324    Suicide LifeLine Chat: suicide[place].org/chat         Administrative Billing:   Video call duration: 33 minutes   Start: 9:59 AM   Stop: 10:32 AM  Total time spent, including chart review and documentation: 43 minutes    Patient Status:  This is a continuous care patient and medications will be prescribed by the psychiatric provider until further indicated.

## 2021-12-01 ASSESSMENT — PATIENT HEALTH QUESTIONNAIRE - PHQ9: SUM OF ALL RESPONSES TO PHQ QUESTIONS 1-9: 4

## 2021-12-01 ASSESSMENT — ANXIETY QUESTIONNAIRES: GAD7 TOTAL SCORE: 6

## 2021-12-01 NOTE — NURSING NOTE
________________________________  Medications Phoned  to Pharmacy [] yes [x]no  Name of Pharmacist:  List Medications, including dose, quantity and instructions     Medications ordered this visit were e-scribed.  Verified by order class [x] yes  [] no  Fluoxetine 40mg, lorazepam 0.5mg, mirapex 0.125mg, propranolol 20mg    Medication changes or discontinuations were communicated to patient's pharmacy: [] yes  [x] no     Dictation completed at time of chart check: [x] yes  [] no     I have checked the documentation for today s encounters and the above information has been reviewed and completed.      Litzy Alcaraz MA on December 1, 2021 at 12:54 PM

## 2021-12-21 ENCOUNTER — VIRTUAL VISIT (OUTPATIENT)
Dept: PSYCHIATRY | Facility: CLINIC | Age: 31
End: 2021-12-21
Payer: COMMERCIAL

## 2021-12-21 DIAGNOSIS — F33.1 MODERATE EPISODE OF RECURRENT MAJOR DEPRESSIVE DISORDER (H): Primary | ICD-10-CM

## 2021-12-31 ENCOUNTER — VIRTUAL VISIT (OUTPATIENT)
Dept: PSYCHIATRY | Facility: CLINIC | Age: 31
End: 2021-12-31
Payer: COMMERCIAL

## 2021-12-31 DIAGNOSIS — F33.1 MODERATE EPISODE OF RECURRENT MAJOR DEPRESSIVE DISORDER (H): Primary | ICD-10-CM

## 2021-12-31 ASSESSMENT — PATIENT HEALTH QUESTIONNAIRE - PHQ9: SUM OF ALL RESPONSES TO PHQ QUESTIONS 1-9: 7

## 2021-12-31 NOTE — PROGRESS NOTES
Adam is a 31 year old who is being evaluated via a billable video visit.      How would you like to obtain your AVS? [employer]  If the video visit is dropped, the invitation should be resent by: Text to cell phone: [phone]  Will anyone else be joining your video visit? No      Video Start Time: 1:00 pm  Video-Visit Details    Type of service:  Video Visit    Video End Time:1:41 pm    Originating Location (pt. Location): Home    Distant Location (provider location):  [facility]     Platform used for Video Visit: Lucio
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
## 2022-01-03 NOTE — PROGRESS NOTES
Service Date: 12/31/2021    VIDEO VISIT     M PHYSICIAN TRD PROGRAM MEDICATION THERAPY MANAGEMENT     This is a video visit from my home to the patient's home via [facility] due to COVID.  We used [employer].  The patient's e-mail was [email or web address].  [facility] [phone].        DICTATION IDENTIFIER:    NAME:  Adam Fish  YOB: 1990  VIDEO VISIT DATE:  12/31/2021    IDENTIFYING INFORMATION AND INTRODUCTION:  Adam is a 31-year-old male seen on a video visit today for an M Physician TRD MT consultation.  He lives in [place], [place], and he is a new adult to the M Physician TRD program.  He is interested in ketamine or TMS.      The patient works from home.  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    Psychiatrist is Dr. Burt Calhoun, and he will see him on 01/05/2022 at 2:00 p.m. in person, and this was communicated to the patient.    CHIEF COMPLAINTS:  Depression, anxiety, some insomnia.    Informants include the patient and review of past medical records.  Please be aware that I did not have all the past medical records in my possession when I saw the patient, and it appears that in 2016, the patient was off all prescription medication.    Time spent was 2 hours without the patient and 41 minutes with the patient.    MEDICATION INFORMATION:    1.  Current Psychotropic Medications:    a.  Fluoxetine/Prozac 40 mg q. a.m.  The patient is on it for the last 3 years.  b.  Lorazepam 0.5 mg once to twice per week currently for anxiety.  c.  Mirapex 1.5 mg daily.  d.  Propranolol 20 mg at bedtime for heart palpitations and blood pressure.  e.  Wellbutrin XL/bupropion 300 mg q. a.m.  f.  Buspirone 30 mg.  The patient takes 30 mg twice a day for a total of 60 mg for anxiety.  g.  Melatonin 3 mg at bedtime for sleep.    h.  Diphenhydramine/Benadryl 25 mg at bedtime for sleep.      2.  Concomitant Medications:   a.  Ibuprofen p.r.n.    3.  Herbal Remedies:    a.  Kratom powder 1 tablespoon 4-5 times  per week.    b.  Cannabis gummies 5 mg 2-3 times per week.  Also smokes once per week.  He uses CBD and D8 (THC) for pain.  c.  Mushrooms once to twice per year.    d.  MDMA once to 3 times per year when available.    4.  Drug/Drug Interactions:  a.  Diphenhydramine and bupropion may result in increased risk of seizures.    b.  Fluoxetine and propranolol may result in increased risk of propranolol toxicity.  The patient is on low-dose propranolol, so it should not be an issue.    c.  Fluoxetine and bupropion may result in increased risk of seizures.    d.  Bupropion and propranolol may result in increased exposure of CYP2D6 substrate propranolol.  e.  Fluoxetine and buspirone may result in worsening of psychiatric symptoms.  f.  Kratom can cause increase in blood pressure, abnormal heart rhythm and many more serious side effects like encephalopathy, hallucination, insomnia, liver damage, rhabdomyolysis, seizures, etc.    5.  Current Reported Side Effects:  Yes.  Slight sexual side effects, nothing serious.    6.  Gene Testing:  Yes, was done.  He will send it in i-nexus.    7.  ALLERGIES:  No known drug allergies.    PAST MEDICAL HISTORY:    1.  MDD.  2.  Anxiety.  3.  PTSD as a result of a trip to the [facility] where patient had a bad mushroom trip. After that, he had PTSD.  On that day, he took a lot of mushrooms.  4.  Hypertension.  5.  Chronic back pain.  6.  Paresthesias.  7.  Sacral pain.  8.  History of alcohol abuse.    FAMILY MENTAL HEALTH: Depression and anxiety on maternal side.    DEPRESSION HISTORY:    1.  First time experienced depression was around 2009, around age 20-22.  Diagnosed with depression was in 2015.    2.  First time antidepressant drug started was 05/2015, and it was Lexapro for several months, and he was off and on for 1 year.    3.  Hospitalization for severe suicidal ideation or suicide attempt:  No.    4.  Treatment:  Partial hospitalization denied.      5.  Suicidal ideation  currently:  Denied.    6.  Individual psychotherapy:  Since 2018.  Started therapy, and it helps, and he has it every other week.    7.  CBT:  Yes, helped somewhat.    8.  DBT:  Yes, did not help.    SOCIAL AND ENVIRONMENTAL ASPECTS:  He lives with his girlfriend and 2 cats.    PHARMACOTHERAPY INDICATORS:    A.  Pharmaceutical Aspects  1.  Economic Assessment:  This patient has prescription coverage with his insurance plan.  Prescription drug co-payment is manageable.  The cost of obtaining prescribed medications does not interfere with compliance.    2.  Pharmacy:  [facility].    3.  Compliance Assessment:  The patient is independent.    The patient is a relatively good historian.  He does not use a pillbox.  He misses medication rarely.  When I asked him when the depression gets too severe what is he doing, he says he turns to his girlfriend or drinks kratom tea.  His girlfriend is bipolar and has ADHD.    B.  Pharmacokinetic Aspects  4.  Habits and Chemical Use:    a.  Alcohol:  He uses currently 10 drinks per week.  He says it is beer mostly, and he sips some whiskey. In the past, he would have a lot of shots, 30-40 per week.  I told the patient if he is a TMS candidate, no alcohol at all; otherwise, it can lower the seizure threshold.    b.  Tobacco:  Denied.  c.  Caffeine: Occasionally drinks tea.  d.  Recreational drugs:  Yes.  Kratom, cannabis, mushrooms, MDMA, LSD in the past and tried cocaine in the past.    e.  Exercise: He loves to canoe.  He likes to play basketball 2-3 times per week.  He does fat tire biking, and he feels a lot better when he does it.  f.  Hobbies:  Records:  He collects them, and he plays them.  Snowboarding, biking.    RATING OF ANTIDEPRESSANT DRUGS:    Antidepressant treatment history using antidepressant resistant rating where available.    1.  Selective serotonin reuptake inhibitors (SSRIs):    a.  Escitalopram/lexapro:  From 02/2015 and then 03/2017.  He was on and off it with a max  dose of 20 mg per day.  He had sexual side effects, and he said it was ineffective.  It would give him a rating of 4.  b.  Fluoxetine/Prozac:  From 05/2019 to 2021.  Max dose 60 mg per day.  It did not change the increase to 60 mg per day, so he is back to 40. Response is questionable. It would give it a rating of 4.    The SSRIs are giving him some sexual side effects.    2.  Serotonin noradrenaline reuptake inhibitors (SNRIs): Negative.    3.  Serotonin modulators and stimulators:  Negative.    4. Noradrenaline and dopamine reuptake inhibitors (NDRIs):    a.  Bupropion:  From 01/2017 until 2021.  Presently on.  Max dose 450 mg per day.  Would give it a rating of 4.    5.  Tricyclic antidepressants (TCAs):  Negative.    6.  Tetracyclic antidepressants:  a.  Trazodone/Desyrel:  In 03/2018 until 2020.  Increased to 150 mg per day.  It made him too groggy when he used it for sleep.    7.  Monoamine oxidase inhibitors (MAOIs):  Negative.    8.  Augmentation therapy:    - Benzodiazepines:  a.  Lorazepam: From 09/2019 to present.  Max dose 0.5 mg per day.      9.  Miscellaneous:  a. Buspirone/BuSpar:  From 11/2019 to present.  Max dose 60 mg per day.  Is used for anxiety.  b. Gabapentin/Neurontin:  From 07/2019.  Max dose 900 mg per day. Was used for anxiety and nerve pain.  c. Hydroxyzine/Atarax: In 2019.  It made him tired.  d.  Propranolol:  From 04/2020 to present, 20 mg per day for anxiety and hypertension.  e.  Pramipexole/Mirapex: From 07/2021 to present. Up to 1.5 mg per day.  So far, no change.    10.  Miscellaneous sleep aids:    a.  Diphenhydramine/Benadryl:  Yes, and it helps.  b.  Melatonin was tried.  c.  Gabapentin was tried.  d.  Trazodone was tried.    11.  Ketamine history:  Negative.    12.  Other reported treatments for depression and related mood disorders:    a.  TMS:  Negative.  b.  ECT:  Negative.  c.  VNS:  Negative.  d.  Bright lights:  Negative.  e. CPAP:  Negative.    COMMENTS:    1.  The  patient will follow up with MTM as needed.  2.  All MTM findings will be shared with clinic psychiatrist.  3.  The patient was instructed to return for upcoming appointment with Dr. Calhoun for recommendation and future treatment options.    Thank you for the opportunity to participate in the care of this patient.    Cassie Padilla, [place]  Pharmaceutical Care Coordinator    Cassie Padilla PharmD        D: 2022   T: 2022   MT: gail    Name:     TARSHA SU  MRN:      [id number]        Account:      [id number]   :      1990           Service Date: 2021       Document: [id number]

## 2022-01-05 ENCOUNTER — VIRTUAL VISIT (OUTPATIENT)
Dept: PSYCHIATRY | Facility: CLINIC | Age: 32
End: 2022-01-05
Payer: COMMERCIAL

## 2022-01-05 DIAGNOSIS — F33.1 MODERATE EPISODE OF RECURRENT MAJOR DEPRESSIVE DISORDER (H): Primary | ICD-10-CM

## 2022-01-05 ASSESSMENT — PATIENT HEALTH QUESTIONNAIRE - PHQ9: SUM OF ALL RESPONSES TO PHQ QUESTIONS 1-9: 7

## 2022-01-05 NOTE — PROGRESS NOTES
" [facility] TMS Program  [address], [address]  [place] [address]  New Patient Evaluation       PCP- Lulu Cope  Specialty Providers- no  Therapist- Marianne Neff at [facility] and yes  Psychiatric Med Management Provider- Gabrielle Azevedo at [facility]  Other Mental Health Providers- no    Referred by:  Psychiatrist  Referred for evaluation of:  depression.      Chief Complaint                                                                                                       Low moods     History of Present Illness                                                                                      Pertinent Background and Present Symptoms:    Depression started after taking [place]il[place] in [place]. Never had depression before bad trip at age 19. Felt depressed on drive home, sinking  Feeling in car ride on way home, extreme emptiness lasted for a week. When would smoke MJ those feelings would come back, stopped smoking for a long time. Low mood persisted, college girlfriend asked if he was sad, patient wasn't aware of it. 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      Rarely felt depressed for the week came back from [place], 90% better. First month of dating girlfriend was a \"high\".     Last fall felt more at peace, felt work was meaningful. Has somewhat fallen off that feeling, associates it with having less demanding work, leading him to feel less engaged.     FH: Mom has had depression since college, older brother experienced it for a year (one episode).     Treatment history:  Therapy is most helpful.  Buspar helped with anxiety, thinks that working from home has helped a lot with anxiety. Fluoxetine and bupropion limit the floor but don't help that much.     Exercise: feels better when exercises better but motivation gets low, even though he knows he'll feel better can't do it. Bike is main form, also enjoys " "basketball, fat tire bike.     Getting good sleep helps (melatonin, propanolol helps).     Was in the process of tapering up pramipexole, doesn't feel like it's helped.     Therapy: went to one session in 2015 and \"hated it, I wasn't ready\". Found another therapist that did CBT, didn't think it was Nohelpful. Eventually did connect with therapist he likes \"just talking through it\", tried EMDR but didn't feel like he could do it. T     No psychosis    No OCD        Psychiatric Review of Symptoms:     Deirdre: Negative  Psychosis: Negative   Eating disorder: Negative  Homicidal Ideation: Negative         SUBSTANCE USE HISTORY                                                                 RECENT SUBSTANCE USE:     TOBACCO- none     CAFFEINE- some caffeine, notes that he drinks kratum tea and finds this very helpful with mood, motivation, and activation  ALCOHOL- Adam reports that he has reduced his alcohol intake over the last 1-2 months at his girlfriend's request. He reports that before that, he did drink more than he intended some times, did get hung over. Reducing frequency and quantity has not been a problem  CANNABIS- gummies, fairly regular use         OTHER ILLICIT DRUGS- occasional use of mushrooms, MDMA    Past Use-   TOBACCO- none       CAFFEINE- unknown   ALCOHOL- regular use, some binge drinking  CANNABIS- smoking            OTHER ILLICIT DRUGS- occasional  use of mushrooms, MDMA    CD Treatment Hx: No  Medical Consequences (eg HIV/Hepatitis)- No  Legal Consequences- No     PSYCHIATRIC HISTORY     Past diagnoses: Depression with anxiety, JELANI    Past medication trials: multiple    Hospitalizations: none    Commitment: No, Current Larson order: No    ECT trials: No    TMS trials:  No      Ketamine:  No    Suicide attempts: No    Self-injurious behavior: No    Violent behavior: No    Outpatient Programs & Services [Psychotherapy, DBT, Day Treatment, Eating Disorder Tx etc]:   Current:  Medication management " and Outpatient individual psychotherapy    Past:  Medication management and Outpatient individual psychotherapy    SOCIAL and FAMILY HISTORY                        patient reported                                     Living situation: Adam lives with his girlfriend, in a Private Residence.   Guns, weapons, or other means to harm oneself in the home? No  Pets at home? 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 Adam reports  No  to cultural considerations to take into account when providing treatment.     Gender identity:  Adam identifies as male and uses he/him/his pronouns.    Strengths & Coping Strategies:  Adam is knowledgeable and insightful about his mental health. He has a strong support system and is socially connected. He reports utilizing coping skills he's learned in therapy    Legal Hx: No    Trauma/Abuse Hx: No     Hx: No    Family Mental Health Hx- depression and anxiety on materanl and paternal sides         Psychiatric Medication Trials         RATING OF ANTIDEPRESSANT DRUGS:    Antidepressant treatment history using antidepressant resistant rating where available.    1.  Selective serotonin reuptake inhibitors (SSRIs):    a.  Escitalopram/lexapro:  From 02/2015 and then 03/2017.  He was on and off it with a max dose of 20 mg per day.  He had sexual side effects, and he said it was ineffective.  It would give him a rating of 4.  b.  Fluoxetine/Prozac:  From 05/2019 to 2021.  Max dose 60 mg per day.  It did not change the increase to 60 mg per day, so he is back to 40. Response is  questionable. It would give it a rating of 4.    The SSRIs are giving him some sexual side effects.    2.  Serotonin noradrenaline reuptake inhibitors (SNRIs): Negative.    3.  Serotonin modulators and stimulators:  Negative.    4. Noradrenaline and dopamine reuptake inhibitors (NDRIs):    a.  Bupropion:  From 01/2017 until 2021.  Presently on.  Max dose 450 mg per day.  Would give it a rating of 4.    5.  Tricyclic antidepressants (TCAs):  Negative.    6.  Tetracyclic antidepressants:  a.  Trazodone/Desyrel:  In 03/2018 until 2020.  Increased to 150 mg per day.  It made him too groggy when he used it for sleep.    7.  Monoamine oxidase inhibitors (MAOIs):  Negative.    8.  Augmentation therapy:    - Benzodiazepines:  a.  Lorazepam: From 09/2019 to present.  Max dose 0.5 mg per day.      9.  Miscellaneous:  a. Buspirone/BuSpar:  From 11/2019 to present.  Max dose 60 mg per day.  Is used for anxiety.  b. Gabapentin/Neurontin:  From 07/2019.  Max dose 900 mg per day. Was used for anxiety and nerve pain.  c. Hydroxyzine/Atarax: In 2019.  It made him tired.  d.  Propranolol:  From 04/2020 to present, 20 mg per day for anxiety and hypertension.  e.  Pramipexole/Mirapex: From 07/2021 to present. Up to 1.5 mg per day.  So far, no change.    10.  Miscellaneous sleep aids:    a.  Diphenhydramine/Benadryl:  Yes, and it helps.  b.  Melatonin was tried.  c.  Gabapentin was tried.  d.  Trazodone was tried.    11.  Ketamine history:  Negative.    12.  Other reported treatments for depression and related mood disorders:    a.  TMS:  Negative.  b.  ECT:  Negative.  c.  VNS:  Negative.  d.  Bright lights:  Negative.  e. CPAP:  Negative.             Medical / Surgical History     Patient Active Problem List   Diagnosis     CARDIOVASCULAR SCREENING; LDL GOAL LESS THAN 160     Depression with anxiety     Elevated BP without diagnosis of hypertension     Chronic right-sided low back pain with right-sided sciatica     Dysthymia      Kyphosis (acquired) (postural)     Poor posture     Thoracic segment dysfunction     Sacral pain     Somatic dysfunction of sacral region     Somatic dysfunction of lumbar region     Paresthesias     Generalized anxiety disorder     Alcohol abuse     Social anxiety disorder     PTSD (post-traumatic stress disorder)     Chronic right shoulder pain     Aftercare following surgery of the musculoskeletal system       Past Surgical History:   Procedure Laterality Date     ARTHROSCOPY SHOULDER SUPERIOR LABRUM ANTERIOR TO POSTERIOR REPAIR           Medical Review of Systems                                                                                                    Metals Screen   Yes No Item    x Implanted or lodged metals in body    x Implanted surgical devices    x Metal containing facial or scalp tattoos    x Non removable piercings   Seizure Screen  Yes No Item    x Current Seizure Disorder?    x History of Seizure?     Does patient have a cochlear implant? ___n_______  Does patient have any shunts?_______n____  Does patient have a pacemaker?_____n_____  Does patient have a vagus nerve stimulator?____n______  Does patient have a deep brain stimulator?____n______  Any other implanted device?________________n     Allergy   No known drug allergies     Current Medications     Current Outpatient Medications   Medication Sig Dispense Refill     buPROPion (WELLBUTRIN XL) 300 MG 24 hr tablet Take 1 tablet (300 mg) by mouth every morning 90 tablet 1     busPIRone HCl (BUSPAR) 30 MG tablet Take 1 tablet (30 mg) by mouth 2 times daily 180 tablet 1     FLUoxetine (PROZAC) 40 MG capsule Take 1 capsule (40 mg) by mouth daily 90 capsule 1     LORazepam (ATIVAN) 0.5 MG tablet Take 1 tablet (0.5 mg) by mouth daily as needed for anxiety 90 tablet 1     pramipexole (MIRAPEX) 0.125 MG tablet Taper up to 1.5 mg daily, add 0.125 mg to 1 mg dose for three days, then add 0.250 mg to 1 mg dose, then add 0.375 mg to 1 mg dose. 18 tablet  0     pramipexole (MIRAPEX) 1 MG tablet Take 1.5 mg PO daily after tapering up with 0.125 mg tablets 45 tablet 5     propranolol (INDERAL) 20 MG tablet Take 1 tablet (20 mg) by mouth daily 90 tablet 1        Vitals                                                                                                                             There were no vitals taken for this visit.      Mental Status Exam                                                                                        Alertness: alert  and oriented  Appearance: casually groomed  Behavior/Demeanor: cooperative, pleasant and calm, with good  eye contact   Speech: normal and regular rate and rhythm  Language: intact  Psychomotor: normal or unremarkable  Mood: depressed  Affect: restricted; was congruent to mood; was congruent to content  Thought Process/Associations: unremarkable  Thought Content:  Reports none;  Denies suicidal and violent ideation  Perception:  Reports none;  Denies auditory hallucinations  Insight: good  Judgment: good  Cognition: (6) oriented: time, person, and place  attention span: intact  concentration: intact  recent memory: intact  remote memory: intact  fund of knowledge: appropriate  Gait and Station: unremarkable     Labs and Data       PHQ9 Today:  7  PHQ 8/31/2021 11/30/2021 12/31/2021   PHQ-9 Total Score 4 4 7   Q9: Thoughts of better off dead/self-harm past 2 weeks Not at all Not at all Not at all         No lab results found.  No lab results found.    Diagnosis and Assessment                                                                                  Adam Fish is a 31 year old male with a history of depression that dates back to a an unpleasant experience with psilocybin 12 years ago at age 19. His current episode is either his second or third, and is currently mild to moderate severity. He reports no prior experiences with depression and has experiened little relief since then, up until this past fall  when he may have gone into something close to remission. It is most likely that this psychedlic experience unmasked underlying depressive diathesis, given the FH and subsequent course. Medication-wise, he has only tried only SSRIs with bupropion augmentation with slight benefit. His therapy experience appears mostly limited to supportive, which he says has been very helpful. He may benefit from a switch to an SNRI (venlafaxine duloxetine or vortioxetine are good choices). TMS would likely also be beneficial. ALthough he did not find CBT helpful the first time, I would recommend he try again.     Patient is burdened by his chronic symptoms of depression and his current episode has lasted over 2 months causing significant psychosocial dysfunction despite multiple trials of psychotropic medications and individual therapy. Due to remaining profound depression and numerous failed previous treatment modalities, the patient is a candidate for rTMS treament.     The risks, benefits, alternatives and potential adverse effects of the above have been explained and are understood by the patient. Adam Fish agrees to the treatment plan with the ability to do so. The pt knows to call the clinic for any problems or access emergency care if needed.   Medical considerations relevant to treatment are: NA  Substance concerns relevant to treatment are: Prior heavy EtOH use, none currently.     During the course of these treatments, the patient will be asked not to make any medication changes.   While waiting to initiate these treatments, it is absolutely reasonable to make medication changes, and suggestions (if any) are below.  After treatment is complete, the patient will transfer back to the referring provider.     Suicide Risk Assessment:  Today Adam Fish reports no SI, intent or plan. In addition, he has notable risk factors for self-harm, including male. However, risk is mitigated by no h/o suicide attempt, no plan  or intent, no h/o risky impulsive behavior, no access to lethal means, describes a safety plan, h/o seeking help when needed and none to minimal alcohol use . Therefore, based on all available evidence including the factors cited above, he does not appear to be at imminent risk for self-harm, does not meet criteria for a 72-hr hold, and therefore involuntary hospitalization will not be pursued at this  time.   Today the following issues were addressed:    1) Low mood  2) emptiness  3) listliness          Plan                                                                                                                          1) Major depressive disorder  -- Medications: Continue current outpatient psychotropic medications  Consider switch primary antidepressant to SNRI, consider lithium augmentation.   -- Psychotherapy: Continue regular individual psychotherapy   Retrial CBT  -- Procedures:    - rTMS   -sun lamp  -- Referrals: None      The patient understands to call 911 or go to the nearest ED if life threatening or urgent symptoms occur.            PROVIDER:  Burt Calhoun MD    Time based billing.  Time on day of service includes:  60min spent face to face with the patient   25 minutes pre-reviewing records  25 minutes preparing consultation note and follow up messages/documentation

## 2022-01-05 NOTE — PROGRESS NOTES
Adam is a 31 year old who is being evaluated via a billable video visit.      How would you like to obtain your AVS? MyChart  If the video visit is dropped, the invitation should be resent by: Text to cell phone: [phone]  Will anyone else be joining your video visit? No      Video Start Time: 2pm  Video-Visit Details    Type of service:  Video Visit    Video End Time:258pm    Originating Location (pt. Location): Home    Distant Location (provider location):  [facility] PSYCHIATRY     Platform used for Video Visit: Lucio

## 2022-01-14 PROBLEM — G89.29 CHRONIC RIGHT SHOULDER PAIN: Status: RESOLVED | Noted: 2020-06-26 | Resolved: 2022-01-14

## 2022-01-14 PROBLEM — M25.511 CHRONIC RIGHT SHOULDER PAIN: Status: RESOLVED | Noted: 2020-06-26 | Resolved: 2022-01-14

## 2022-01-14 PROBLEM — Z47.89 AFTERCARE FOLLOWING SURGERY OF THE MUSCULOSKELETAL SYSTEM: Status: RESOLVED | Noted: 2020-06-26 | Resolved: 2022-01-14

## 2022-01-14 NOTE — PROGRESS NOTES
Discharge Note    Progress reporting period is from initial eval to Jul 23, 2020.     Adam failed to return for next follow up visit and current status is unknown.  Please see information below for last relevant information on current status.  Patient seen for Rxs Used: 2 visits.    SUBJECTIVE  Subjective changes noted by patient:  Subjective: Overall reports feeling about the same, but does feel strength has improved. Having intermittent catching. .  Current pain level is  .     Previous pain level was   .   Changes in function:  Yes (See Goal flowsheet attached for changes in current functional level)  Adverse reaction to treatment or activity: None    OBJECTIVE  Changes noted in objective findings: Objective: Progressing to 90-90 and CKC rhythmic stab drills.    ASSESSMENT/PLAN  Diagnosis: R shoulder pain, new labral injury, 2 years s/p Bankart repair   DIAGP:  Diagnoses of Chronic right shoulder pain and Aftercare following surgery of the musculoskeletal system were pertinent to this visit.  Updated problem list and treatment plan:     Decreased strength - HEP    STG/LTGs have been met or progress has been made towards goals:  Yes, please see goal flowsheet for most current information.    Assessment of Progress: current status is unknown.  Last current status: Assessment of progress: Pt is progressing as expected.  Self Management Plans:  HEP    I have re-evaluated this patient and find that the nature, scope, duration and intensity of the therapy is appropriate for the medical condition of the patient.  Adam continues to require the following intervention to meet STG and LTG's:  HEP.    Recommendations:  Discharge with current home program.  Patient to follow up with MD as needed. Episode to be closed at this time and patient formally discharged from therapy.    Balaji Mcdaniels, PT, DPT, OCS      Please refer to the daily flowsheet for treatment today, total treatment time and time spent performing 1:1 timed  codes.

## 2022-02-04 NOTE — PROGRESS NOTES
Assessment & Plan     Exposure to heavy metals  Patient is currently using kratom for mental health and to alleviate anxiety.  Recent report from FDA indicated that patient to use kratom at a higher risk of having heavy metals in the body.  plan  - Cadmium level; Future  - Arsenic level; Future  - Lead Venous Blood Confirm; Future  - Mercury urine; Future  - Ferritin; Future  - Nickel Serum; Future  - Comprehensive metabolic panel (BMP + Alb, Alk Phos, ALT, AST, Total. Bili, TP); Future  - TSH; Future  - CBC with platelets; Future  - Hemoglobin A1c; Future    Major depressive disorder with single episode, in full remission (H)  Doing great using kratom only.    Encounter for hepatitis C screening test for low risk patient  - Hepatitis C antibody; Future    Chronic right-sided low back pain with right-sided sciatica  - [facility]erral; Future    Return in about 3 months (around 5/9/2022) for Recheck labs for heavy metals in 3 months..    Lulu Cope MD  [facility] is a 31 year old who presents for the following health issues:    HPI     Patient is requesting his blood be tested for lead, nickel & any other heavy metals. States he has been exposed for about a year and would like to get an idea of where his levels are at.    Concern - Kratom powder and levels of metals  Onset: 1 year ago  Description: powder for depression   Intensity: No s/s  Progression of Symptoms:  None at present  Accompanying Signs & Symptoms: None at present  Previous history of similar problem: None noted  Precipitating factors:        Worsened by: None  Alleviating factors:        Improved by: None  Therapies tried and outcome: None    2-3 times per week.   He started to use it 5-6 times per week.   No abdominal pain, nausea or vomiting. No chest pain.     Review of Systems   Constitutional, HEENT, cardiovascular, pulmonary, gi and gu systems are negative, except as otherwise  "noted.      Objective    /79   Pulse 54   Temp 97.7  F (36.5  C) (Temporal)   Ht 1.84 m (6' 0.44\")   Wt 98.5 kg (217 lb 1.6 oz)   SpO2 98%   BMI 29.09 kg/m    Body mass index is 29.09 kg/m .  Physical Exam   GENERAL: healthy, alert and no distress  NECK: no adenopathy, no asymmetry, masses, or scars and thyroid normal to palpation  RESP: lungs clear to auscultation - no rales, rhonchi or wheezes  CV: regular rate and rhythm, normal S1 S2, no S3 or S4, no murmur, click or rub, no peripheral edema and peripheral pulses strong  ABDOMEN: soft, nontender, no hepatosplenomegaly, no masses and bowel sounds normal  MS: no gross musculoskeletal defects noted, no edema    Results for orders placed or performed in visit on 02/09/22   CBC with platelets     Status: Abnormal   Result Value Ref Range    WBC Count 3.6 (L) 4.0 - 11.0 10e3/uL    RBC Count 4.52 4.40 - 5.90 10e6/uL    Hemoglobin 15.0 13.3 - 17.7 g/dL    Hematocrit 42.3 40.0 - 53.0 %    MCV 94 78 - 100 fL    MCH 33.2 (H) 26.5 - 33.0 pg    MCHC 35.5 31.5 - 36.5 g/dL    RDW 12.3 10.0 - 15.0 %    Platelet Count 188 150 - 450 10e3/uL   Hemoglobin A1c     Status: Normal   Result Value Ref Range    Hemoglobin A1C 5.1 0.0 - 5.6 %           "

## 2022-02-09 ENCOUNTER — OFFICE VISIT (OUTPATIENT)
Dept: FAMILY MEDICINE | Facility: CLINIC | Age: 32
End: 2022-02-09
Payer: COMMERCIAL

## 2022-02-09 VITALS
HEIGHT: 72 IN | WEIGHT: 217.1 LBS | HEART RATE: 54 BPM | SYSTOLIC BLOOD PRESSURE: 136 MMHG | TEMPERATURE: 97.7 F | DIASTOLIC BLOOD PRESSURE: 79 MMHG | OXYGEN SATURATION: 98 % | BODY MASS INDEX: 29.4 KG/M2

## 2022-02-09 DIAGNOSIS — F32.5 MAJOR DEPRESSIVE DISORDER WITH SINGLE EPISODE, IN FULL REMISSION (H): ICD-10-CM

## 2022-02-09 DIAGNOSIS — Z11.59 ENCOUNTER FOR HEPATITIS C SCREENING TEST FOR LOW RISK PATIENT: ICD-10-CM

## 2022-02-09 DIAGNOSIS — G89.29 CHRONIC RIGHT-SIDED LOW BACK PAIN WITH RIGHT-SIDED SCIATICA: ICD-10-CM

## 2022-02-09 DIAGNOSIS — Z77.018 EXPOSURE TO HEAVY METALS: Primary | ICD-10-CM

## 2022-02-09 DIAGNOSIS — M54.41 CHRONIC RIGHT-SIDED LOW BACK PAIN WITH RIGHT-SIDED SCIATICA: ICD-10-CM

## 2022-02-09 PROBLEM — F32.9 MAJOR DEPRESSION, SINGLE EPISODE: Status: ACTIVE | Noted: 2022-02-09

## 2022-02-09 LAB
ERYTHROCYTE [DISTWIDTH] IN BLOOD BY AUTOMATED COUNT: 12.3 % (ref 10–15)
HBA1C MFR BLD: 5.1 % (ref 0–5.6)
HCT VFR BLD AUTO: 42.3 % (ref 40–53)
HGB BLD-MCNC: 15 G/DL (ref 13.3–17.7)
MCH RBC QN AUTO: 33.2 PG (ref 26.5–33)
MCHC RBC AUTO-ENTMCNC: 35.5 G/DL (ref 31.5–36.5)
MCV RBC AUTO: 94 FL (ref 78–100)
PLATELET # BLD AUTO: 188 10E3/UL (ref 150–450)
RBC # BLD AUTO: 4.52 10E6/UL (ref 4.4–5.9)
WBC # BLD AUTO: 3.6 10E3/UL (ref 4–11)

## 2022-02-09 PROCEDURE — 36415 COLL VENOUS BLD VENIPUNCTURE: CPT | Performed by: FAMILY MEDICINE

## 2022-02-09 PROCEDURE — 99000 SPECIMEN HANDLING OFFICE-LAB: CPT | Performed by: FAMILY MEDICINE

## 2022-02-09 PROCEDURE — 82728 ASSAY OF FERRITIN: CPT | Performed by: FAMILY MEDICINE

## 2022-02-09 PROCEDURE — 83885 ASSAY OF NICKEL: CPT | Mod: 90 | Performed by: FAMILY MEDICINE

## 2022-02-09 PROCEDURE — 82300 ASSAY OF CADMIUM: CPT | Mod: 90 | Performed by: FAMILY MEDICINE

## 2022-02-09 PROCEDURE — 82175 ASSAY OF ARSENIC: CPT | Mod: 90 | Performed by: FAMILY MEDICINE

## 2022-02-09 PROCEDURE — 80053 COMPREHEN METABOLIC PANEL: CPT | Performed by: FAMILY MEDICINE

## 2022-02-09 PROCEDURE — 84443 ASSAY THYROID STIM HORMONE: CPT | Performed by: FAMILY MEDICINE

## 2022-02-09 PROCEDURE — 83036 HEMOGLOBIN GLYCOSYLATED A1C: CPT | Performed by: FAMILY MEDICINE

## 2022-02-09 PROCEDURE — 99214 OFFICE O/P EST MOD 30 MIN: CPT | Performed by: FAMILY MEDICINE

## 2022-02-09 PROCEDURE — 85027 COMPLETE CBC AUTOMATED: CPT | Performed by: FAMILY MEDICINE

## 2022-02-09 PROCEDURE — 83655 ASSAY OF LEAD: CPT | Mod: 90 | Performed by: FAMILY MEDICINE

## 2022-02-09 PROCEDURE — 86803 HEPATITIS C AB TEST: CPT | Performed by: FAMILY MEDICINE

## 2022-02-09 ASSESSMENT — MIFFLIN-ST. JEOR: SCORE: 1979.76

## 2022-02-10 LAB
ALBUMIN SERPL-MCNC: 4 G/DL (ref 3.4–5)
ALP SERPL-CCNC: 70 U/L (ref 40–150)
ALT SERPL W P-5'-P-CCNC: 52 U/L (ref 0–70)
ANION GAP SERPL CALCULATED.3IONS-SCNC: 2 MMOL/L (ref 3–14)
AST SERPL W P-5'-P-CCNC: 28 U/L (ref 0–45)
BILIRUB SERPL-MCNC: 0.8 MG/DL (ref 0.2–1.3)
BUN SERPL-MCNC: 12 MG/DL (ref 7–30)
CALCIUM SERPL-MCNC: 8.9 MG/DL (ref 8.5–10.1)
CHLORIDE BLD-SCNC: 109 MMOL/L (ref 94–109)
CO2 SERPL-SCNC: 28 MMOL/L (ref 20–32)
CREAT SERPL-MCNC: 0.97 MG/DL (ref 0.66–1.25)
FERRITIN SERPL-MCNC: 129 NG/ML (ref 26–388)
GFR SERPL CREATININE-BSD FRML MDRD: >90 ML/MIN/1.73M2
GLUCOSE BLD-MCNC: 79 MG/DL (ref 70–99)
HCV AB SERPL QL IA: NONREACTIVE
POTASSIUM BLD-SCNC: 4.3 MMOL/L (ref 3.4–5.3)
PROT SERPL-MCNC: 7.5 G/DL (ref 6.8–8.8)
SODIUM SERPL-SCNC: 139 MMOL/L (ref 133–144)
TSH SERPL DL<=0.005 MIU/L-ACNC: 1.42 MU/L (ref 0.4–4)

## 2022-02-11 ENCOUNTER — APPOINTMENT (OUTPATIENT)
Dept: LAB | Facility: CLINIC | Age: 32
End: 2022-02-11
Payer: COMMERCIAL

## 2022-02-11 PROCEDURE — 99000 SPECIMEN HANDLING OFFICE-LAB: CPT | Performed by: FAMILY MEDICINE

## 2022-02-11 PROCEDURE — 83825 ASSAY OF MERCURY: CPT | Mod: 90 | Performed by: FAMILY MEDICINE

## 2022-02-11 NOTE — RESULT ENCOUNTER NOTE
Dear Adam,   Here are your recent results. Your testing for diabetes, iron and thyroid functions were noted. Your white blood cell count is slightly lower than normal. Sometimes this is a normal genetic variation. I would recommend rechecking it in one year.     Best Regards   Lulu Cope MD

## 2022-02-12 LAB — NICKEL SERPL-MCNC: <2 UG/L

## 2022-02-13 ENCOUNTER — HEALTH MAINTENANCE LETTER (OUTPATIENT)
Age: 32
End: 2022-02-13

## 2022-02-13 LAB
ARSENIC BLD-MCNC: <10 UG/L
CADMIUM BLD-MCNC: <1 UG/L
LEAD BLDV-MCNC: <2 UG/DL

## 2022-02-17 LAB
CREAT 24H UR-MRATE: 1560 MG/D
CREAT UR-MCNC: 65 MG/DL
MERCURY 24H UR-MRATE: NORMAL UG/D
MERCURY UR-MCNC: <2.5 UG/L
MERCURY/CREAT UR: NORMAL UG/G CRT

## 2022-02-18 ENCOUNTER — MYC MEDICAL ADVICE (OUTPATIENT)
Dept: FAMILY MEDICINE | Facility: CLINIC | Age: 32
End: 2022-02-18
Payer: COMMERCIAL

## 2022-05-03 ENCOUNTER — TELEPHONE (OUTPATIENT)
Dept: PSYCHIATRY | Facility: CLINIC | Age: 32
End: 2022-05-03
Payer: COMMERCIAL

## 2022-05-03 NOTE — TELEPHONE ENCOUNTER
Writer spoke with Sarita at [employer].  Gave her start and end dates along with number of sessions requested for TMS.  She will call back with auth information.

## 2022-05-11 ENCOUNTER — ALLIED HEALTH/NURSE VISIT (OUTPATIENT)
Dept: PSYCHIATRY | Facility: CLINIC | Age: 32
End: 2022-05-11
Payer: COMMERCIAL

## 2022-05-11 ENCOUNTER — OFFICE VISIT (OUTPATIENT)
Dept: PSYCHIATRY | Facility: CLINIC | Age: 32
End: 2022-05-11
Payer: COMMERCIAL

## 2022-05-11 DIAGNOSIS — F33.2 SEVERE EPISODE OF RECURRENT MAJOR DEPRESSIVE DISORDER, WITHOUT PSYCHOTIC FEATURES (H): Primary | ICD-10-CM

## 2022-05-11 DIAGNOSIS — F33.1 MODERATE EPISODE OF RECURRENT MAJOR DEPRESSIVE DISORDER (H): Primary | ICD-10-CM

## 2022-05-11 ASSESSMENT — PATIENT HEALTH QUESTIONNAIRE - PHQ9: SUM OF ALL RESPONSES TO PHQ QUESTIONS 1-9: 9

## 2022-05-11 NOTE — PROGRESS NOTES
Interventional Psychiatry Program  [address], [address]  [place] [address]  TMS Procedure Note   Adam Fish MRN# [id number]  Age: 32 year old year old YOB: 1990    Pre-Procedure:  History and Physical: Reviewed in medical record  Consent Signed by: Adam Fish for this course of treatment.  On: 5/11/2022    Reviewed possible side effects associated with treatment as well as questions regarding possible course of treatments.  Patient agreed to procedure and was able to reflect implications.    Clinical Narrative:  Patient presents to initiate an acute course of TMS for management of his symptoms of resistant depression. Patient tolerated first treatment. Patient used his phone during treatment.    Indications for TMS:  MDD, recurrent, severe; 4+ medication trials (from 2+ classes) ineffective; Psychotherapy ineffective     Procedure Diagnosis:  MDD, recurrent, severe F33.2    Treatment Hx:  Treatment number this series:  1 and Total lifetime treatment number: 1    Allergies   Allergen Reactions     No Known Drug Allergies       There were no vitals taken for this visit.    Pause for the Cause  Right patient: Yes  Right procedure/correct coil:  Yes; rTMS; [id number]; H1 coil.   Earplugs in place:  Yes    Procedure  Patient was seated in procedure chair. Identity and procedure was verified. Ear plugs were placed in ears and patient-specific cap was placed on head and tightened appropriately. Ruler locations were verified. Bone conducting headphones were not used.  Coil was placed at 0 - eyebrow - 14 and stimulator was set to 43% (85% of MT).  Initial train was well tolerated so 55 trains were delivered.  Patient tolerated procedure well with minimal right eyebrow movement.    Motor Threshold Determination  Distance from nasion to inion: 37  MT 1: 0 - 5 - 14 @ 50% on 5/11/2022    Standard LDLPFC    Frequency: 18  Train Duration: 2  Total pulses delivered: 1980  Inter-train  interval: 20  Tx Loc: 0 - eyebrow - 14  Energy: 43% (85% MT)  Trains: 55       Date MADRS IDS-SR PHQ-9   5/11/2022 15 29 9       PHQ-9 SCORE 11/30/2021 12/31/2021 1/5/2022   PHQ-9 Total Score - - -   PHQ-9 Total Score MyChart - - -   PHQ-9 Total Score 4 7 7       Alexus Kuhn  [facility]   Mental [facility] Neuromodulation    Plan   -continue TMS  -increase energy as tolerated      I saw the patient before treatment and completed motor threshold re-determination as documented above and remained available in the clinic throughout the TMS session.      Essence Swain M.D.   [facility] Neuromodulation

## 2022-05-11 NOTE — PROGRESS NOTES
TMS Education     Adam Fish MRN# [id number]  Age: 32 year old year old YOB: 1990        Patient is here in clinic for TMS education and consenting.  Patient will be starting TMS today on the [employer].  Writer and patient reviewed mechanics of TMS.  Discussed using magnetic pulses to stimulate and induce an electrical field inside the brain.  Discussed the difference between F8 and H1 coil.  Patient was able to verbalize understanding of this.  Discussed that the idea is that we are treating the DLPFC of the brain, as it has been shown by in studies that people who have depression have decreased activity in this part of the brain, the idea is that we stimulate this part of the brain to increase activity.       Reviewed processing of mapping and how visit today would go.  Encouraged patient to communicate with staff if treatment at anytime became painful.         Discussed side effects of TMS.  Side effects include headaches after treatment, hearing loss, lightheadedness/dizziness, short lived vision changes, and seizures.  Discussed ways that TMS Clinic helps with preventing these side effects.  Such as retesting motor thresholds and having patient wear ear plugs.  Instructed patient that he can take OTC pain relievers such as tylenol or IBU if he has a headache after today's treatment.  Reviewed safety concerns regarding sleep and use of illegal drugs/alcohol.        Patient was able to ask questions regarding procedure.  Patient informed of 10 minute late policy and attendance policy.  Consent was signed by patient today.

## 2022-05-12 ENCOUNTER — OFFICE VISIT (OUTPATIENT)
Dept: PSYCHIATRY | Facility: CLINIC | Age: 32
End: 2022-05-12
Payer: COMMERCIAL

## 2022-05-12 DIAGNOSIS — F33.2 SEVERE EPISODE OF RECURRENT MAJOR DEPRESSIVE DISORDER, WITHOUT PSYCHOTIC FEATURES (H): Primary | ICD-10-CM

## 2022-05-12 ASSESSMENT — PATIENT HEALTH QUESTIONNAIRE - PHQ9: SUM OF ALL RESPONSES TO PHQ QUESTIONS 1-9: 8

## 2022-05-12 NOTE — PROGRESS NOTES
Interventional Psychiatry Program  [address], [address]  [place] [address]  TMS Procedure Note   Adam Fish MRN# [id number]  Age: 32 year old year old YOB: 1990    Pre-Procedure:  History and Physical: Reviewed in medical record  Consent Signed by: Adam Fish for this course of treatment.  On: 5/11/2022    Reviewed possible side effects associated with treatment as well as questions regarding possible course of treatments.  Patient agreed to procedure and was able to reflect implications.    Clinical Narrative:  Patient presents to initiate an acute course of TMS for management of his symptoms of resistant depression. Patient tolerated increase to 90%. Patient states he has no side effects from first treatment. Patient used his phone during treatment.    Indications for TMS:  MDD, recurrent, severe; 4+ medication trials (from 2+ classes) ineffective; Psychotherapy ineffective     Procedure Diagnosis:  MDD, recurrent, severe F33.2    Treatment Hx:  Treatment number this series: 2  Total lifetime treatment number: 2    Allergies   Allergen Reactions     No Known Drug Allergies       There were no vitals taken for this visit.    Pause for the Cause  Right patient: Yes  Right procedure/correct coil:  Yes; rTMS; [id number]; H1 coil.   Earplugs in place:  Yes    Procedure  Patient was seated in procedure chair. Identity and procedure was verified. Ear plugs were placed in ears and patient-specific cap was placed on head and tightened appropriately. Ruler locations were verified. Bone conducting headphones were not used.  Coil was placed at 0 - eyebrow - 14 and stimulator was set to parameters listed below.  Initial train was well tolerated so 55 trains were delivered.  Patient tolerated procedure well with minimal right eyebrow movement.    Motor Threshold Determination  Distance from nasion to inion: 37  MT 1: 0 - 5 - 14 @ 50% on 5/11/2022    Standard LDLPFC    Frequency:  18  Train Duration: 2  Total pulses delivered: 1980  Inter-train interval: 20  Tx Loc: 0 - eyebrow - 14  Energy: 45% (90% MT)  Trains: 55       Date MADRS IDS-SR PHQ-9   5/11/2022 15 29 9       PHQ-9 SCORE 1/5/2022 5/11/2022 5/12/2022   PHQ-9 Total Score - - -   PHQ-9 Total Score MyChart - - -   PHQ-9 Total Score 7 9 8       Alexus Kuhn, FIILBERTO  [facility]   Mental [facility] Neuromodulation    Plan   -continue TMS  -increase energy as tolerated      I did not see the patient during/after treatment but remained available in the clinic during  treatment.    Emeli Rivera MD  [facility] Neuromodulation

## 2022-05-13 ENCOUNTER — OFFICE VISIT (OUTPATIENT)
Dept: PSYCHIATRY | Facility: CLINIC | Age: 32
End: 2022-05-13
Payer: COMMERCIAL

## 2022-05-13 DIAGNOSIS — F33.2 SEVERE EPISODE OF RECURRENT MAJOR DEPRESSIVE DISORDER, WITHOUT PSYCHOTIC FEATURES (H): Primary | ICD-10-CM

## 2022-05-13 ASSESSMENT — PATIENT HEALTH QUESTIONNAIRE - PHQ9: SUM OF ALL RESPONSES TO PHQ QUESTIONS 1-9: 7

## 2022-05-13 NOTE — PROGRESS NOTES
Interventional Psychiatry Program  [address], [address]  [place] [address]  TMS Procedure Note   Adam Fish MRN# [id number]  Age: 32 year old year old YOB: 1990    Pre-Procedure:  History and Physical: Reviewed in medical record  Consent Signed by: Adam Fish for this course of treatment.  On: 5/11/2022    Reviewed possible side effects associated with treatment as well as questions regarding possible course of treatments.  Patient agreed to procedure and was able to reflect implications.    Clinical Narrative:  Patient presents to initiate an acute course of TMS for management of his symptoms of resistant depression. Patient tolerated increase to 93%.  Patient used his phone during treatment.    Indications for TMS:  MDD, recurrent, severe; 4+ medication trials (from 2+ classes) ineffective; Psychotherapy ineffective     Procedure Diagnosis:  MDD, recurrent, severe F33.2    Treatment Hx:  Treatment number this series: 3  Total lifetime treatment number: 3    Allergies   Allergen Reactions     No Known Drug Allergies       There were no vitals taken for this visit.    Pause for the Cause  Right patient: Yes  Right procedure/correct coil:  Yes; rTMS; [id number]; H1 coil.   Earplugs in place:  Yes    Procedure  Patient was seated in procedure chair. Identity and procedure was verified. Ear plugs were placed in ears and patient-specific cap was placed on head and tightened appropriately. Ruler locations were verified. Bone conducting headphones were not used.  Coil was placed at 0 - eyebrow - 14 and stimulator was set to parameters listed below.  Initial train was well tolerated so 55 trains were delivered.  Patient tolerated procedure well with minimal right eyebrow movement.    Motor Threshold Determination  Distance from nasion to inion: 37  MT 1: 0 - 5 - 14 @ 50% on 5/11/2022    Standard LDLPFC    Frequency: 18  Train Duration: 2  Total pulses delivered:  1980  Inter-train interval: 20  Tx Loc: 0 - eyebrow - 14  Energy: 42% (93% MT)  Trains: 55       Date MADRS IDS-SR PHQ-9   5/11/2022 15 29 9       PHQ-9 SCORE 1/5/2022 5/11/2022 5/12/2022   PHQ-9 Total Score - - -   PHQ-9 Total Score MyChart - - -   PHQ-9 Total Score 7 9 8       Alexus Kuhn, EMT  [facility]   Mental [facility] Neuromodulation    Plan   -continue TMS  -increase energy as tolerated      I did not see the patient during/after treatment but remained available in the clinic during  treatment.    Brandyn Monahan MD PhD  [facility] Neuromodulation

## 2022-05-16 ENCOUNTER — OFFICE VISIT (OUTPATIENT)
Dept: PSYCHIATRY | Facility: CLINIC | Age: 32
End: 2022-05-16
Payer: COMMERCIAL

## 2022-05-16 DIAGNOSIS — F33.2 SEVERE EPISODE OF RECURRENT MAJOR DEPRESSIVE DISORDER, WITHOUT PSYCHOTIC FEATURES (H): Primary | ICD-10-CM

## 2022-05-16 ASSESSMENT — PATIENT HEALTH QUESTIONNAIRE - PHQ9: SUM OF ALL RESPONSES TO PHQ QUESTIONS 1-9: 8

## 2022-05-16 NOTE — PROGRESS NOTES
Interventional Psychiatry Program  [address], [address]  [place] [address]  TMS Procedure Note   Adam Fish MRN# [id number]  Age: 32 year old year old YOB: 1990    Pre-Procedure:  History and Physical: Reviewed in medical record  Consent Signed by: Adam Fish for this course of treatment.  On: 5/11/2022    Reviewed possible side effects associated with treatment as well as questions regarding possible course of treatments.  Patient agreed to procedure and was able to reflect implications.    Clinical Narrative:  Patient presents to initiate an acute course of TMS for management of his symptoms of resistant depression. Patient tolerated increase to 95%.  Patient used his phone during treatment. He states he had a good weekend and got pizza.    Indications for TMS:  MDD, recurrent, severe; 4+ medication trials (from 2+ classes) ineffective; Psychotherapy ineffective     Procedure Diagnosis:  MDD, recurrent, severe F33.2    Treatment Hx:  Treatment number this series: 4  Total lifetime treatment number: 4    Allergies   Allergen Reactions     No Known Drug Allergies       There were no vitals taken for this visit.    Pause for the Cause  Right patient: Yes  Right procedure/correct coil:  Yes; rTMS; [id number]; H1 coil.   Earplugs in place:  Yes    Procedure  Patient was seated in procedure chair. Identity and procedure was verified. Ear plugs were placed in ears and patient-specific cap was placed on head and tightened appropriately. Ruler locations were verified. Bone conducting headphones were not used.  Coil was placed at 0 - eyebrow - 14 and stimulator was set to parameters listed below.  Initial train was well tolerated so 55 trains were delivered.  Patient tolerated procedure well with minimal right eyebrow movement.    Motor Threshold Determination  Distance from nasion to inion: 37  MT 1: 0 - 5 - 14 @ 50% on 5/11/2022    Standard LDLPFC    Frequency: 18  Train  Duration: 2  Total pulses delivered: 1980  Inter-train interval: 20  Tx Loc: 0 - eyebrow - 14  Energy: 48% (95% MT)  Trains: 55       Date MADRS IDS-SR PHQ-9   5/11/2022 15 29 9       PHQ-9 SCORE 5/11/2022 5/12/2022 5/13/2022   PHQ-9 Total Score - - -   PHQ-9 Total Score MyChart - - -   PHQ-9 Total Score 9 8 7       Alexus Kuhn, FILIBERTO  [facility]   Mental [facility] Neuromodulation    Plan   -continue TMS  -increase energy as tolerated      I did not see the patient during/after treatment but remained available in the clinic during  treatment.    Bebo Chávez MD  [facility] Neuromodulation

## 2022-05-17 ENCOUNTER — OFFICE VISIT (OUTPATIENT)
Dept: PSYCHIATRY | Facility: CLINIC | Age: 32
End: 2022-05-17
Payer: COMMERCIAL

## 2022-05-17 DIAGNOSIS — F33.2 SEVERE EPISODE OF RECURRENT MAJOR DEPRESSIVE DISORDER, WITHOUT PSYCHOTIC FEATURES (H): Primary | ICD-10-CM

## 2022-05-17 ASSESSMENT — PATIENT HEALTH QUESTIONNAIRE - PHQ9: SUM OF ALL RESPONSES TO PHQ QUESTIONS 1-9: 8

## 2022-05-17 NOTE — PROGRESS NOTES
Interventional Psychiatry Program  [address], [address]  [place] [address]  TMS Procedure Note   Adam Fish MRN# [id number]  Age: 32 year old year old YOB: 1990    Pre-Procedure:  History and Physical: Reviewed in medical record  Consent Signed by: Adam Fish for this course of treatment.  On: 5/11/2022    Reviewed possible side effects associated with treatment as well as questions regarding possible course of treatments.  Patient agreed to procedure and was able to reflect implications.    Clinical Narrative:  Patient presents to initiate an acute course of TMS for management of his symptoms of resistant depression. Patient tolerated increase to 100%. He may play basketball today.    Indications for TMS:  MDD, recurrent, severe; 4+ medication trials (from 2+ classes) ineffective; Psychotherapy ineffective     Procedure Diagnosis:  MDD, recurrent, severe F33.2    Treatment Hx:  Treatment number this series: 5  Total lifetime treatment number: 5    Allergies   Allergen Reactions     No Known Drug Allergies       There were no vitals taken for this visit.    Pause for the Cause  Right patient: Yes  Right procedure/correct coil:  Yes; rTMS; [id number]; H1 coil.   Earplugs in place:  Yes    Procedure  Patient was seated in procedure chair. Identity and procedure was verified. Ear plugs were placed in ears and patient-specific cap was placed on head and tightened appropriately. Ruler locations were verified. Bone conducting headphones were not used.  Coil was placed at 0 - eyebrow - 14 and stimulator was set to parameters listed below.  Initial train was well tolerated so 55 trains were delivered.  Patient tolerated procedure well with minimal right eyebrow movement.    Motor Threshold Determination  Distance from nasion to inion: 37  MT 1: 0 - 5 - 14 @ 50% on 5/11/2022    Standard LDLPFC    Frequency: 18  Train Duration: 2  Total pulses delivered: 1980  Inter-train  interval: 20  Tx Loc: 0 - eyebrow - 14  Energy: 50% (100% MT)  Trains: 55       Date MADRS IDS-SR PHQ-9   5/11/2022 15 29 9       PHQ-9 SCORE 5/13/2022 5/16/2022 5/17/2022   PHQ-9 Total Score - - -   PHQ-9 Total Score MyChart - - -   PHQ-9 Total Score 7 8 8       Alexus Kuhn, EMT  [facility]   Mental [facility] Neuromodulation    Plan   -continue TMS  -increase energy as tolerated      I did not see the patient during/after treatment but remained available in the clinic during  treatment.    Emeli Rivera MD  [facility] Neuromodulation

## 2022-05-18 ENCOUNTER — OFFICE VISIT (OUTPATIENT)
Dept: PSYCHIATRY | Facility: CLINIC | Age: 32
End: 2022-05-18
Payer: COMMERCIAL

## 2022-05-18 DIAGNOSIS — F33.2 SEVERE EPISODE OF RECURRENT MAJOR DEPRESSIVE DISORDER, WITHOUT PSYCHOTIC FEATURES (H): Primary | ICD-10-CM

## 2022-05-18 ASSESSMENT — PATIENT HEALTH QUESTIONNAIRE - PHQ9: SUM OF ALL RESPONSES TO PHQ QUESTIONS 1-9: 7

## 2022-05-18 NOTE — PROGRESS NOTES
Interventional Psychiatry Program  [address], [address]  [place] [address]  TMS Procedure Note   Adam Fish MRN# [id number]  Age: 32 year old year old YOB: 1990    Pre-Procedure:  History and Physical: Reviewed in medical record  Consent Signed by: Adam Fish for this course of treatment.  On: 5/11/2022    Reviewed possible side effects associated with treatment as well as questions regarding possible course of treatments.  Patient agreed to procedure and was able to reflect implications.    Clinical Narrative:  Patient presents to initiate an acute course of TMS for management of his symptoms of resistant depression. Patient tolerated increase to 105%. He had fun playing basketball last night.    Indications for TMS:  MDD, recurrent, severe; 4+ medication trials (from 2+ classes) ineffective; Psychotherapy ineffective     Procedure Diagnosis:  MDD, recurrent, severe F33.2    Treatment Hx:  Treatment number this series: 6  Total lifetime treatment number: 6    Allergies   Allergen Reactions     No Known Drug Allergies       There were no vitals taken for this visit.    Pause for the Cause  Right patient: Yes  Right procedure/correct coil:  Yes; rTMS; [id number]; H1 coil.   Earplugs in place:  Yes    Procedure  Patient was seated in procedure chair. Identity and procedure was verified. Ear plugs were placed in ears and patient-specific cap was placed on head and tightened appropriately. Ruler locations were verified. Bone conducting headphones were not used.  Coil was placed at 0 - eyebrow - 14 and stimulator was set to parameters listed below.  Initial train was well tolerated so 55 trains were delivered.  Patient tolerated procedure well with minimal right eyebrow movement.    Motor Threshold Determination  Distance from nasion to inion: 37  MT 1: 0 - 5 - 14 @ 50% on 5/11/2022    Standard LDLPFC    Frequency: 18  Train Duration: 2  Total pulses delivered:  1980  Inter-train interval: 20  Tx Loc: 0 - eyebrow - 14  Energy: 55% (105% MT)  Trains: 55       Date MADRS IDS-SR PHQ-9   5/11/2022 15 29 9       PHQ-9 SCORE 5/13/2022 5/16/2022 5/17/2022   PHQ-9 Total Score - - -   PHQ-9 Total Score MyChart - - -   PHQ-9 Total Score 7 8 8       Alexus Kuhn, EMT  [facility]   Mental [facility] Neuromodulation    Plan   -continue TMS  -increase energy as tolerated      I did not see the patient during/after treatment but remained available in the clinic during  treatment.    Burt Calhoun MD  [facility] Neuromodulation

## 2022-05-19 ENCOUNTER — OFFICE VISIT (OUTPATIENT)
Dept: PSYCHIATRY | Facility: CLINIC | Age: 32
End: 2022-05-19
Payer: COMMERCIAL

## 2022-05-19 DIAGNOSIS — F33.2 SEVERE EPISODE OF RECURRENT MAJOR DEPRESSIVE DISORDER, WITHOUT PSYCHOTIC FEATURES (H): Primary | ICD-10-CM

## 2022-05-19 ASSESSMENT — PATIENT HEALTH QUESTIONNAIRE - PHQ9: SUM OF ALL RESPONSES TO PHQ QUESTIONS 1-9: 9

## 2022-05-19 NOTE — PROGRESS NOTES
Interventional Psychiatry Program  [address] [facility] [address], [address]  [place] [address]  TMS Procedure Note   Adam Fish MRN# [id number]  Age: 32 year old year old YOB: 1990    Pre-Procedure:  History and Physical: Reviewed in medical record  Consent Signed by: Adam Fish for this course of treatment.  On: 5/11/2022    Reviewed possible side effects associated with treatment as well as questions regarding possible course of treatments.  Patient agreed to procedure and was able to reflect implications.    Clinical Narrative:  Patient presents to initiate an acute course of TMS for management of his symptoms of resistant depression. Patient tolerated increase to 110%.     Daily Adult Stimulation Safety Questionnaire: No or Yes in past 24 hours     1) Have you discontinued or started any new medication? No  2) Have you missed any doses of your medication differently then prescribed? No  3) Have you consumed alcohol or drugs (other than prescribed)?  No  4) Did you not sleep AT ALL last night?  No  5) Has your SI changed or worsened?  No    Indications for TMS:  MDD, recurrent, severe; 4+ medication trials (from 2+ classes) ineffective; Psychotherapy ineffective     Procedure Diagnosis:  MDD, recurrent, severe F33.2    Treatment Hx:  Treatment number this series: 7  Total lifetime treatment number: 7    Allergies   Allergen Reactions     No Known Drug Allergies       There were no vitals taken for this visit.    Pause for the Cause  Right patient: Yes  Right procedure/correct coil:  Yes; rTMS; [id number]; H1 coil.   Earplugs in place:  Yes    Procedure  Patient was seated in procedure chair. Identity and procedure was verified. Ear plugs were placed in ears and patient-specific cap was placed on head and tightened appropriately. Ruler locations were verified. Bone conducting headphones were not used.  Coil was placed at 0 - eyebrow - 14 and stimulator was set to parameters listed below.   Initial train was well tolerated so 55 trains were delivered.  Patient tolerated procedure well with  right facial movement.    Motor Threshold Determination  Distance from nasion to inion: 37  MT 1: 0 - 5 - 14 @ 50% on 5/11/2022    Standard LDLPFC    Frequency: 18  Train Duration: 2  Total pulses delivered: 1980  Inter-train interval: 20  Tx Loc: 0 - eyebrow - 14  Energy: 55% (110% MT)  Trains: 55       Date MADRS IDS-SR PHQ-9   5/11/2022 15 29 9       PHQ-9 SCORE 5/16/2022 5/17/2022 5/18/2022   PHQ-9 Total Score - - -   PHQ-9 Total Score MyChart - - -   PHQ-9 Total Score 8 8 7       Urmila Tom, Psychometrist  [facility]   [facility] Neu[facility]od[facility]    Plan   -continue TMS  -increase energy as tolerated      I did not see the patient during/after treatment but remained available in the clinic during  treatment.    Emeli Rivera MD  [facility] Neuromodulation

## 2022-05-20 ENCOUNTER — OFFICE VISIT (OUTPATIENT)
Dept: PSYCHIATRY | Facility: CLINIC | Age: 32
End: 2022-05-20
Payer: COMMERCIAL

## 2022-05-20 DIAGNOSIS — F33.2 SEVERE EPISODE OF RECURRENT MAJOR DEPRESSIVE DISORDER, WITHOUT PSYCHOTIC FEATURES (H): Primary | ICD-10-CM

## 2022-05-20 ASSESSMENT — PATIENT HEALTH QUESTIONNAIRE - PHQ9: SUM OF ALL RESPONSES TO PHQ QUESTIONS 1-9: 9

## 2022-05-20 NOTE — PROGRESS NOTES
Interventional Psychiatry Program  [address] [facility] [address], [address]  [place] [address]  TMS Procedure Note   Adam Fish MRN# [id number]  Age: 32 year old year old YOB: 1990    Pre-Procedure:  History and Physical: Reviewed in medical record  Consent Signed by: Adam Fish for this course of treatment.  On: 5/11/2022    Reviewed possible side effects associated with treatment as well as questions regarding possible course of treatments.  Patient agreed to procedure and was able to reflect implications.    Clinical Narrative:  Patient presents to initiate an acute course of TMS for management of his symptoms of resistant depression. Patient tolerated increase to 115%. Blue spacer added for comfort.    Daily Adult Stimulation Safety Questionnaire: No or Yes in past 24 hours     1) Have you discontinued or started any new medication? No  2) Have you missed any doses of your medication differently then prescribed? No  3) Have you consumed alcohol or drugs (other than prescribed)?  No  4) Did you not sleep AT ALL last night?  No  5) Has your SI changed or worsened?  No    Indications for TMS:  MDD, recurrent, severe; 4+ medication trials (from 2+ classes) ineffective; Psychotherapy ineffective     Procedure Diagnosis:  MDD, recurrent, severe F33.2    Treatment Hx:  Treatment number this series: 8  Total lifetime treatment number: 8    Allergies   Allergen Reactions     No Known Drug Allergies       There were no vitals taken for this visit.    Pause for the Cause  Right patient: Yes  Right procedure/correct coil:  Yes; rTMS; [id number]; H1 coil.   Earplugs in place:  Yes    Procedure  Patient was seated in procedure chair. Identity and procedure was verified. Ear plugs were placed in ears and patient-specific cap was placed on head and tightened appropriately. Ruler locations were verified. Bone conducting headphones were not used.  Coil was placed at 0 - eyebrow - 14 and stimulator was  set to parameters listed below.  Initial train was well tolerated so 55 trains were delivered.  Patient tolerated procedure well with  right facial movement.    Motor Threshold Determination  Distance from nasion to inion: 37  MT 1: 0 - 5 - 14 @ 50% on 5/11/2022    Standard LDLPFC    Frequency: 18  Train Duration: 2  Total pulses delivered: 1980  Inter-train interval: 20  Tx Loc: 0 - eyebrow - 14 *USES BLUE SPACER*   Energy: 55% (110% MT)  Trains: 55       Date MADRS IDS-SR PHQ-9   5/11/2022 15 29 9   5/20/2022  25 9       PHQ-9 SCORE 5/17/2022 5/18/2022 5/19/2022   PHQ-9 Total Score - - -   PHQ-9 Total Score MyChart - - -   PHQ-9 Total Score 8 7 9       Michelle Kuhn, EMT  [facility]   [facility] Neuromodulation    Plan   -continue TMS  -increase energy as tolerated      I did not see the patient during/after treatment but remained available in the clinic during  treatment.      Brandyn Monahan MD PhD  [facility] Neuromodulation

## 2022-05-23 ENCOUNTER — OFFICE VISIT (OUTPATIENT)
Dept: PSYCHIATRY | Facility: CLINIC | Age: 32
End: 2022-05-23
Payer: COMMERCIAL

## 2022-05-23 DIAGNOSIS — F33.2 SEVERE EPISODE OF RECURRENT MAJOR DEPRESSIVE DISORDER, WITHOUT PSYCHOTIC FEATURES (H): Primary | ICD-10-CM

## 2022-05-23 ASSESSMENT — PATIENT HEALTH QUESTIONNAIRE - PHQ9: SUM OF ALL RESPONSES TO PHQ QUESTIONS 1-9: 5

## 2022-05-23 NOTE — PROGRESS NOTES
Interventional Psychiatry Program  [address] [facility], [address]  [place] [address]  TMS Procedure Note   Adam Fish MRN# [id number]  Age: 32 year old year old YOB: 1990    Pre-Procedure:  History and Physical: Reviewed in medical record  Consent Signed by: Adam Fish for this course of treatment.  On: 5/11/2022    Reviewed possible side effects associated with treatment as well as questions regarding possible course of treatments.  Patient agreed to procedure and was able to reflect implications.    Clinical Narrative:  Patient presents to initiate an acute course of TMS for management of his symptoms of resistant depression. Patient tolerated increase to 120%. Blue spacer added for comfort. Patient had a good weekend, he played some golf and had some good food.    Daily Adult Stimulation Safety Questionnaire: No or Yes in past 24 hours     1) Have you discontinued or started any new medication? No  2) Have you missed any doses of your medication differently then prescribed? No  3) Have you consumed alcohol or drugs (other than prescribed)?  No  4) Did you not sleep AT ALL last night?  No  5) Has your SI changed or worsened?  No    Indications for TMS:  MDD, recurrent, severe; 4+ medication trials (from 2+ classes) ineffective; Psychotherapy ineffective     Procedure Diagnosis:  MDD, recurrent, severe F33.2    Treatment Hx:  Treatment number this series: 9  Total lifetime treatment number: 9    Allergies   Allergen Reactions     No Known Drug Allergies       There were no vitals taken for this visit.    Pause for the Cause  Right patient: Yes  Right procedure/correct coil:  Yes; rTMS; [id number]; H1 coil.   Earplugs in place:  Yes    Procedure  Patient was seated in procedure chair. Identity and procedure was verified. Noise cancelling earbuds were placed in ears and patient-specific cap was placed on head and tightened appropriately. Ruler locations were verified. Bone  conducting headphones were not used.  Coil was placed at 0 - eyebrow - 14 and stimulator was set to parameters listed below.  Initial train was well tolerated so 55 trains were delivered.  Patient tolerated procedure well with  right facial movement.    Motor Threshold Determination  Distance from nasion to inion: 37  MT 1: 0 - 5 - 14 @ 50% on 5/11/2022    Standard LDLPFC    Frequency: 18  Train Duration: 2  Total pulses delivered: 1980  Inter-train interval: 20  Tx Loc: 0 - eyebrow - 14 *USES BLUE SPACER*   Energy: 60% (120% MT)  Trains: 55       Date MADRS IDS-SR PHQ-9   5/11/2022 15 29 9   5/20/2022  25 9       PHQ-9 SCORE 5/18/2022 5/19/2022 5/20/2022   PHQ-9 Total Score - - -   PHQ-9 Total Score MyChart - - -   PHQ-9 Total Score 7 9 9       Urmila Tom, Psychometrist  [facility]   [facility] Neuromodulation    Plan   -continue TMS        I did not see patient but remained available in the clinic throughout the TMS session.        Bebo Chávez MD  [facility]  [facility] Neuromodulation

## 2022-05-24 ENCOUNTER — OFFICE VISIT (OUTPATIENT)
Dept: PSYCHIATRY | Facility: CLINIC | Age: 32
End: 2022-05-24
Payer: COMMERCIAL

## 2022-05-24 DIAGNOSIS — F33.2 SEVERE EPISODE OF RECURRENT MAJOR DEPRESSIVE DISORDER, WITHOUT PSYCHOTIC FEATURES (H): Primary | ICD-10-CM

## 2022-05-24 ASSESSMENT — PATIENT HEALTH QUESTIONNAIRE - PHQ9: SUM OF ALL RESPONSES TO PHQ QUESTIONS 1-9: 7

## 2022-05-24 NOTE — PROGRESS NOTES
Interventional Psychiatry Program  [address] [facility] [address], [address]  [place] [address]  TMS Procedure Note   Adam Fish MRN# [id number]  Age: 32 year old year old YOB: 1990    Pre-Procedure:  History and Physical: Reviewed in medical record  Consent Signed by: Adam Fish for this course of treatment.  On: 5/11/2022    Reviewed possible side effects associated with treatment as well as questions regarding possible course of treatments.  Patient agreed to procedure and was able to reflect implications.    Clinical Narrative:  Patient presents to initiate an acute course of TMS for management of his symptoms of resistant depression. No changes reported.    Daily Adult Stimulation Safety Questionnaire: No or Yes in past 24 hours     1) Have you discontinued or started any new medication? No  2) Have you missed any doses of your medication differently then prescribed? No  3) Have you consumed alcohol or drugs (other than prescribed)?  No  4) Did you not sleep AT ALL last night?  No  5) Has your SI changed or worsened?  No    Indications for TMS:  MDD, recurrent, severe; 4+ medication trials (from 2+ classes) ineffective; Psychotherapy ineffective     Procedure Diagnosis:  MDD, recurrent, severe F33.2    Treatment Hx:  Treatment number this series: 10  Total lifetime treatment number: 10    Allergies   Allergen Reactions     No Known Drug Allergies       There were no vitals taken for this visit.    Pause for the Cause  Right patient: Yes  Right procedure/correct coil:  Yes; rTMS; [id number]; H1 coil.   Earplugs in place:  Yes    Procedure  Patient was seated in procedure chair. Identity and procedure was verified. Noise cancelling earbuds were placed in ears and patient-specific cap was placed on head and tightened appropriately. Ruler locations were verified. Bone conducting headphones were not used.  Coil was placed at 0 - eyebrow - 14 and stimulator was set to parameters listed below.   Initial train was well tolerated so 55 trains were delivered.  Patient tolerated procedure well with  right facial movement.    Motor Threshold Determination  Distance from nasion to inion: 37  MT 1: 0 - 5 - 14 @ 50% on 5/11/2022    Standard LDLPFC    Frequency: 18  Train Duration: 2  Total pulses delivered: 1980  Inter-train interval: 20  Tx Loc: 0 - eyebrow - 14 *USES BLUE SPACER*   Energy: 60% (120% MT)  Trains: 55       Date MADRS IDS-SR PHQ-9   5/11/2022 15 29 9   5/20/2022  25 9       PHQ-9 SCORE 5/19/2022 5/20/2022 5/23/2022   PHQ-9 Total Score - - -   PHQ-9 Total Score MyChart - - -   PHQ-9 Total Score 9 9 5       FILIBERTO Alarcon  [facility]   Mental [facility] Neuromodulation    Plan   -continue TMS      I did not see the patient during/after treatment but remained available in the clinic during  treatment.    Emeli Rivera MD  [facility] Neuromodulation

## 2022-05-25 ENCOUNTER — OFFICE VISIT (OUTPATIENT)
Dept: PSYCHIATRY | Facility: CLINIC | Age: 32
End: 2022-05-25
Payer: COMMERCIAL

## 2022-05-25 DIAGNOSIS — F33.2 SEVERE EPISODE OF RECURRENT MAJOR DEPRESSIVE DISORDER, WITHOUT PSYCHOTIC FEATURES (H): Primary | ICD-10-CM

## 2022-05-25 ASSESSMENT — PATIENT HEALTH QUESTIONNAIRE - PHQ9: SUM OF ALL RESPONSES TO PHQ QUESTIONS 1-9: 8

## 2022-05-25 NOTE — PROGRESS NOTES
Interventional Psychiatry Program  [address], [address]  [place] [address]  TMS Procedure Note   Adam Fish MRN# [id number]  Age: 32 year old year old YOB: 1990    Pre-Procedure:  History and Physical: Reviewed in medical record  Consent Signed by: Adam Fish for this course of treatment.  On: 5/11/2022    Clinical Narrative:  Patient is tolerating treatment. No patient changes reported.    Daily Adult Stimulation Safety Questionnaire: No or Yes in past 24 hours     1) Have you discontinued or started any new medication? No  2) Have you missed any doses of your medication differently then prescribed? No  3) Have you consumed alcohol or drugs (other than prescribed)?  No  4) Did you not sleep AT ALL last night?  No  5) Has your SI changed or worsened?  No    Indications for TMS:  MDD, recurrent, severe; 4+ medication trials (from 2+ classes) ineffective; Psychotherapy ineffective     Procedure Diagnosis:  MDD, recurrent, severe F33.2    Treatment Hx:  Treatment number this series: 11  Total lifetime treatment number: 11    Allergies   Allergen Reactions     No Known Drug Allergies       There were no vitals taken for this visit.    Pause for the Cause  Right patient: Yes  Right procedure/correct coil:  Yes; rTMS; [id number]; H1 coil.   Earplugs in place:  Yes    Procedure  Patient was seated in procedure chair. Identity and procedure was verified. Noise cancelling earbuds were placed in ears and patient-specific cap was placed on head and tightened appropriately. Ruler locations were verified. Bone conducting headphones were not used.  Coil was placed at 0 - eyebrow - 14 and stimulator was set to parameters listed below.  Initial train was well tolerated so 55 trains were delivered.  Patient tolerated procedure well with  right facial movement.    Motor Threshold Determination  Distance from nasion to inion: 37  MT 1: 0 - 5 - 14 @ 50% on 5/11/2022    Standard  LDLPFC    Frequency: 18  Train Duration: 2  Total pulses delivered: 1980  Inter-train interval: 20  Tx Loc: 0 - eyebrow - 14 *USES BLUE SPACER*   Energy: 60% (120% MT)  Trains: 55       Date MADRS IDS-SR PHQ-9   5/11/2022 15 29 9   5/20/2022  25 9       PHQ-9 SCORE 5/23/2022 5/24/2022 5/25/2022   PHQ-9 Total Score - - -   PHQ-9 Total Score MyChart - - -   PHQ-9 Total Score 5 7 8       Braxton Coy, TMS Technician  [facility] Neuromodulation    Plan   -continue TMS    I did not see the patient but remained available in clinic throughout treatment.     Burt Calhoun MD  [facility] Neuromodulation

## 2022-05-26 ENCOUNTER — OFFICE VISIT (OUTPATIENT)
Dept: PSYCHIATRY | Facility: CLINIC | Age: 32
End: 2022-05-26
Payer: COMMERCIAL

## 2022-05-26 DIAGNOSIS — F33.2 SEVERE EPISODE OF RECURRENT MAJOR DEPRESSIVE DISORDER, WITHOUT PSYCHOTIC FEATURES (H): Primary | ICD-10-CM

## 2022-05-26 ASSESSMENT — PATIENT HEALTH QUESTIONNAIRE - PHQ9: SUM OF ALL RESPONSES TO PHQ QUESTIONS 1-9: 7

## 2022-05-26 NOTE — PROGRESS NOTES
Interventional Psychiatry Program  [address], [address]  [place] [address]  TMS Procedure Note   Adam Fish MRN# [id number]  Age: 32 year old year old YOB: 1990    Pre-Procedure:  History and Physical: Reviewed in medical record  Consent Signed by: Adam Fish for this course of treatment.  On: 5/11/2022    Clinical Narrative:  Patient is tolerating treatment. No patient changes reported.    Daily Adult Stimulation Safety Questionnaire: No or Yes in past 24 hours     1) Have you discontinued or started any new medication? No  2) Have you missed any doses of your medication differently then prescribed? No  3) Have you consumed alcohol or drugs (other than prescribed)?  No  4) Did you not sleep AT ALL last night?  No  5) Has your SI changed or worsened?  No    Indications for TMS:  MDD, recurrent, severe; 4+ medication trials (from 2+ classes) ineffective; Psychotherapy ineffective     Procedure Diagnosis:  MDD, recurrent, severe F33.2    Treatment Hx:  Treatment number this series: 12  Total lifetime treatment number: 12    Allergies   Allergen Reactions     No Known Drug Allergies       There were no vitals taken for this visit.    Pause for the Cause  Right patient: Yes  Right procedure/correct coil:  Yes; rTMS; [id number]; H1 coil.   Earplugs in place:  Yes    Procedure  Patient was seated in procedure chair. Identity and procedure was verified. Noise cancelling earbuds were placed in ears and patient-specific cap was placed on head and tightened appropriately. Ruler locations were verified. Bone conducting headphones were not used.  Coil was placed at 0 - eyebrow - 14 and stimulator was set to parameters listed below.  Initial train was well tolerated so 55 trains were delivered.  Patient tolerated procedure well with  right facial movement.    Motor Threshold Determination  Distance from nasion to inion: 37  MT 1: 0 - 5 - 14 @ 50% on 5/11/2022    Standard  LDLPFC    Frequency: 18  Train Duration: 2  Total pulses delivered: 1980  Inter-train interval: 20  Tx Loc: 0 - eyebrow - 14 *USES BLUE SPACER*   Energy: 60% (120% MT)  Trains: 55       Date MADRS IDS-SR PHQ-9   5/11/2022 15 29 9   5/20/2022  25 9       PHQ-9 SCORE 5/23/2022 5/24/2022 5/25/2022   PHQ-9 Total Score - - -   PHQ-9 Total Score MyChart - - -   PHQ-9 Total Score 5 7 8       Urmila Tom, Psychometrist  [facility] Neuromodulation    Plan   -continue TMS      I did not see the patient during/after treatment but remained available in the clinic during  treatment.    Emeli Rivera MD  [facility] Neuromodulation

## 2022-05-31 ENCOUNTER — OFFICE VISIT (OUTPATIENT)
Dept: PSYCHIATRY | Facility: CLINIC | Age: 32
End: 2022-05-31
Payer: COMMERCIAL

## 2022-05-31 DIAGNOSIS — F33.2 SEVERE EPISODE OF RECURRENT MAJOR DEPRESSIVE DISORDER, WITHOUT PSYCHOTIC FEATURES (H): Primary | ICD-10-CM

## 2022-05-31 ASSESSMENT — PATIENT HEALTH QUESTIONNAIRE - PHQ9: SUM OF ALL RESPONSES TO PHQ QUESTIONS 1-9: 9

## 2022-05-31 NOTE — PROGRESS NOTES
Interventional Psychiatry Program  [address] [facility], [address]  [place] [address]  TMS Procedure Note   Adam Fish MRN# [id number]  Age: 32 year old year old YOB: 1990    Pre-Procedure:  History and Physical: Reviewed in medical record  Consent Signed by: Adam Fish for this course of treatment.  On: 5/11/2022    Clinical Narrative:  Patient is tolerating treatment. No patient changes reported.    Daily Adult Stimulation Safety Questionnaire: No or Yes in past 24 hours     1) Have you discontinued or started any new medication? No  2) Have you missed any doses of your medication differently then prescribed? No  3) Have you consumed alcohol or drugs (other than prescribed)?  No  4) Did you not sleep AT ALL last night?  No  5) Has your SI changed or worsened?  No    Indications for TMS:  MDD, recurrent, severe; 4+ medication trials (from 2+ classes) ineffective; Psychotherapy ineffective     Procedure Diagnosis:  MDD, recurrent, severe F33.2    Treatment Hx:  Treatment number this series: 13  Total lifetime treatment number: 13    Allergies   Allergen Reactions     No Known Drug Allergies       There were no vitals taken for this visit.    Pause for the Cause  Right patient: Yes  Right procedure/correct coil:  Yes; rTMS; [id number]; H1 coil.   Earplugs in place:  Yes    Procedure  Patient was seated in procedure chair. Identity and procedure was verified. Noise cancelling earbuds were placed in ears and patient-specific cap was placed on head and tightened appropriately. Ruler locations were verified. Bone conducting headphones were not used.  Coil was placed at 0 - eyebrow - 14 and stimulator was set to parameters listed below.  Initial train was well tolerated so 55 trains were delivered.  Patient tolerated procedure well with  right facial movement.    Motor Threshold Determination  Distance from nasion to inion: 37  MT 1: 0 - 5 - 14 @ 50% on 5/11/2022    Stimulation  Parameters:  Frequency: 18  Train Duration: 2  Total pulses delivered: 1980  Inter-train interval: 20  Tx Loc: 0 - eyebrow - 14 *USES BLUE SPACER*   Energy: 60% (120% MT)  Trains: 55       Date MADRS IDS-SR PHQ-9   5/11/2022 15 29 9   5/20/2022  25 9       PHQ-9 SCORE 5/24/2022 5/25/2022 5/26/2022   PHQ-9 Total Score - - -   PHQ-9 Total Score MyChart - - -   PHQ-9 Total Score 7 8 7       Braxton Coy, TMS Technician  [facility] Neuromodulation    Plan   -continue TMS    I did not see the patient during/after treatment but remained available in the clinic during  treatment.    Emeli Rivera MD  [facility] Neuromodulation

## 2022-06-01 ENCOUNTER — ALLIED HEALTH/NURSE VISIT (OUTPATIENT)
Dept: PSYCHIATRY | Facility: CLINIC | Age: 32
End: 2022-06-01
Payer: COMMERCIAL

## 2022-06-01 ENCOUNTER — OFFICE VISIT (OUTPATIENT)
Dept: PSYCHIATRY | Facility: CLINIC | Age: 32
End: 2022-06-01

## 2022-06-01 DIAGNOSIS — F33.2 SEVERE EPISODE OF RECURRENT MAJOR DEPRESSIVE DISORDER, WITHOUT PSYCHOTIC FEATURES (H): Primary | ICD-10-CM

## 2022-06-01 ASSESSMENT — PATIENT HEALTH QUESTIONNAIRE - PHQ9: SUM OF ALL RESPONSES TO PHQ QUESTIONS 1-9: 7

## 2022-06-01 NOTE — PROGRESS NOTES
Interventional Psychiatry Program  [address], [address]  [place] [address]  TMS Procedure Note   Adam Fish MRN# [id number]  Age: 32 year old year old YOB: 1990    Pre-Procedure:  History and Physical: Reviewed in medical record  Consent Signed by: Adam Fish for this course of treatment.  On: 5/11/2022    Clinical Narrative:  Patient is tolerating treatment. No patient changes reported.    Daily Adult Stimulation Safety Questionnaire: No or Yes in past 24 hours     1) Have you discontinued or started any new medication? No  2) Have you missed any doses of your medication differently then prescribed? No  3) Have you consumed alcohol or drugs (other than prescribed)?  No  4) Did you not sleep AT ALL last night?  No  5) Has your SI changed or worsened?  No    Indications for TMS:  MDD, recurrent, severe; 4+ medication trials (from 2+ classes) ineffective; Psychotherapy ineffective     Procedure Diagnosis:  MDD, recurrent, severe F33.2    Treatment Hx:  Treatment number this series: 14  Total lifetime treatment number: 14    Allergies   Allergen Reactions     No Known Drug Allergies       There were no vitals taken for this visit.    Pause for the Cause  Right patient: Yes  Right procedure/correct coil:  Yes; rTMS; [id number]; H1 coil.   Earplugs in place:  Yes    Procedure  Patient was seated in procedure chair. Identity and procedure was verified. Noise cancelling earbuds were placed in ears and patient-specific cap was placed on head and tightened appropriately. Ruler locations were verified. Bone conducting headphones were not used.  Coil was placed at 0 - eyebrow - 14 and stimulator was set to parameters listed below.  Initial train was well tolerated so 55 trains were delivered.  Patient tolerated procedure well with  right facial movement.    Motor Threshold Determination  Distance from nasion to inion: 37  MT 1: 0 - 5 - 14 @ 50% on 5/11/2022    Stimulation  Parameters:  Frequency: 18  Train Duration: 2  Total pulses delivered: 1980  Inter-train interval: 20  Tx Loc: 0 - eyebrow - 14 *USES BLUE SPACER*   Energy: 60% (120% MT)  Trains: 55       Date MADRS IDS-SR PHQ-9   5/11/2022 15 29 9   5/20/2022  25 9       PHQ-9 SCORE 5/26/2022 5/31/2022 6/1/2022   PHQ-9 Total Score - - -   PHQ-9 Total Score MyChart - - -   PHQ-9 Total Score 7 9 7       Braxton Coy, TMS Technician  [facility] Neuromodulation    Plan   -continue TMS    I did not see the patient but remained available in clinic throughout treatment.     Burt Calhoun MD  [facility] Neuromodulation

## 2022-06-01 NOTE — PROGRESS NOTES
[facility] :  Care Coordination Note     SITUATION   Adam Fish is a 32 year old male who is receiving support for:  Clinic Care Coordination - Face To Face (3 Week TMS Check in )      BACKGROUND     14 sessions of TMS completed     ASSESSMENT     Patient reports TMS has been going well.  Denies s/e and states that he has been able to tolerate treatment well.  Patient reports that he has been able to tolerate the treatment well and has no concerns regarding the actual treatment.     Patient reports that he has maybe noticed a slight change in how he is feeling.  States there have been several mornings where he wakes up feeling more hopeful and less depressed.  However states that he has had some days as well that are still very difficult.           PLAN          Nursing Interventions: Education on TMS course    Follow-up plan: Cont TMS       Kati Padron RN

## 2022-06-02 ENCOUNTER — OFFICE VISIT (OUTPATIENT)
Dept: PSYCHIATRY | Facility: CLINIC | Age: 32
End: 2022-06-02
Payer: COMMERCIAL

## 2022-06-02 DIAGNOSIS — F33.2 SEVERE EPISODE OF RECURRENT MAJOR DEPRESSIVE DISORDER, WITHOUT PSYCHOTIC FEATURES (H): Primary | ICD-10-CM

## 2022-06-02 ASSESSMENT — PATIENT HEALTH QUESTIONNAIRE - PHQ9: SUM OF ALL RESPONSES TO PHQ QUESTIONS 1-9: 6

## 2022-06-02 NOTE — PROGRESS NOTES
Interventional Psychiatry Program  [address], [address]  [place] [address]  TMS Procedure Note   Adam Fish MRN# [id number]  Age: 32 year old year old YOB: 1990    Pre-Procedure:  History and Physical: Reviewed in medical record  Consent Signed by: Adam Fish for this course of treatment.  On: 5/11/2022    Clinical Narrative:  Patient is tolerating treatment. Reports feeling good about TMS.    Daily Adult Stimulation Safety Questionnaire: No or Yes in past 24 hours     1) Have you discontinued or started any new medication? No  2) Have you missed any doses of your medication differently then prescribed? No  3) Have you consumed alcohol or drugs (other than prescribed)?  No  4) Did you not sleep AT ALL last night?  No  5) Has your SI changed or worsened?  No    Indications for TMS:  MDD, recurrent, severe; 4+ medication trials (from 2+ classes) ineffective; Psychotherapy ineffective     Procedure Diagnosis:  MDD, recurrent, severe F33.2    Treatment Hx:  Treatment number this series: 15  Total lifetime treatment number: 15    Allergies   Allergen Reactions     No Known Drug Allergies       There were no vitals taken for this visit.    Pause for the Cause  Right patient: Yes  Right procedure/correct coil:  Yes; rTMS; [id number]; H1 coil.   Earplugs in place:  Yes    Procedure  Patient was seated in procedure chair. Identity and procedure was verified. Noise cancelling earbuds were placed in ears and patient-specific cap was placed on head and tightened appropriately. Ruler locations were verified. Bone conducting headphones were not used.  Coil was placed at 0 - eyebrow - 14 and stimulator was set to parameters listed below.  Initial train was well tolerated so 55 trains were delivered.  Patient tolerated procedure well with  right facial movement.    Motor Threshold Determination  Distance from nasion to inion: 37  MT 1: 0 - 5 - 14 @ 50% on 5/11/2022    Stimulation  Parameters:  Frequency: 18  Train Duration: 2  Total pulses delivered: 1980  Inter-train interval: 20  Tx Loc: 0 - eyebrow - 14 *USES BLUE SPACER*   Energy: 60% (120% MT)  Trains: 55       Date MADRS IDS-SR PHQ-9   5/11/2022 15 29 9   5/20/2022  25 9       PHQ-9 SCORE 5/31/2022 6/1/2022 6/2/2022   PHQ-9 Total Score - - -   PHQ-9 Total Score MyChart - - -   PHQ-9 Total Score 9 7 6     Michelle Kuhn, EMT  [facility]    Plan   -continue TMS    I did not see the patient during/after treatment but remained available in the clinic during  treatment.    Emeli Rivera MD  [facility]

## 2022-06-03 ENCOUNTER — OFFICE VISIT (OUTPATIENT)
Dept: PSYCHIATRY | Facility: CLINIC | Age: 32
End: 2022-06-03
Payer: COMMERCIAL

## 2022-06-03 DIAGNOSIS — F33.2 SEVERE EPISODE OF RECURRENT MAJOR DEPRESSIVE DISORDER, WITHOUT PSYCHOTIC FEATURES (H): Primary | ICD-10-CM

## 2022-06-03 ASSESSMENT — PATIENT HEALTH QUESTIONNAIRE - PHQ9: SUM OF ALL RESPONSES TO PHQ QUESTIONS 1-9: 8

## 2022-06-03 NOTE — PROGRESS NOTES
Interventional Psychiatry Program  [address] [address] [facility] [address], [address]  [place] [address]  TMS Procedure Note   Adam Fish MRN# [id number]  Age: 32 year old year old YOB: 1990    Pre-Procedure:  History and Physical: Reviewed in medical record  Consent Signed by: Adam Fish for this course of treatment.  On: 5/11/2022    Adam Fish comes into clinic today at the request of Burt Calhoun MD, ordering provider for TMS.    Clinical Narrative:  Patient is tolerating treatment. He reports being excited to see his family tonight.    Daily Adult Stimulation Safety Questionnaire: No or Yes in past 24 hours     1) Have you discontinued or started any new medication? No  2) Have you missed any doses of your medication differently then prescribed? No  3) Have you consumed alcohol or drugs (other than prescribed)?  No  4) Did you not sleep AT ALL last night?  No  5) Has your SI changed or worsened?  No    Indications for TMS:  MDD, recurrent, severe; 4+ medication trials (from 2+ classes) ineffective; Psychotherapy ineffective     Procedure Diagnosis:  MDD, recurrent, severe F33.2    Treatment Hx:  Treatment number this series: 16  Total lifetime treatment number: 16    Allergies   Allergen Reactions     No Known Drug Allergies       There were no vitals taken for this visit.    Pause for the Cause  Right patient: Yes  Right procedure/correct coil:  Yes; rTMS; [id number]; H1 coil.   Earplugs in place:  Yes    Procedure  Patient was seated in procedure chair. Identity and procedure was verified. Noise cancelling earbuds were placed in ears and patient-specific cap was placed on head and tightened appropriately. Ruler locations were verified. Bone conducting headphones were not used.  Coil was placed at 0 - eyebrow - 14 and stimulator was set to parameters listed below.  Initial train was well tolerated so 55 trains were delivered.  Patient tolerated procedure well with  right facial  movement.    Motor Threshold Determination  Distance from nasion to inion: 37  MT 1: 0 - 5 - 14 @ 50% on 5/11/2022    Stimulation Parameters:  Frequency: 18  Train Duration: 2  Total pulses delivered: 1980  Inter-train interval: 20  Tx Loc: 0 - eyebrow - 14 *USES BLUE SPACER*   Energy: 60% (120% MT)  Trains: 55       Date MADRS IDS-SR PHQ-9   5/11/2022 15 29 9   5/20/2022  25 9   6/3/2022  34 8       PHQ-9 SCORE 5/31/2022 6/1/2022 6/2/2022   PHQ-9 Total Score - - -   PHQ-9 Total Score MyChart - - -   PHQ-9 Total Score 9 7 6     Plan   -continue TMS    This service provided today was under the supervising provider of the day, Burt Calhoun MD, who was available if needed.    Michelle Kuhn, EMT  [facility]   [facility]

## 2022-06-06 ENCOUNTER — OFFICE VISIT (OUTPATIENT)
Dept: PSYCHIATRY | Facility: CLINIC | Age: 32
End: 2022-06-06
Payer: COMMERCIAL

## 2022-06-06 DIAGNOSIS — F33.2 SEVERE EPISODE OF RECURRENT MAJOR DEPRESSIVE DISORDER, WITHOUT PSYCHOTIC FEATURES (H): Primary | ICD-10-CM

## 2022-06-06 ASSESSMENT — PATIENT HEALTH QUESTIONNAIRE - PHQ9: SUM OF ALL RESPONSES TO PHQ QUESTIONS 1-9: 7

## 2022-06-06 NOTE — PROGRESS NOTES
Interventional Psychiatry Program  [address] [facility] [address], [address]  [place] [address]  TMS Procedure Note   Adam Fish MRN# [id number]  Age: 32 year old year old YOB: 1990    Pre-Procedure:  History and Physical: Reviewed in medical record  Consent Signed by: Adam Fish for this course of treatment.  On: 5/11/2022    Clinical Narrative:  Patient is tolerating treatment. He reports having a good weekend.     Daily Adult Stimulation Safety Questionnaire: No or Yes in past 24 hours     1) Have you discontinued or started any new medication? No  2) Have you missed any doses of your medication differently then prescribed? No  3) Have you consumed alcohol or drugs (other than prescribed)?  No  4) Did you not sleep AT ALL last night?  No  5) Has your SI changed or worsened?  No    Indications for TMS:  MDD, recurrent, severe; 4+ medication trials (from 2+ classes) ineffective; Psychotherapy ineffective     Procedure Diagnosis:  MDD, recurrent, severe F33.2    Treatment Hx:  Treatment number this series: 17  Total lifetime treatment number: 17    Allergies   Allergen Reactions     No Known Drug Allergies       There were no vitals taken for this visit.    Pause for the Cause  Right patient: Yes  Right procedure/correct coil:  Yes; rTMS; [id number]; H1 coil.   Earplugs in place:  Yes    Procedure  Patient was seated in procedure chair. Identity and procedure was verified. Noise cancelling earbuds were placed in ears and patient-specific cap was placed on head and tightened appropriately. Ruler locations were verified. Bone conducting headphones were not used.  Coil was placed at 0 - eyebrow - 14 and stimulator was set to parameters listed below.  Initial train was well tolerated so 55 trains were delivered.  Patient tolerated procedure well with  right facial movement.    Motor Threshold Determination  Distance from nasion to inion: 37  MT 1: 0 - 5 - 14 @ 50% on 5/11/2022    Stimulation  Parameters:  Frequency: 18  Train Duration: 2  Total pulses delivered: 1980  Inter-train interval: 20  Tx Loc: 0 - eyebrow - 14 *USES BLUE SPACER*   Energy: 60% (120% MT)  Trains: 55       Date MADRS IDS-SR PHQ-9   5/11/2022 15 29 9   5/20/2022  25 9   6/3/2022  34 8       PHQ-9 SCORE 6/1/2022 6/2/2022 6/3/2022   PHQ-9 Total Score - - -   PHQ-9 Total Score MyChart - - -   PHQ-9 Total Score 7 6 8     Michelle Kuhn, EMT  [facility]   [facility]    Plan   -continue TMS    I did not see patient but remained available in the clinic throughout the TMS session.      Bebo Chávez MD  [facility]

## 2022-06-08 ENCOUNTER — OFFICE VISIT (OUTPATIENT)
Dept: PSYCHIATRY | Facility: CLINIC | Age: 32
End: 2022-06-08
Payer: COMMERCIAL

## 2022-06-08 DIAGNOSIS — F33.2 SEVERE EPISODE OF RECURRENT MAJOR DEPRESSIVE DISORDER, WITHOUT PSYCHOTIC FEATURES (H): Primary | ICD-10-CM

## 2022-06-08 ASSESSMENT — PATIENT HEALTH QUESTIONNAIRE - PHQ9: SUM OF ALL RESPONSES TO PHQ QUESTIONS 1-9: 7

## 2022-06-08 NOTE — PROGRESS NOTES
Interventional Psychiatry Program  [address], [address]  [place] [address]  TMS Procedure Note   Adam Fish MRN# [id number]  Age: 32 year old year old YOB: 1990    Pre-Procedure:  History and Physical: Reviewed in medical record  Consent Signed by: Adam Fish for this course of treatment.  On: 5/11/2022    Clinical Narrative:  Patient is tolerating treatment. He reports feeling sick yesterday but slightly better today.    Daily Adult Stimulation Safety Questionnaire: No or Yes in past 24 hours     1) Have you discontinued or started any new medication? No  2) Have you missed any doses of your medication differently then prescribed? No  3) Have you consumed alcohol or drugs (other than prescribed)?  No  4) Did you not sleep AT ALL last night?  No  5) Has your SI changed or worsened?  No    Indications for TMS:  MDD, recurrent, severe; 4+ medication trials (from 2+ classes) ineffective; Psychotherapy ineffective     Procedure Diagnosis:  MDD, recurrent, severe F33.2    Treatment Hx:  Treatment number this series: 18  Total lifetime treatment number: 18    Allergies   Allergen Reactions     No Known Drug Allergies       There were no vitals taken for this visit.    Pause for the Cause  Right patient: Yes  Right procedure/correct coil:  Yes; rTMS; [id number]; H1 coil.   Earplugs in place:  Yes    Procedure  Patient was seated in procedure chair. Identity and procedure was verified. Noise cancelling earbuds were placed in ears and patient-specific cap was placed on head and tightened appropriately. Ruler locations were verified. Bone conducting headphones were not used.  Coil was placed at 0 - eyebrow - 14 and stimulator was set to parameters listed below.  Initial train was well tolerated so 55 trains were delivered.  Patient tolerated procedure well with  right facial movement.    Motor Threshold Determination  Distance from nasion to inion: 37  MT 1: 0 - 5 - 14 @ 50% on  5/11/2022    Stimulation Parameters:  Frequency: 18  Train Duration: 2  Total pulses delivered: 1980  Inter-train interval: 20  Tx Loc: 0 - eyebrow - 14 *USES BLUE SPACER*   Energy: 60% (120% MT)  Trains: 55       Date MADRS IDS-SR PHQ-9   5/11/2022 15 29 9   5/20/2022  25 9   6/3/2022  34 8       PHQ-9 SCORE 6/3/2022 6/6/2022 6/8/2022   PHQ-9 Total Score - - -   PHQ-9 Total Score MyChart - - -   PHQ-9 Total Score 8 7 7     Michelle Kuhn, EMT  [facility]    Plan   -continue TMS    I did not see the patient during/after treatment but remained available in the clinic during  treatment.      Brandyn Monahan MD PhD  [facility]

## 2022-06-09 ENCOUNTER — OFFICE VISIT (OUTPATIENT)
Dept: PSYCHIATRY | Facility: CLINIC | Age: 32
End: 2022-06-09
Payer: COMMERCIAL

## 2022-06-09 DIAGNOSIS — F33.2 SEVERE EPISODE OF RECURRENT MAJOR DEPRESSIVE DISORDER, WITHOUT PSYCHOTIC FEATURES (H): Primary | ICD-10-CM

## 2022-06-09 ASSESSMENT — PATIENT HEALTH QUESTIONNAIRE - PHQ9: SUM OF ALL RESPONSES TO PHQ QUESTIONS 1-9: 8

## 2022-06-09 NOTE — PROGRESS NOTES
Interventional Psychiatry Program  [address] [facility] [address] [facility], [address]  [place] [address]  TMS Procedure Note   Adam Fish MRN# [id number]  Age: 32 year old year old YOB: 1990    Pre-Procedure:  History and Physical: Reviewed in medical record  Consent Signed by: Adam Fish for this course of treatment.  On: 5/11/2022    Clinical Narrative:  Patient is tolerating treatment. Plans to golf today.    Daily Adult Stimulation Safety Questionnaire: No or Yes in past 24 hours     1) Have you discontinued or started any new medication? No  2) Have you missed any doses of your medication differently then prescribed? No  3) Have you consumed alcohol or drugs (other than prescribed)?  No  4) Did you not sleep AT ALL last night?  No  5) Has your SI changed or worsened?  No    Indications for TMS:  MDD, recurrent, severe; 4+ medication trials (from 2+ classes) ineffective; Psychotherapy ineffective     Procedure Diagnosis:  MDD, recurrent, severe F33.2    Treatment Hx:  Treatment number this series: 19  Total lifetime treatment number: 19    Allergies   Allergen Reactions     No Known Drug Allergies       There were no vitals taken for this visit.    Pause for the Cause  Right patient: Yes  Right procedure/correct coil:  Yes; rTMS; [id number]; H1 coil.   Earplugs in place:  Yes    Procedure  Patient was seated in procedure chair. Identity and procedure was verified. Noise cancelling earbuds were placed in ears and patient-specific cap was placed on head and tightened appropriately. Ruler locations were verified. Bone conducting headphones were not used.  Coil was placed at 0 - eyebrow - 14 and stimulator was set to parameters listed below.  Initial train was well tolerated so 55 trains were delivered.  Patient tolerated procedure well with  right facial movement.    Motor Threshold Determination  Distance from nasion to inion: 37  MT 1: 0 - 5 - 14 @ 50% on 5/11/2022    Stimulation  Parameters:  Frequency: 18  Train Duration: 2  Total pulses delivered: 1980  Inter-train interval: 20  Tx Loc: 0 - eyebrow - 14 *USES BLUE SPACER*   Energy: 60% (120% MT)  Trains: 55       Date MADRS IDS-SR PHQ-9   5/11/2022 15 29 9   5/20/2022  25 9   6/3/2022  34 8       PHQ-9 SCORE 6/3/2022 6/6/2022 6/8/2022   PHQ-9 Total Score - - -   PHQ-9 Total Score MyChart - - -   PHQ-9 Total Score 8 7 7     Michelle Kuhn, EMT  [facility]    Plan   -continue TMS      I did not see the patient during/after treatment but remained available in the clinic during  treatment.    Emeli Rivera MD  [facility]

## 2022-06-10 ENCOUNTER — OFFICE VISIT (OUTPATIENT)
Dept: PSYCHIATRY | Facility: CLINIC | Age: 32
End: 2022-06-10
Payer: COMMERCIAL

## 2022-06-10 DIAGNOSIS — F33.2 SEVERE EPISODE OF RECURRENT MAJOR DEPRESSIVE DISORDER, WITHOUT PSYCHOTIC FEATURES (H): Primary | ICD-10-CM

## 2022-06-10 ASSESSMENT — PATIENT HEALTH QUESTIONNAIRE - PHQ9: SUM OF ALL RESPONSES TO PHQ QUESTIONS 1-9: 8

## 2022-06-10 NOTE — PROGRESS NOTES
Interventional Psychiatry Program  [address] [facility], [address]  [place] [address]  TMS Procedure Note   Adam Fish MRN# [id number]  Age: 32 year old year old YOB: 1990    Pre-Procedure:  History and Physical: Reviewed in medical record  Consent Signed by: Adam Fish for this course of treatment.  On: 5/11/2022    Clinical Narrative:  Patient is tolerating treatment. Patient is attending his friend's bachelor party over the weekend.     Daily Adult Stimulation Safety Questionnaire: No or Yes in past 24 hours     1) Have you discontinued or started any new medication? No  2) Have you missed any doses of your medication differently then prescribed? No  3) Have you consumed alcohol or drugs (other than prescribed)?  No  4) Did you not sleep AT ALL last night?  No  5) Has your SI changed or worsened?  No    Indications for TMS:  MDD, recurrent, severe; 4+ medication trials (from 2+ classes) ineffective; Psychotherapy ineffective     Procedure Diagnosis:  MDD, recurrent, severe F33.2    Treatment Hx:  Treatment number this series: 20  Total lifetime treatment number: 20    Allergies   Allergen Reactions     No Known Drug Allergies       There were no vitals taken for this visit.    Pause for the Cause  Right patient: Yes  Right procedure/correct coil:  Yes; rTMS; [id number]; H1 coil.   Earplugs in place:  Yes    Procedure  Patient was seated in procedure chair. Identity and procedure was verified. Noise cancelling earbuds were placed in ears and patient-specific cap was placed on head and tightened appropriately. Ruler locations were verified. Bone conducting headphones were not used.  Coil was placed at 0 - eyebrow - 14 and stimulator was set to parameters listed below.  Initial train was well tolerated so 55 trains were delivered.  Patient tolerated procedure well with  right facial movement.    Motor Threshold Determination  Distance from nasion to inion: 37  MT 1: 0 - 5 - 14 @  50% on 5/11/2022    Stimulation Parameters:  Frequency: 18  Train Duration: 2  Total pulses delivered: 1980  Inter-train interval: 20  Tx Loc: 0 - eyebrow - 14 *USES BLUE SPACER*   Energy: 60% (120% MT)  Trains: 55       Date MADRS IDS-SR PHQ-9   5/11/2022 15 29 9   5/20/2022  25 9   6/3/2022  34 8   06/10/2022  33 8       PHQ-9 SCORE 6/6/2022 6/8/2022 6/9/2022   PHQ-9 Total Score - - -   PHQ-9 Total Score MyChart - - -   PHQ-9 Total Score 7 7 8     Braxton Coy, TMS Technician  [facility]    Plan   -continue TMS    I did not see the patient during/after treatment but remained available in the clinic during  treatment.      Brandyn Monahan MD  [facility]

## 2022-06-13 ENCOUNTER — OFFICE VISIT (OUTPATIENT)
Dept: PSYCHIATRY | Facility: CLINIC | Age: 32
End: 2022-06-13
Payer: COMMERCIAL

## 2022-06-13 ENCOUNTER — DOCUMENTATION ONLY (OUTPATIENT)
Dept: LAB | Facility: CLINIC | Age: 32
End: 2022-06-13
Payer: COMMERCIAL

## 2022-06-13 DIAGNOSIS — F33.2 SEVERE EPISODE OF RECURRENT MAJOR DEPRESSIVE DISORDER, WITHOUT PSYCHOTIC FEATURES (H): Primary | ICD-10-CM

## 2022-06-13 DIAGNOSIS — Z77.018 EXPOSURE TO HEAVY METALS: Primary | ICD-10-CM

## 2022-06-13 ASSESSMENT — PATIENT HEALTH QUESTIONNAIRE - PHQ9: SUM OF ALL RESPONSES TO PHQ QUESTIONS 1-9: 6

## 2022-06-13 NOTE — PROGRESS NOTES
Interventional Psychiatry Program  [address] [facility], [address]  [place] [address]  TMS Procedure Note   Adam Fish MRN# [id number]  Age: 32 year old year old YOB: 1990    Pre-Procedure:  History and Physical: Reviewed in medical record  Consent Signed by: Adam Fish for this course of treatment.  On: 5/11/2022    Clinical Narrative:  Patient is tolerating treatment. He reports having a good weekend.    Daily Adult Stimulation Safety Questionnaire: No or Yes in past 24 hours     1) Have you discontinued or started any new medication? No  2) Have you missed any doses of your medication differently then prescribed? No  3) Have you consumed alcohol or drugs (other than prescribed)?  No  4) Did you not sleep AT ALL last night?  No  5) Has your SI changed or worsened?  No    Indications for TMS:  MDD, recurrent, severe; 4+ medication trials (from 2+ classes) ineffective; Psychotherapy ineffective     Procedure Diagnosis:  MDD, recurrent, severe F33.2    Treatment Hx:  Treatment number this series: 21  Total lifetime treatment number: 21    Allergies   Allergen Reactions     No Known Drug Allergies       There were no vitals taken for this visit.    Pause for the Cause  Right patient: Yes  Right procedure/correct coil:  Yes; rTMS; [id number]; H1 coil.   Earplugs in place:  Yes    Procedure  Patient was seated in procedure chair. Identity and procedure was verified. Noise cancelling earbuds were placed in ears and patient-specific cap was placed on head and tightened appropriately. Ruler locations were verified. Bone conducting headphones were not used.  Coil was placed at 0 - eyebrow - 14 and stimulator was set to parameters listed below.  Initial train was well tolerated so 55 trains were delivered.  Patient tolerated procedure well with  right facial movement.    Motor Threshold Determination  Distance from nasion to inion: 37  MT 1: 0 - 5 - 14 @ 50% on 5/11/2022    Stimulation  Parameters:  Frequency: 18  Train Duration: 2  Total pulses delivered: 1980  Inter-train interval: 20  Tx Loc: 0 - eyebrow - 14 *USES BLUE SPACER*   Energy: 60% (120% MT)  Trains: 55       Date MADRS IDS-SR PHQ-9   5/11/2022 15 29 9   5/20/2022  25 9   6/3/2022  34 8   06/10/2022  33 8       PHQ-9 SCORE 6/8/2022 6/9/2022 6/10/2022   PHQ-9 Total Score - - -   PHQ-9 Total Score MyChart - - -   PHQ-9 Total Score 7 8 8     Michelle Kuhn, EMT  [facility]    Plan   -continue TMS    I did not see the patient during/after treatment but remained available in the clinic during  treatment.      Bebo Chávez MD  [facility]od[facility]

## 2022-06-14 ENCOUNTER — OFFICE VISIT (OUTPATIENT)
Dept: PSYCHIATRY | Facility: CLINIC | Age: 32
End: 2022-06-14
Payer: COMMERCIAL

## 2022-06-14 DIAGNOSIS — F33.2 SEVERE EPISODE OF RECURRENT MAJOR DEPRESSIVE DISORDER, WITHOUT PSYCHOTIC FEATURES (H): Primary | ICD-10-CM

## 2022-06-14 ASSESSMENT — PATIENT HEALTH QUESTIONNAIRE - PHQ9: SUM OF ALL RESPONSES TO PHQ QUESTIONS 1-9: 7

## 2022-06-14 NOTE — PROGRESS NOTES
Interventional Psychiatry Program  [address] [facility], [address]  [place] [address]  TMS Procedure Note   Adam Fish MRN# [id number]  Age: 32 year old year old YOB: 1990    Pre-Procedure:  History and Physical: Reviewed in medical record  Consent Signed by: Adam Fish for this course of treatment.  On: 5/11/2022    Clinical Narrative:  Patient is tolerating treatment. No changes reported.    Daily Adult Stimulation Safety Questionnaire: No or Yes in past 24 hours     1) Have you discontinued or started any new medication? No  2) Have you missed any doses of your medication differently then prescribed? No  3) Have you consumed alcohol or drugs (other than prescribed)?  No  4) Did you not sleep AT ALL last night?  No  5) Has your SI changed or worsened?  No    Indications for TMS:  MDD, recurrent, severe; 4+ medication trials (from 2+ classes) ineffective; Psychotherapy ineffective     Procedure Diagnosis:  MDD, recurrent, severe F33.2    Treatment Hx:  Treatment number this series: 22  Total lifetime treatment number: 22    Allergies   Allergen Reactions     No Known Drug Allergies       There were no vitals taken for this visit.    Pause for the Cause  Right patient: Yes  Right procedure/correct coil:  Yes; rTMS; [id number]; H1 coil.   Earplugs in place:  Yes    Procedure  Patient was seated in procedure chair. Identity and procedure was verified. Noise cancelling earbuds were placed in ears and patient-specific cap was placed on head and tightened appropriately. Ruler locations were verified. Bone conducting headphones were not used.  Coil was placed at 0 - eyebrow - 14 and stimulator was set to parameters listed below.  Initial train was well tolerated so 55 trains were delivered.  Patient tolerated procedure well with  right facial movement.    Motor Threshold Determination  Distance from nasion to inion: 37  MT 1: 0 - 5 - 14 @ 50% on 5/11/2022    Stimulation  Parameters:  Frequency: 18  Train Duration: 2  Total pulses delivered: 1980  Inter-train interval: 20  Tx Loc: 0 - eyebrow - 14 *USES BLUE SPACER*   Energy: 60% (120% MT)  Trains: 55       Date MADRS IDS-SR PHQ-9   5/11/2022 15 29 9   5/20/2022  25 9   6/3/2022  34 8   06/10/2022  33 8       PHQ-9 SCORE 6/10/2022 6/13/2022 6/14/2022   PHQ-9 Total Score - - -   PHQ-9 Total Score MyChart - - -   PHQ-9 Total Score 8 6 7     Michelle Kuhn, EMT  [facility]od[facility]    Plan   -continue TMS      I did not see the patient during/after treatment but remained available in the clinic during  treatment.    Emeli Rivera MD  [facility]od[facility]

## 2022-06-15 ENCOUNTER — OFFICE VISIT (OUTPATIENT)
Dept: PSYCHIATRY | Facility: CLINIC | Age: 32
End: 2022-06-15
Payer: COMMERCIAL

## 2022-06-15 DIAGNOSIS — F33.2 SEVERE EPISODE OF RECURRENT MAJOR DEPRESSIVE DISORDER, WITHOUT PSYCHOTIC FEATURES (H): Primary | ICD-10-CM

## 2022-06-15 ASSESSMENT — PATIENT HEALTH QUESTIONNAIRE - PHQ9: SUM OF ALL RESPONSES TO PHQ QUESTIONS 1-9: 9

## 2022-06-15 NOTE — PROGRESS NOTES
Interventional Psychiatry Program  [address], [address]  [place] [address]  TMS Procedure Note   Adam Fish MRN# [id number]  Age: 32 year old year old YOB: 1990    Pre-Procedure:  History and Physical: Reviewed in medical record  Consent Signed by: Adam Fish for this course of treatment.  On: 5/11/2022    Clinical Narrative:  Patient is tolerating treatment. Reports going to a [employer] show tonight.    Daily Adult Stimulation Safety Questionnaire: No or Yes in past 24 hours     1) Have you discontinued or started any new medication? No  2) Have you missed any doses of your medication differently then prescribed? No  3) Have you consumed alcohol or drugs (other than prescribed)?  No  4) Did you not sleep AT ALL last night?  No  5) Has your SI changed or worsened?  No    Indications for TMS:  MDD, recurrent, severe; 4+ medication trials (from 2+ classes) ineffective; Psychotherapy ineffective     Procedure Diagnosis:  MDD, recurrent, severe F33.2    Treatment Hx:  Treatment number this series: 23  Total lifetime treatment number: 23    Allergies   Allergen Reactions     No Known Drug Allergies       There were no vitals taken for this visit.    Pause for the Cause  Right patient: Yes  Right procedure/correct coil:  Yes; rTMS; [id number]; H1 coil.   Earplugs in place:  Yes    Procedure  Patient was seated in procedure chair. Identity and procedure was verified. Noise cancelling earbuds were placed in ears and patient-specific cap was placed on head and tightened appropriately. Ruler locations were verified. Bone conducting headphones were not used.  Coil was placed at 0 - eyebrow - 14 and stimulator was set to parameters listed below.  Initial train was well tolerated so 55 trains were delivered.  Patient tolerated procedure well with  right facial movement.    Motor Threshold Determination  Distance from nasion to inion: 37  MT 1: 0 - 5 - 14 @ 50% on  5/11/2022    Stimulation Parameters:  Frequency: 18  Train Duration: 2  Total pulses delivered: 1980  Inter-train interval: 20  Tx Loc: 0 - eyebrow - 14 *USES BLUE SPACER*   Energy: 60% (120% MT)  Trains: 55       Date MADRS IDS-SR PHQ-9   5/11/2022 15 29 9   5/20/2022  25 9   6/3/2022  34 8   06/10/2022  33 8       PHQ-9 SCORE 6/10/2022 6/13/2022 6/14/2022   PHQ-9 Total Score - - -   PHQ-9 Total Score MyChart - - -   PHQ-9 Total Score 8 6 7     FILIBERTO Alarcon  [facility]od[facility]    Plan   -continue TMS      I did not see the patient during/after treatment but remained available in the clinic during  treatment.    Brandyn Monahan MD    [facility]od[facility]

## 2022-06-16 ENCOUNTER — OFFICE VISIT (OUTPATIENT)
Dept: PSYCHIATRY | Facility: CLINIC | Age: 32
End: 2022-06-16
Payer: COMMERCIAL

## 2022-06-16 DIAGNOSIS — F33.2 SEVERE EPISODE OF RECURRENT MAJOR DEPRESSIVE DISORDER, WITHOUT PSYCHOTIC FEATURES (H): Primary | ICD-10-CM

## 2022-06-16 ASSESSMENT — PATIENT HEALTH QUESTIONNAIRE - PHQ9: SUM OF ALL RESPONSES TO PHQ QUESTIONS 1-9: 4

## 2022-06-16 NOTE — PROGRESS NOTES
Interventional Psychiatry Program  [address], [address]  [place] [address]  TMS Procedure Note   Adam Fish MRN# [id number]  Age: 32 year old year old YOB: 1990    Pre-Procedure:  History and Physical: Reviewed in medical record  Consent Signed by: Adam Fish for this course of treatment.  On: 5/11/2022    Clinical Narrative:  Patient is tolerating treatment. Reports playing basketball today.    Daily Adult Stimulation Safety Questionnaire: No or Yes in past 24 hours     1) Have you discontinued or started any new medication? No  2) Have you missed any doses of your medication differently then prescribed? No  3) Have you consumed alcohol or drugs (other than prescribed)?  No  4) Did you not sleep AT ALL last night?  No  5) Has your SI changed or worsened?  No    Indications for TMS:  MDD, recurrent, severe; 4+ medication trials (from 2+ classes) ineffective; Psychotherapy ineffective     Procedure Diagnosis:  MDD, recurrent, severe F33.2    Treatment Hx:  Treatment number this series: 24  Total lifetime treatment number: 24    Allergies   Allergen Reactions     No Known Drug Allergies       There were no vitals taken for this visit.    Pause for the Cause  Right patient: Yes  Right procedure/correct coil:  Yes; rTMS; [id number]; H1 coil.   Earplugs in place:  Yes    Procedure  Patient was seated in procedure chair. Identity and procedure was verified. Noise cancelling earbuds were placed in ears and patient-specific cap was placed on head and tightened appropriately. Ruler locations were verified. Bone conducting headphones were not used.  Coil was placed at 0 - eyebrow - 14 and stimulator was set to parameters listed below.  Initial train was well tolerated so 55 trains were delivered.  Patient tolerated procedure well with  right facial movement.    Motor Threshold Determination  Distance from nasion to inion: 37  MT 1: 0 - 5 - 14 @ 50% on 5/11/2022    Stimulation  Parameters:  Frequency: 18  Train Duration: 2  Total pulses delivered: 1980  Inter-train interval: 20  Tx Loc: 0 - eyebrow - 14 *USES BLUE SPACER*   Energy: 60% (120% MT)  Trains: 55       Date MADRS IDS-SR PHQ-9   5/11/2022 15 29 9   5/20/2022  25 9   6/3/2022  34 8   06/10/2022  33 8       PHQ-9 SCORE 6/13/2022 6/14/2022 6/15/2022   PHQ-9 Total Score - - -   PHQ-9 Total Score MyChart - - -   PHQ-9 Total Score 6 7 9     Michelle Kuhn, EMT  [facility]    Plan   -continue TMS      I did not see the patient during/after treatment but remained available in the clinic during  treatment.    I did not see the patient during/after treatment but remained available in the clinic during  treatment.    Emeli Rivera MD  [facility]

## 2022-06-17 ENCOUNTER — OFFICE VISIT (OUTPATIENT)
Dept: PSYCHIATRY | Facility: CLINIC | Age: 32
End: 2022-06-17
Payer: COMMERCIAL

## 2022-06-17 ENCOUNTER — LAB (OUTPATIENT)
Dept: LAB | Facility: CLINIC | Age: 32
End: 2022-06-17
Payer: COMMERCIAL

## 2022-06-17 DIAGNOSIS — Z77.018 EXPOSURE TO HEAVY METALS: ICD-10-CM

## 2022-06-17 DIAGNOSIS — F33.2 SEVERE EPISODE OF RECURRENT MAJOR DEPRESSIVE DISORDER, WITHOUT PSYCHOTIC FEATURES (H): Primary | ICD-10-CM

## 2022-06-17 LAB — FERRITIN SERPL-MCNC: 103 NG/ML (ref 26–388)

## 2022-06-17 PROCEDURE — 83655 ASSAY OF LEAD: CPT | Mod: 90

## 2022-06-17 PROCEDURE — 99000 SPECIMEN HANDLING OFFICE-LAB: CPT

## 2022-06-17 PROCEDURE — 82175 ASSAY OF ARSENIC: CPT | Mod: 90

## 2022-06-17 PROCEDURE — 83885 ASSAY OF NICKEL: CPT | Mod: 90

## 2022-06-17 PROCEDURE — 36415 COLL VENOUS BLD VENIPUNCTURE: CPT

## 2022-06-17 PROCEDURE — 82728 ASSAY OF FERRITIN: CPT

## 2022-06-17 PROCEDURE — 82300 ASSAY OF CADMIUM: CPT | Mod: 90

## 2022-06-17 ASSESSMENT — ANXIETY QUESTIONNAIRES
IF YOU CHECKED OFF ANY PROBLEMS ON THIS QUESTIONNAIRE, HOW DIFFICULT HAVE THESE PROBLEMS MADE IT FOR YOU TO DO YOUR WORK, TAKE CARE OF THINGS AT HOME, OR GET ALONG WITH OTHER PEOPLE: SOMEWHAT DIFFICULT

## 2022-06-17 ASSESSMENT — PATIENT HEALTH QUESTIONNAIRE - PHQ9: SUM OF ALL RESPONSES TO PHQ QUESTIONS 1-9: 5

## 2022-06-17 NOTE — PROGRESS NOTES
Interventional Psychiatry Program  [address], [address]  [place] [address]  TMS Procedure Note   Adam Fish MRN# [id number]  Age: 32 year old year old YOB: 1990    Pre-Procedure:  History and Physical: Reviewed in medical record  Consent Signed by: Adam Fish for this course of treatment.  On: 5/11/2022    Clinical Narrative:  Patient is tolerating treatment. Has plans to watch baseball with partner's father and go canoeing with a friend over the weekend.    Daily Adult Stimulation Safety Questionnaire: No or Yes in past 24 hours     1) Have you discontinued or started any new medication? No  2) Have you missed any doses of your medication differently then prescribed? No  3) Have you consumed alcohol or drugs (other than prescribed)?  No  4) Did you not sleep AT ALL last night?  No  5) Has your SI changed or worsened?  No    Indications for TMS:  MDD, recurrent, severe; 4+ medication trials (from 2+ classes) ineffective; Psychotherapy ineffective     Procedure Diagnosis:  MDD, recurrent, severe F33.2    Treatment Hx:  Treatment number this series: 25  Total lifetime treatment number: 25    Allergies   Allergen Reactions     No Known Drug Allergies       There were no vitals taken for this visit.    Pause for the Cause  Right patient: Yes  Right procedure/correct coil:  Yes; rTMS; [id number]; H1 coil.   Earplugs in place:  Yes    Procedure  Patient was seated in procedure chair. Identity and procedure was verified. Noise cancelling earbuds were placed in ears and patient-specific cap was placed on head and tightened appropriately. Ruler locations were verified. Bone conducting headphones were not used.  Coil was placed at 0 - eyebrow - 14 and stimulator was set to parameters listed below.  Initial train was well tolerated so 55 trains were delivered.  Patient tolerated procedure well with  right facial movement.    Motor Threshold Determination  Distance from nasion to  inion: 37  MT 1: 0 - 5 - 14 @ 50% on 5/11/2022    Stimulation Parameters:  Frequency: 18  Train Duration: 2  Total pulses delivered: 1980  Inter-train interval: 20  Tx Loc: 0 - eyebrow - 14 *USES BLUE SPACER*   Energy: 60% (120% MT)  Trains: 55       Date MADRS IDS-SR PHQ-9   5/11/2022 15 29 9   5/20/2022  25 9   6/3/2022  34 8   06/10/2022  33 8   06/17/2022  34 5       PHQ-9 SCORE 6/15/2022 6/16/2022 6/17/2022   PHQ-9 Total Score - - -   PHQ-9 Total Score MyChart - - -   PHQ-9 Total Score 9 4 5     Braxton Coy, TMS Technician  [facility]    Plan   -continue TMS      I did not see the patient during/after treatment but remained available in the clinic during  treatment.        Brandyn Monahan MD  [facility]

## 2022-06-19 LAB — NICKEL SERPL-MCNC: <2 UG/L

## 2022-06-20 ENCOUNTER — OFFICE VISIT (OUTPATIENT)
Dept: PSYCHIATRY | Facility: CLINIC | Age: 32
End: 2022-06-20

## 2022-06-20 DIAGNOSIS — F33.2 SEVERE EPISODE OF RECURRENT MAJOR DEPRESSIVE DISORDER, WITHOUT PSYCHOTIC FEATURES (H): Primary | ICD-10-CM

## 2022-06-20 LAB
ARSENIC BLD-MCNC: <10 UG/L
CADMIUM BLD-MCNC: <1 UG/L
LEAD BLDV-MCNC: <2 UG/DL

## 2022-06-20 ASSESSMENT — PATIENT HEALTH QUESTIONNAIRE - PHQ9: SUM OF ALL RESPONSES TO PHQ QUESTIONS 1-9: 6

## 2022-06-20 NOTE — PROGRESS NOTES
Interventional Psychiatry Program  [address], [address]  [place] [address]  TMS Procedure Note   Adam Fish MRN# [id number]  Age: 32 year old year old YOB: 1990    Pre-Procedure:  History and Physical: Reviewed in medical record  Consent Signed by: Adam Fish for this course of treatment.  On: 5/11/2022    Clinical Narrative:  Patient is tolerating treatment. Patient had a good weekend, girlfriend was sick, but spent time with her and saw friend/[place].    Daily Adult Stimulation Safety Questionnaire: No or Yes in past 24 hours     1) Have you discontinued or started any new medication? No  2) Have you missed any doses of your medication differently then prescribed? No  3) Have you consumed alcohol or drugs (other than prescribed)?  No  4) Did you not sleep AT ALL last night?  No  5) Has your SI changed or worsened?  No    Indications for TMS:  MDD, recurrent, severe; 4+ medication trials (from 2+ classes) ineffective; Psychotherapy ineffective     Procedure Diagnosis:  MDD, recurrent, severe F33.2    Treatment Hx:  Treatment number this series: 26  Total lifetime treatment number: 26    Allergies   Allergen Reactions     No Known Drug Allergies       There were no vitals taken for this visit.    Pause for the Cause  Right patient: Yes  Right procedure/correct coil:  Yes; rTMS; [id number]; H1 coil.   Earplugs in place:  Yes    Procedure  Patient was seated in procedure chair. Identity and procedure was verified. Noise cancelling earbuds were placed in ears and patient-specific cap was placed on head and tightened appropriately. Ruler locations were verified. Bone conducting headphones were not used.  Coil was placed at 0 - eyebrow - 14 and stimulator was set to parameters listed below.  Initial train was well tolerated so 55 trains were delivered.  Patient tolerated procedure well with  right facial movement.    Motor Threshold Determination  Distance from  nasion to inion: 37  MT 1: 0 - 5 - 14 @ 50% on 5/11/2022    Stimulation Parameters:  Frequency: 18  Train Duration: 2  Total pulses delivered: 1980  Inter-train interval: 20  Tx Loc: 0 - eyebrow - 14 *USES BLUE SPACER*   Energy: 60% (120% MT)  Trains: 55       Date MADRS IDS-SR PHQ-9   5/11/2022 15 29 9   5/20/2022  25 9   6/3/2022  34 8   06/10/2022  33 8   06/17/2022  34 5       PHQ-9 SCORE 6/16/2022 6/17/2022 6/20/2022   PHQ-9 Total Score - - -   PHQ-9 Total Score MyChart - - -   PHQ-9 Total Score 4 5 6     Braxton Coy, TMS Technician  [facility]    Plan   -continue TMS      I did not see patient but remained available in the clinic throughout the TMS session.        Bebo Chávez MD  [facility]

## 2022-06-21 ENCOUNTER — OFFICE VISIT (OUTPATIENT)
Dept: PSYCHIATRY | Facility: CLINIC | Age: 32
End: 2022-06-21

## 2022-06-21 DIAGNOSIS — F33.2 SEVERE EPISODE OF RECURRENT MAJOR DEPRESSIVE DISORDER, WITHOUT PSYCHOTIC FEATURES (H): Primary | ICD-10-CM

## 2022-06-21 ASSESSMENT — PATIENT HEALTH QUESTIONNAIRE - PHQ9: SUM OF ALL RESPONSES TO PHQ QUESTIONS 1-9: 12

## 2022-06-21 NOTE — PROGRESS NOTES
" Interventional Psychiatry Program  [address], [address]  [place] [address]  TMS Procedure Note   Adam Fish MRN# [id number]  Age: 32 year old year old YOB: 1990    Pre-Procedure:  History and Physical: Reviewed in medical record  Consent Signed by: Adam Fish for this course of treatment.  On: 5/11/2022    Clinical Narrative:  Patient is tolerating treatment. PHQ-9 score increased. Patient \"just having one of those days\", some difficulties sleeping.     Daily Adult Stimulation Safety Questionnaire: No or Yes in past 24 hours     1) Have you discontinued or started any new medication? No  2) Have you missed any doses of your medication differently then prescribed? No  3) Have you consumed alcohol or drugs (other than prescribed)?  No  4) Did you not sleep AT ALL last night?  No  5) Has your SI changed or worsened?  No    Indications for TMS:  MDD, recurrent, severe; 4+ medication trials (from 2+ classes) ineffective; Psychotherapy ineffective     Procedure Diagnosis:  MDD, recurrent, severe F33.2    Treatment Hx:  Treatment number this series: 27  Total lifetime treatment number: 27    Allergies   Allergen Reactions     No Known Drug Allergies       There were no vitals taken for this visit.    Pause for the Cause  Right patient: Yes  Right procedure/correct coil:  Yes; rTMS; [id number]; H1 coil.   Earplugs in place:  Yes    Procedure  Patient was seated in procedure chair. Identity and procedure was verified. Noise cancelling earbuds were placed in ears and patient-specific cap was placed on head and tightened appropriately. Ruler locations were verified. Bone conducting headphones were not used.  Coil was placed at 0 - eyebrow - 14 and stimulator was set to parameters listed below.  Initial train was well tolerated so 55 trains were delivered.  Patient tolerated procedure well with  right facial movement.    Motor Threshold Determination  Distance from nasion to inion: " 37  MT 1: 0 - 5 - 14 @ 50% on 5/11/2022    Stimulation Parameters:  Frequency: 18  Train Duration: 2  Total pulses delivered: 1980  Inter-train interval: 20  Tx Loc: 0 - eyebrow - 14 *USES BLUE SPACER*   Energy: 60% (120% MT)  Trains: 55       Date MADRS IDS-SR PHQ-9   5/11/2022 15 29 9   5/20/2022  25 9   6/3/2022  34 8   06/10/2022  33 8   06/17/2022  34 5       PHQ-9 SCORE 6/17/2022 6/20/2022 6/21/2022   PHQ-9 Total Score - - -   PHQ-9 Total Score MyChart - - -   PHQ-9 Total Score 5 6 12     Braxton Coy, TMS Technician  [facility]    Plan   -continue TMS      I did not see the patient during/after treatment but remained available in the clinic during  treatment.    Emeli Rivera MD  [facility]

## 2022-06-22 ENCOUNTER — OFFICE VISIT (OUTPATIENT)
Dept: PSYCHIATRY | Facility: CLINIC | Age: 32
End: 2022-06-22

## 2022-06-22 ENCOUNTER — ALLIED HEALTH/NURSE VISIT (OUTPATIENT)
Dept: PSYCHIATRY | Facility: CLINIC | Age: 32
End: 2022-06-22

## 2022-06-22 DIAGNOSIS — F33.2 SEVERE EPISODE OF RECURRENT MAJOR DEPRESSIVE DISORDER, WITHOUT PSYCHOTIC FEATURES (H): Primary | ICD-10-CM

## 2022-06-22 ASSESSMENT — PATIENT HEALTH QUESTIONNAIRE - PHQ9: SUM OF ALL RESPONSES TO PHQ QUESTIONS 1-9: 7

## 2022-06-22 NOTE — PROGRESS NOTES
Interventional Psychiatry Program  [address], [address]  [place] [address]  TMS Procedure Note   Adam Fish MRN# [id number]  Age: 32 year old year old YOB: 1990    Pre-Procedure:  History and Physical: Reviewed in medical record  Consent Signed by: Adam Fish for this course of treatment.  On: 5/11/2022    Clinical Narrative:  Patient is tolerating treatment. No patient changes reported.     Daily Adult Stimulation Safety Questionnaire: No or Yes in past 24 hours     1) Have you discontinued or started any new medication? No  2) Have you missed any doses of your medication differently then prescribed? No  3) Have you consumed alcohol or drugs (other than prescribed)?  No  4) Did you not sleep AT ALL last night?  No  5) Has your SI changed or worsened?  No    Indications for TMS:  MDD, recurrent, severe; 4+ medication trials (from 2+ classes) ineffective; Psychotherapy ineffective     Procedure Diagnosis:  MDD, recurrent, severe F33.2    Treatment Hx:  Treatment number this series: 28  Total lifetime treatment number: 28    Allergies   Allergen Reactions     No Known Drug Allergies       There were no vitals taken for this visit.    Pause for the Cause  Right patient: Yes  Right procedure/correct coil:  Yes; rTMS; [id number]; H1 coil.   Earplugs in place:  Yes    Procedure  Patient was seated in procedure chair. Identity and procedure was verified. Noise cancelling earbuds were placed in ears and patient-specific cap was placed on head and tightened appropriately. Ruler locations were verified. Bone conducting headphones were not used.  Coil was placed at 0 - eyebrow - 14 and stimulator was set to parameters listed below.  Initial train was well tolerated so 55 trains were delivered.  Patient tolerated procedure well with  right facial movement.    Motor Threshold Determination  Distance from nasion to inion: 37  MT 1: 0 - 5 - 14 @ 50% on 5/11/2022    Stimulation  Parameters:  Frequency: 18  Train Duration: 2  Total pulses delivered: 1980  Inter-train interval: 20  Tx Loc: 0 - eyebrow - 14 *USES BLUE SPACER*   Energy: 60% (120% MT)  Trains: 55       Date MADRS IDS-SR PHQ-9   5/11/2022 15 29 9   5/20/2022  25 9   6/3/2022  34 8   06/10/2022  33 8   06/17/2022  34 5       PHQ-9 SCORE 6/17/2022 6/20/2022 6/21/2022   PHQ-9 Total Score - - -   PHQ-9 Total Score MyChart - - -   PHQ-9 Total Score 5 6 12     Braxton Coy, TMS Technician  [facility]    Plan   -continue TMS    I did not see the patient but remained available in clinic throughout treatment.     Burt Calhoun MD  [facility]

## 2022-06-22 NOTE — PROGRESS NOTES
Physicians:  Care Coordination Note     SITUATION   Adam Fish is a 32 year old male who has been receiving Transcranial Magnetic Stimulation (TMS) at the request of Burt Calhoun.    BACKGROUND     During course of TMS    ASSESSMENT        Met with patient today to check on him during TMS course.    During today's discussion writer and patient discussed:    Mood: Adam states that initially at his three week check in he felt that TMS was doing something-but is thinking now that this is maybe a placebo.  He states that his mood is related to so many variables, such as work and relationships.  He states that when both of these things are going well he feels great, but when they are not his mood is not good.  States that he is still waking up and having no motivation to do anything; this is what he had hoped that TMS would be most helpful with.  We spent some time talking about how TMS works and how people respond.  He is hoping that once he is done with TMS he will notice a bigger change.       PHQ 9: 7    # of completed TMS Sessions: 28  Toleration of TMS: No issues tolerating treatment-states that he has gotten use to it.           PLAN       Nursing Interventions: education on TMS    Follow-up plan:  Finish up TMS     Kati Padron RN

## 2022-06-23 ENCOUNTER — TELEPHONE (OUTPATIENT)
Dept: FAMILY MEDICINE | Facility: CLINIC | Age: 32
End: 2022-06-23

## 2022-06-23 ENCOUNTER — NURSE TRIAGE (OUTPATIENT)
Dept: NURSING | Facility: CLINIC | Age: 32
End: 2022-06-23

## 2022-06-23 NOTE — TELEPHONE ENCOUNTER
"Patient calling requesting strep testing.    Reporting symptoms starting 1 week ago with sore throat, occasional cough and congestion.    Sore throat pain \"8-9\" on 1-10 pain scale when not on Ibuprofen.    Reporting Ibuprofen improves pain.    Taking fluids.    Reporting sore throat feels similar to previous strep throat.    Denies known exposure to strep throat. COVID 19 testing negative.    Disposition to see provider today in office.    Patient prefers Virtual Visit. Transferred to Central Scheduling.    Alea Sunshine RN  [facility]        COVID 19 Nurse Triage Plan/Patient Instructions    Please be aware that novel coronavirus (COVID-19) may be circulating in the community. If you develop symptoms such as fever, cough, or SOB or if you have concerns about the presence of another infection including coronavirus (COVID-19), please contact your health care provider or visit [email or web address].     Disposition/Instructions    Virtual Visit with provider recommended. Reference Visit Selection Guide.    Thank you for taking steps to prevent the spread of this virus.  o Limit your contact with others.  o Wear a simple mask to cover your cough.  o Wash your hands well and often.    Resources    [facility]: About COVID-19: [email or web address]    CDC: What to Do If You're Sick: www.cdc.gov/coronavirus/2019-ncov/about/steps-when-sick.html    CDC: Ending Home Isolation: www.cdc.gov/coronavirus/2019-ncov/hcp/disposition-in-home-patients.html     CDC: Caring for Someone: www.cdc.gov/coronavirus/2019-ncov/if-you-are-sick/care-for-someone.html     [facility]: Interim Guidance for Hospital Discharge to Home: www.health.[facility].[place]/diseases/coronavirus/hcp/hospdischarge.pdf    [facility] clinical trials (COVID-19 research studies): [email or web address]/umn-clinical-trials     Below are the COVID-19 hotlines at the [place] Department of Health ([facility]). Interpreters are available. "   o For health questions: Call 411-008-9873 or 1-964.457.9484 (7 a.m. to 7 p.m.)  o For questions about schools and childcare: Call 512-893-8717 or 1-800.747.4153 (7 a.m. to 7 p.m.)                             Reason for Disposition    SEVERE sore throat pain    Additional Information    Negative: Severe difficulty breathing (e.g., struggling for each breath, speaks in single words)    Negative: Sounds like a life-threatening emergency to the triager    Negative: Throat culture results, call about    Negative: Productive cough is the main symptom    Negative: Runny nose is the main symptom    Negative: Drooling or spitting out saliva (because can't swallow)    Negative: Unable to open mouth completely    Negative: Drinking very little and has signs of dehydration (e.g., no urine > 12 hours, very dry mouth, very lightheaded)    Negative: Patient sounds very sick or weak to the triager    Negative: Difficulty breathing (per caller) but not severe    Negative: Fever > 103 F (39.4 C)    Negative: Refuses to drink anything for > 12 hours    Protocols used: SORE THROAT-A-OH

## 2022-06-23 NOTE — TELEPHONE ENCOUNTER
Dr. Kirkpatrick requested author to contact patient regarding upcoming appointment   If patient has severe throat pain then needs to be evaluated in person     Call placed to patient   Relayed above    Patient rates pain an 8-9/10 on the pain scale  Pain does decrease with OTC Ibuprofen to a 2/10   Pain is constant   Describes pain as burning and sore     Reporting pain with swallowing  Denies white patches to throat - throat is slightly reddened   Denies difficulties with swallowing     VV cancelled with Dr. Kirkpatrick  Advised in clinic visit - if no appointments available needs to be evaluated in UC    Patient verbalized understanding  Transferred to central scheduling  No further questions/concerns    Nikos Saab RN

## 2022-06-27 ENCOUNTER — OFFICE VISIT (OUTPATIENT)
Dept: PSYCHIATRY | Facility: CLINIC | Age: 32
End: 2022-06-27

## 2022-06-27 DIAGNOSIS — F33.2 SEVERE EPISODE OF RECURRENT MAJOR DEPRESSIVE DISORDER, WITHOUT PSYCHOTIC FEATURES (H): Primary | ICD-10-CM

## 2022-06-27 ASSESSMENT — PATIENT HEALTH QUESTIONNAIRE - PHQ9: SUM OF ALL RESPONSES TO PHQ QUESTIONS 1-9: 7

## 2022-06-27 NOTE — PROGRESS NOTES
Interventional Psychiatry Program  [address], [address]  [place] [address]  TMS Procedure Note   Adam Fish MRN# [id number]  Age: 32 year old year old YOB: 1990    Pre-Procedure:  History and Physical: Reviewed in medical record  Consent Signed by: Adam Fish for this course of treatment.  On: 5/11/2022    Adam Fish comes into clinic today at the request of Burt Calhoun MD, ordering provider for TMS.    Clinical Narrative:  Patient is tolerating treatment. No patient changes reported, strep feeling better on antibiotics.     Daily Adult Stimulation Safety Questionnaire: No or Yes in past 24 hours     1) Have you discontinued or started any new medication? No  2) Have you missed any doses of your medication differently then prescribed? No  3) Have you consumed alcohol or drugs (other than prescribed)?  No  4) Did you not sleep AT ALL last night?  No  5) Has your SI changed or worsened?  No    Indications for TMS:  MDD, recurrent, severe; 4+ medication trials (from 2+ classes) ineffective; Psychotherapy ineffective     Procedure Diagnosis:  MDD, recurrent, severe F33.2    Treatment Hx:  Treatment number this series: 29  Total lifetime treatment number: 29    Allergies   Allergen Reactions     No Known Drug Allergies       There were no vitals taken for this visit.    Pause for the Cause  Right patient: Yes  Right procedure/correct coil:  Yes; rTMS; [id number]; H1 coil.   Earplugs in place:  Yes    Procedure  Patient was seated in procedure chair. Identity and procedure was verified. Noise cancelling earbuds were placed in ears and patient-specific cap was placed on head and tightened appropriately. Ruler locations were verified. Bone conducting headphones were not used.  Coil was placed at 0 - eyebrow - 14 and stimulator was set to parameters listed below.  Initial train was well tolerated so 55 trains were delivered.  Patient tolerated procedure well with   right facial movement.    Motor Threshold Determination  Distance from nasion to inion: 37  MT 1: 0 - 5 - 14 @ 50% on 5/11/2022    Stimulation Parameters:  Frequency: 18  Train Duration: 2  Total pulses delivered: 1980  Inter-train interval: 20  Tx Loc: 0 - eyebrow - 14 *USES BLUE SPACER*   Energy: 60% (120% MT)  Trains: 55       Date MADRS IDS-SR PHQ-9   5/11/2022 15 29 9   5/20/2022  25 9   6/3/2022  34 8   06/10/2022  33 8   06/17/2022  34 5       PHQ-9 SCORE 6/21/2022 6/22/2022 6/27/2022   PHQ-9 Total Score - - -   PHQ-9 Total Score MyChart - - -   PHQ-9 Total Score 12 7 7     Plan   -continue TMS    This service provided today was under the supervising provider of the day, Burt Calhoun MD, who was available if needed.    Braxton Coy, TMS Technician  [facility]

## 2022-06-28 ENCOUNTER — OFFICE VISIT (OUTPATIENT)
Dept: PSYCHIATRY | Facility: CLINIC | Age: 32
End: 2022-06-28

## 2022-06-28 DIAGNOSIS — F33.2 SEVERE EPISODE OF RECURRENT MAJOR DEPRESSIVE DISORDER, WITHOUT PSYCHOTIC FEATURES (H): Primary | ICD-10-CM

## 2022-06-28 ASSESSMENT — PATIENT HEALTH QUESTIONNAIRE - PHQ9: SUM OF ALL RESPONSES TO PHQ QUESTIONS 1-9: 8

## 2022-06-28 NOTE — PROGRESS NOTES
Interventional Psychiatry Program  [address] [facility], [address]  [place] [address]  TMS Procedure Note   Adam Fish MRN# [id number]  Age: 32 year old year old YOB: 1990    Pre-Procedure:  History and Physical: Reviewed in medical record  Consent Signed by: Adam Fish for this course of treatment.  On: 5/11/2022    Clinical Narrative:  Patient is tolerating treatment. No patient changes reported.    Daily Adult Stimulation Safety Questionnaire: No or Yes in past 24 hours     1) Have you discontinued or started any new medication? No  2) Have you missed any doses of your medication differently then prescribed? No  3) Have you consumed alcohol or drugs (other than prescribed)?  No  4) Did you not sleep AT ALL last night?  No  5) Has your SI changed or worsened?  No    Indications for TMS:  MDD, recurrent, severe; 4+ medication trials (from 2+ classes) ineffective; Psychotherapy ineffective     Procedure Diagnosis:  MDD, recurrent, severe F33.2    Treatment Hx:  Treatment number this series: 30  Total lifetime treatment number: 30    Allergies   Allergen Reactions     No Known Drug Allergies       There were no vitals taken for this visit.    Pause for the Cause  Right patient: Yes  Right procedure/correct coil:  Yes; rTMS; [id number]; H1 coil.   Earplugs in place:  Yes    Procedure  Patient was seated in procedure chair. Identity and procedure was verified. Noise cancelling earbuds were placed in ears and patient-specific cap was placed on head and tightened appropriately. Ruler locations were verified. Bone conducting headphones were not used.  Coil was placed at 0 - eyebrow - 14 and stimulator was set to parameters listed below.  Initial train was well tolerated so 55 trains were delivered.  Patient tolerated procedure well with  right facial movement.    Motor Threshold Determination  Distance from nasion to inion: 37  MT 1: 0 - 5 - 14 @ 50% on 5/11/2022    Stimulation  Parameters:  Frequency: 18  Train Duration: 2  Total pulses delivered: 1980  Inter-train interval: 20  Tx Loc: 0 - eyebrow - 14 *USES BLUE SPACER*   Energy: 60% (120% MT)  Trains: 55       Date MADRS IDS-SR PHQ-9   5/11/2022 15 29 9   5/20/2022  25 9   6/3/2022  34 8   06/10/2022  33 8   06/17/2022  34 5       PHQ-9 SCORE 6/21/2022 6/22/2022 6/27/2022   PHQ-9 Total Score - - -   PHQ-9 Total Score MyChart - - -   PHQ-9 Total Score 12 7 7     FILIBERTO Alarcon  [facility]    Plan   -continue TMS      I did not see the patient during/after treatment but remained available in the clinic during  treatment.    Emeli Rivera MD  [facility]

## 2022-06-29 ENCOUNTER — OFFICE VISIT (OUTPATIENT)
Dept: PSYCHIATRY | Facility: CLINIC | Age: 32
End: 2022-06-29

## 2022-06-29 DIAGNOSIS — F33.2 SEVERE EPISODE OF RECURRENT MAJOR DEPRESSIVE DISORDER, WITHOUT PSYCHOTIC FEATURES (H): Primary | ICD-10-CM

## 2022-06-29 ASSESSMENT — PATIENT HEALTH QUESTIONNAIRE - PHQ9: SUM OF ALL RESPONSES TO PHQ QUESTIONS 1-9: 8

## 2022-06-29 NOTE — PROGRESS NOTES
Interventional Psychiatry Program  [address] [facility] [facility], [address]  [place] [address]  TMS Procedure Note   Adam Fish MRN# [id number]  Age: 32 year old year old YOB: 1990    Pre-Procedure:  History and Physical: Reviewed in medical record  Consent Signed by: Adam Fish for this course of treatment.  On: 5/11/2022    Clinical Narrative:  Patient is tolerating treatment. No patient changes reported.    Daily Adult Stimulation Safety Questionnaire: No or Yes in past 24 hours     1) Have you discontinued or started any new medication? No  2) Have you missed any doses of your medication differently then prescribed? No  3) Have you consumed alcohol or drugs (other than prescribed)?  No  4) Did you not sleep AT ALL last night?  No  5) Has your SI changed or worsened?  No    Indications for TMS:  MDD, recurrent, severe; 4+ medication trials (from 2+ classes) ineffective; Psychotherapy ineffective     Procedure Diagnosis:  MDD, recurrent, severe F33.2    Treatment Hx:  Treatment number this series: 31  Total lifetime treatment number: 31    Allergies   Allergen Reactions     No Known Drug Allergies       There were no vitals taken for this visit.    Pause for the Cause  Right patient: Yes  Right procedure/correct coil:  Yes; rTMS; [id number]; H1 coil.   Earplugs in place:  Yes    Procedure  Patient was seated in procedure chair. Identity and procedure was verified. Noise cancelling earbuds were placed in ears and patient-specific cap was placed on head and tightened appropriately. Ruler locations were verified. Bone conducting headphones were not used.  Coil was placed at 0 - eyebrow - 14 and stimulator was set to parameters listed below.  Initial train was well tolerated so 55 trains were delivered.  Patient tolerated procedure well with  right facial movement.    Motor Threshold Determination  Distance from nasion to inion: 37  MT 1: 0 - 5 - 14 @ 50% on 5/11/2022    Stimulation  Parameters:  Frequency: 18  Train Duration: 2  Total pulses delivered: 1980  Inter-train interval: 20  Tx Loc: 0 - eyebrow - 14 *USES BLUE SPACER*   Energy: 60% (120% MT)  Trains: 55       Date MADRS IDS-SR PHQ-9   5/11/2022 15 29 9   5/20/2022  25 9   6/3/2022  34 8   06/10/2022  33 8   06/17/2022  34 5       PHQ-9 SCORE 6/22/2022 6/27/2022 6/28/2022   PHQ-9 Total Score - - -   PHQ-9 Total Score MyChart - - -   PHQ-9 Total Score 7 7 8     Braxton Coy   [facility]    Plan   -continue TMS      I did not see the patient but remained available in clinic throughout treatment.       Burt Calhoun MD  [facility]

## 2022-06-30 ENCOUNTER — OFFICE VISIT (OUTPATIENT)
Dept: PSYCHIATRY | Facility: CLINIC | Age: 32
End: 2022-06-30

## 2022-06-30 DIAGNOSIS — F33.2 SEVERE EPISODE OF RECURRENT MAJOR DEPRESSIVE DISORDER, WITHOUT PSYCHOTIC FEATURES (H): Primary | ICD-10-CM

## 2022-06-30 ASSESSMENT — PATIENT HEALTH QUESTIONNAIRE - PHQ9: SUM OF ALL RESPONSES TO PHQ QUESTIONS 1-9: 7

## 2022-06-30 NOTE — PROGRESS NOTES
Interventional Psychiatry Program  [address], [address]  [place] [address]  TMS Procedure Note   Adam Fish MRN# [id number]  Age: 32 year old year old YOB: 1990    Pre-Procedure:  History and Physical: Reviewed in medical record  Consent Signed by: Adam Fish for this course of treatment.  On: 5/11/2022    Clinical Narrative:  Patient is tolerating treatment. Patient reports not sleeping well last night but playing basketball today.    Daily Adult Stimulation Safety Questionnaire: No or Yes in past 24 hours     1) Have you discontinued or started any new medication? No  2) Have you missed any doses of your medication differently then prescribed? No  3) Have you consumed alcohol or drugs (other than prescribed)?  No  4) Did you not sleep AT ALL last night?  No  5) Has your SI changed or worsened?  No    Indications for TMS:  MDD, recurrent, severe; 4+ medication trials (from 2+ classes) ineffective; Psychotherapy ineffective     Procedure Diagnosis:  MDD, recurrent, severe F33.2    Treatment Hx:  Treatment number this series: 32  Total lifetime treatment number: 32    Allergies   Allergen Reactions     No Known Drug Allergies       There were no vitals taken for this visit.    Pause for the Cause  Right patient: Yes  Right procedure/correct coil:  Yes; rTMS; [id number]; H1 coil.   Earplugs in place:  Yes    Procedure  Patient was seated in procedure chair. Identity and procedure was verified. Noise cancelling earbuds were placed in ears and patient-specific cap was placed on head and tightened appropriately. Ruler locations were verified. Bone conducting headphones were not used.  Coil was placed at 0 - eyebrow - 14 and stimulator was set to parameters listed below.  Initial train was well tolerated so 55 trains were delivered.  Patient tolerated procedure well with  right facial movement.    Motor Threshold Determination  Distance from nasion to inion: 37  MT 1: 0 - 5 -  14 @ 50% on 5/11/2022    Stimulation Parameters:  Frequency: 18  Train Duration: 2  Total pulses delivered: 1980  Inter-train interval: 20  Tx Loc: 0 - eyebrow - 14 *USES BLUE SPACER*   Energy: 60% (120% MT)  Trains: 55       Date MADRS IDS-SR PHQ-9   5/11/2022 15 29 9   5/20/2022  25 9   6/3/2022  34 8   06/10/2022  33 8   06/17/2022  34 5       PHQ-9 SCORE 6/27/2022 6/28/2022 6/29/2022   PHQ-9 Total Score - - -   PHQ-9 Total Score MyChart - - -   PHQ-9 Total Score 7 8 8     FILIBERTO Alarcon  [facility]    Plan   -continue TMS      I did not see the patient during/after treatment but remained available in the clinic during  treatment.    Emeli Rivera MD  [facility]

## 2022-07-01 ENCOUNTER — OFFICE VISIT (OUTPATIENT)
Dept: PSYCHIATRY | Facility: CLINIC | Age: 32
End: 2022-07-01

## 2022-07-01 DIAGNOSIS — F33.2 SEVERE EPISODE OF RECURRENT MAJOR DEPRESSIVE DISORDER, WITHOUT PSYCHOTIC FEATURES (H): Primary | ICD-10-CM

## 2022-07-01 ASSESSMENT — PATIENT HEALTH QUESTIONNAIRE - PHQ9: SUM OF ALL RESPONSES TO PHQ QUESTIONS 1-9: 6

## 2022-07-01 NOTE — PROGRESS NOTES
Interventional Psychiatry Program  [address] [facility], [address]  [place] [address]  TMS Procedure Note   Adam Fish MRN# [id number]  Age: 32 year old year old YOB: 1990    Pre-Procedure:  History and Physical: Reviewed in medical record  Consent Signed by: Adam Fish for this course of treatment.  On: 5/11/2022    Clinical Narrative:  Patient is tolerating treatment. No patient changes reported, plans to attend a friend's birthday today and plans with girlfriend's family over the weekend.    Daily Adult Stimulation Safety Questionnaire: No or Yes in past 24 hours     1) Have you discontinued or started any new medication? No  2) Have you missed any doses of your medication differently then prescribed? No  3) Have you consumed alcohol or drugs (other than prescribed)?  No  4) Did you not sleep AT ALL last night?  No  5) Has your SI changed or worsened?  No    Indications for TMS:  MDD, recurrent, severe; 4+ medication trials (from 2+ classes) ineffective; Psychotherapy ineffective     Procedure Diagnosis:  MDD, recurrent, severe F33.2    Treatment Hx:  Treatment number this series: 33  Total lifetime treatment number: 33    Allergies   Allergen Reactions     No Known Drug Allergies       There were no vitals taken for this visit.    Pause for the Cause  Right patient: Yes  Right procedure/correct coil:  Yes; rTMS; [id number]; H1 coil.   Earplugs in place:  Yes    Procedure  Patient was seated in procedure chair. Identity and procedure was verified. Noise cancelling earbuds were placed in ears and patient-specific cap was placed on head and tightened appropriately. Ruler locations were verified. Bone conducting headphones were not used.  Coil was placed at 0 - eyebrow - 14 and stimulator was set to parameters listed below.  Initial train was well tolerated so 55 trains were delivered.  Patient tolerated procedure well with  right facial movement.    Motor Threshold  Determination  Distance from nasion to inion: 37  MT 1: 0 - 5 - 14 @ 50% on 5/11/2022    Stimulation Parameters:  Frequency: 18  Train Duration: 2  Total pulses delivered: 1980  Inter-train interval: 20  Tx Loc: 0 - eyebrow - 14 *USES BLUE SPACER*   Energy: 60% (120% MT)  Trains: 55       Date MADRS IDS-SR PHQ-9   5/11/2022 15 29 9   5/20/2022  25 9   6/3/2022  34 8   06/10/2022  33 8   06/17/2022  34 5   07/01/2022  31 6       PHQ-9 SCORE 6/29/2022 6/30/2022 7/1/2022   PHQ-9 Total Score - - -   PHQ-9 Total Score MyChart - - -   PHQ-9 Total Score 8 7 6     Braxton Coy, TMS Technician  [facility]    Plan   -continue TMS      I did not see the patient but remained available in the clinic throughout the TMS session.     Essence Swian M.D.   [facility]

## 2022-07-05 ENCOUNTER — OFFICE VISIT (OUTPATIENT)
Dept: PSYCHIATRY | Facility: CLINIC | Age: 32
End: 2022-07-05

## 2022-07-05 DIAGNOSIS — F33.2 SEVERE EPISODE OF RECURRENT MAJOR DEPRESSIVE DISORDER, WITHOUT PSYCHOTIC FEATURES (H): Primary | ICD-10-CM

## 2022-07-05 ASSESSMENT — PATIENT HEALTH QUESTIONNAIRE - PHQ9: SUM OF ALL RESPONSES TO PHQ QUESTIONS 1-9: 6

## 2022-07-05 NOTE — PROGRESS NOTES
Interventional Psychiatry Program  [address] [facility], [address]  [place] [address]  TMS Procedure Note   Adam Fish MRN# [id number]  Age: 32 year old year old YOB: 1990    Pre-Procedure:  History and Physical: Reviewed in medical record  Consent Signed by: Adam Fish for this course of treatment.  On: 5/11/2022    Clinical Narrative:  Patient is tolerating treatment. No patient changes reported.    Daily Adult Stimulation Safety Questionnaire: No or Yes in past 24 hours     1) Have you discontinued or started any new medication? No  2) Have you missed any doses of your medication differently then prescribed? No  3) Have you consumed alcohol or drugs (other than prescribed)?  No  4) Did you not sleep AT ALL last night?  No  5) Has your SI changed or worsened?  No    Indications for TMS:  MDD, recurrent, severe; 4+ medication trials (from 2+ classes) ineffective; Psychotherapy ineffective     Procedure Diagnosis:  MDD, recurrent, severe F33.2    Treatment Hx:  Treatment number this series: 34  Total lifetime treatment number: 34    Allergies   Allergen Reactions     No Known Drug Allergies       There were no vitals taken for this visit.    Pause for the Cause  Right patient: Yes  Right procedure/correct coil:  Yes; rTMS; [id number]; H1 coil.   Earplugs in place:  Yes    Procedure  Patient was seated in procedure chair. Identity and procedure was verified. Noise cancelling earbuds were placed in ears and patient-specific cap was placed on head and tightened appropriately. Ruler locations were verified. Bone conducting headphones were not used.  Coil was placed at 0 - eyebrow - 14 and stimulator was set to parameters listed below.  Initial train was well tolerated so 55 trains were delivered.  Patient tolerated procedure well with  right facial movement.    Motor Threshold Determination  Distance from nasion to inion: 37  MT 1: 0 - 5 - 14 @ 50% on 5/11/2022    Stimulation  Parameters:  Frequency: 18  Train Duration: 2  Total pulses delivered: 1980  Inter-train interval: 20  Tx Loc: 0 - eyebrow - 14 *USES BLUE SPACER*   Energy: 60% (120% MT)  Trains: 55       Date MADRS IDS-SR PHQ-9   5/11/2022 15 29 9   5/20/2022  25 9   6/3/2022  34 8   06/10/2022  33 8   06/17/2022  34 5   07/01/2022  31 6       PHQ-9 SCORE 6/29/2022 6/30/2022 7/1/2022   PHQ-9 Total Score - - -   PHQ-9 Total Score MyChart - - -   PHQ-9 Total Score 8 7 6     Braxton Coy, TMS Technician  [facility]    Plan   -continue TMS    I did not see the patient during/after treatment but remained available in the clinic during  treatment.    Emeli Rivera MD  [facility]

## 2022-07-06 ENCOUNTER — MYC MEDICAL ADVICE (OUTPATIENT)
Dept: PSYCHIATRY | Facility: CLINIC | Age: 32
End: 2022-07-06

## 2022-07-06 ENCOUNTER — OFFICE VISIT (OUTPATIENT)
Dept: PSYCHIATRY | Facility: CLINIC | Age: 32
End: 2022-07-06

## 2022-07-06 DIAGNOSIS — F33.2 SEVERE EPISODE OF RECURRENT MAJOR DEPRESSIVE DISORDER, WITHOUT PSYCHOTIC FEATURES (H): Primary | ICD-10-CM

## 2022-07-06 DIAGNOSIS — F41.1 GENERALIZED ANXIETY DISORDER: ICD-10-CM

## 2022-07-06 ASSESSMENT — PATIENT HEALTH QUESTIONNAIRE - PHQ9: SUM OF ALL RESPONSES TO PHQ QUESTIONS 1-9: 6

## 2022-07-06 NOTE — PROGRESS NOTES
Interventional Psychiatry Program  [address] [facility], [address]  [place] [address]  TMS Procedure Note   Adam Fish MRN# [id number]  Age: 32 year old year old YOB: 1990    Pre-Procedure:  History and Physical: Reviewed in medical record  Consent Signed by: Adam Fish for this course of treatment.  On: 5/11/2022    Clinical Narrative:  Patient is tolerating treatment. No patient changes reported.    Daily Adult Stimulation Safety Questionnaire: No or Yes in past 24 hours     1) Have you discontinued or started any new medication? No  2) Have you missed any doses of your medication differently then prescribed? No  3) Have you consumed alcohol or drugs (other than prescribed)?  No  4) Did you not sleep AT ALL last night?  No  5) Has your SI changed or worsened?  No    Indications for TMS:  MDD, recurrent, severe; 4+ medication trials (from 2+ classes) ineffective; Psychotherapy ineffective     Procedure Diagnosis:  MDD, recurrent, severe F33.2    Treatment Hx:  Treatment number this series: 35  Total lifetime treatment number: 35    Allergies   Allergen Reactions     No Known Drug Allergies       There were no vitals taken for this visit.    Pause for the Cause  Right patient: Yes  Right procedure/correct coil:  Yes; rTMS; [id number]; H1 coil.   Earplugs in place:  Yes    Procedure  Patient was seated in procedure chair. Identity and procedure was verified. Noise cancelling earbuds were placed in ears and patient-specific cap was placed on head and tightened appropriately. Ruler locations were verified. Bone conducting headphones were not used.  Coil was placed at 0 - eyebrow - 14 and stimulator was set to parameters listed below.  Initial train was well tolerated so 55 trains were delivered.  Patient tolerated procedure well with  right facial movement.    Motor Threshold Determination  Distance from nasion to inion: 37  MT 1: 0 - 5 - 14 @ 50% on 5/11/2022    Stimulation  Parameters:  Frequency: 18  Train Duration: 2  Total pulses delivered: 1980  Inter-train interval: 20  Tx Loc: 0 - eyebrow - 14 *USES BLUE SPACER*   Energy: 60% (120% MT)  Trains: 55       Date MADRS IDS-SR PHQ-9   5/11/2022 15 29 9   5/20/2022  25 9   6/3/2022  34 8   06/10/2022  33 8   06/17/2022  34 5   07/01/2022  31 6       PHQ-9 SCORE 7/1/2022 7/5/2022 7/6/2022   PHQ-9 Total Score - - -   PHQ-9 Total Score MyChart - - -   PHQ-9 Total Score 6 6 6     Braxton Coy, TMS Technician  [facility]    Plan    -continue TMS    I did not see the patient but remained available in the clinic throughout the TMS session.     Essence Swain M.D.   [facility]

## 2022-07-06 NOTE — TELEPHONE ENCOUNTER
Are you willing to fill this prescription for the patient. He last saw you in 2021. Has missed and no show a few appointments now. Please review.

## 2022-07-07 ENCOUNTER — OFFICE VISIT (OUTPATIENT)
Dept: PSYCHIATRY | Facility: CLINIC | Age: 32
End: 2022-07-07

## 2022-07-07 DIAGNOSIS — F33.2 SEVERE EPISODE OF RECURRENT MAJOR DEPRESSIVE DISORDER, WITHOUT PSYCHOTIC FEATURES (H): Primary | ICD-10-CM

## 2022-07-07 RX ORDER — LORAZEPAM 0.5 MG/1
0.5 TABLET ORAL DAILY PRN
Qty: 90 TABLET | Refills: 1 | Status: SHIPPED | OUTPATIENT
Start: 2022-07-07 | End: 2023-02-15

## 2022-07-07 ASSESSMENT — PATIENT HEALTH QUESTIONNAIRE - PHQ9: SUM OF ALL RESPONSES TO PHQ QUESTIONS 1-9: 6

## 2022-07-07 NOTE — TELEPHONE ENCOUNTER
Date of Last Office Visit: 11/30/2021  Date of Next Office Visit: none (scheduling, please connect with patient and schedule follow up appointment with provider)  No shows since last visit: none  Cancellations since last visit: none    Medication requested: Lorazepam 0.5 mg tablet Date last ordered: 11/30/2021 Qty: 90 Refills: 1     Review of [place] ?: yes  Prescriptions  Total: 3  Private Pay: 0  Showing 1-3 of 3 Items View   15 Items    1 of 1   Filled  Drug  QTY  Days  Prescriber     03/10/2022 Lorazepam 0.5 Mg Tablet   90.00 90 De Cav    11/30/2021 Lorazepam 0.5 Mg Tablet   90.00 90 De Cav    08/19/2021 Lorazepam 0.5 Mg Tablet   90.00 90 De Cav   Disclaimer      Lapse in medication adherence greater than 5 days?: no  If yes, call patient and gather details: no  Medication refill request verified as identical to current order?: yes  Result of Last DAM, VPA, Li+ Level, CBC, or Carbamazepine Level (at or since last visit): N/A    Last visit treatment plan: Prescriptions  Total: 3  Private Pay: 0  Showing 1-3 of 3 Items View   15 Items    1 of 1   Filled  Drug  QTY  Days  Prescriber     03/10/2022 Lorazepam 0.5 Mg Tablet   90.00 90 De Cav    11/30/2021 Lorazepam 0.5 Mg Tablet   90.00 90 De Cav    08/19/2021 Lorazepam 0.5 Mg Tablet   90.00 90 De Cav   Disclaimer      []Medication refilled per  Medication Refill in Ambulatory Care  policy.  [x]Medication unable to be refilled by RN due to criteria not met as indicated below:    []Eligibility - not seen in the last year   []Supervision - no future appointment   []Compliance - no shows, cancellations or lapse in therapy   []Verification - order discrepancy   [x]Controlled medication   [x]Medication not included in policy   []90-day supply request   []Other

## 2022-07-07 NOTE — TELEPHONE ENCOUNTER
OK to fill script.  Thanks!    Odette Azevedo MD  [facility]  [facility]

## 2022-07-07 NOTE — PROGRESS NOTES
Interventional Psychiatry Program  [address] [facility] [facility], [address]  [place] [address]  TMS Procedure Note   Adam Fish MRN# [id number]  Age: 32 year old year old YOB: 1990    Pre-Procedure:  History and Physical: Reviewed in medical record  Consent Signed by: Adam Fish for this course of treatment.  On: 5/11/2022    Clinical Narrative:  Patient is tolerating treatment. Patient tested positive for Covid, but not feeling bad currently. Last day of TMS.    Daily Adult Stimulation Safety Questionnaire: No or Yes in past 24 hours     1) Have you discontinued or started any new medication? No  2) Have you missed any doses of your medication differently then prescribed? No  3) Have you consumed alcohol or drugs (other than prescribed)?  No  4) Did you not sleep AT ALL last night?  No  5) Has your SI changed or worsened?  No    Indications for TMS:  MDD, recurrent, severe; 4+ medication trials (from 2+ classes) ineffective; Psychotherapy ineffective     Procedure Diagnosis:  MDD, recurrent, severe F33.2    Treatment Hx:  Treatment number this series: 36  Total lifetime treatment number: 36    Allergies   Allergen Reactions     No Known Drug Allergies       There were no vitals taken for this visit.    Pause for the Cause  Right patient: Yes  Right procedure/correct coil:  Yes; rTMS; [id number]; H1 coil.   Earplugs in place:  Yes    Procedure  Patient was seated in procedure chair. Identity and procedure was verified. Noise cancelling earbuds were placed in ears and patient-specific cap was placed on head and tightened appropriately. Ruler locations were verified. Bone conducting headphones were not used.  Coil was placed at 0 - eyebrow - 14 and stimulator was set to parameters listed below.  Initial train was well tolerated so 55 trains were delivered.  Patient tolerated procedure well with  right facial movement.    Motor Threshold Determination  Distance from nasion to inion: 37  MT 1:  0 - 5 - 14 @ 50% on 5/11/2022    Stimulation Parameters:  Frequency: 18  Train Duration: 2  Total pulses delivered: 1980  Inter-train interval: 20  Tx Loc: 0 - eyebrow - 14 *USES BLUE SPACER*   Energy: 60% (120% MT)  Trains: 55       Date MADRS IDS-SR PHQ-9   5/11/2022 15 29 9   5/20/2022  25 9   6/3/2022  34 8   06/10/2022  33 8   06/17/2022  34 5   07/01/2022  31 6   07/07/2022 8 28 6       PHQ-9 SCORE 7/1/2022 7/5/2022 7/6/2022   PHQ-9 Total Score - - -   PHQ-9 Total Score MyChart - - -   PHQ-9 Total Score 6 6 6     Braxton Coy, TMS Technician  [facility]    Plan    -follow up with provider    I did not see the patient during/after treatment but remained available in the clinic during  treatment.    Emeli Rivera MD  [facility]

## 2022-07-18 ENCOUNTER — VIRTUAL VISIT (OUTPATIENT)
Dept: PSYCHIATRY | Facility: CLINIC | Age: 32
End: 2022-07-18
Payer: COMMERCIAL

## 2022-07-18 DIAGNOSIS — F33.2 SEVERE EPISODE OF RECURRENT MAJOR DEPRESSIVE DISORDER, WITHOUT PSYCHOTIC FEATURES (H): Primary | ICD-10-CM

## 2022-07-18 NOTE — PROGRESS NOTES
Writer attempted to call patient to check in after finishing TMS.  Received voicemail, but inbox was full.  Patient is schedule to see Dr. Calhoun next week for TMS followup as well.

## 2022-07-27 ENCOUNTER — VIRTUAL VISIT (OUTPATIENT)
Dept: PSYCHIATRY | Facility: CLINIC | Age: 32
End: 2022-07-27
Payer: COMMERCIAL

## 2022-07-27 VITALS — BODY MASS INDEX: 28.44 KG/M2 | WEIGHT: 210 LBS | HEIGHT: 72 IN

## 2022-07-27 DIAGNOSIS — F33.41 RECURRENT MAJOR DEPRESSIVE DISORDER, IN PARTIAL REMISSION (H): Primary | ICD-10-CM

## 2022-07-27 ASSESSMENT — PATIENT HEALTH QUESTIONNAIRE - PHQ9: SUM OF ALL RESPONSES TO PHQ QUESTIONS 1-9: 7

## 2022-07-27 ASSESSMENT — PAIN SCALES - GENERAL: PAINLEVEL: NO PAIN (0)

## 2022-07-27 NOTE — PROGRESS NOTES
"Adam is a 32 year old who is being evaluated via a billable video visit.      How would you like to obtain your AVS? MyChart  If the video visit is dropped, the invitation should be resent by: Send to e-mail at: [email or web address]  Will anyone else be joining your video visit? No    Karyna Alcaraz    Video-Visit Details    Video Start Time: 3pm    Type of service:  Video Visit    Video End Time:326pm    Originating Location (pt. Location): Home    Distant Location (provider location):  [facility]     Platform used for Video Visit: [employer]  [address], [address]  [place] [address]  Progress Note       PCP- Lulu Cope  Specialty Providers- no  Therapist- Shanon Neff at [facility] and yes  Psychiatric Med Management Provider- Gabrielle Azevedo at [facility]  Other Mental Health Providers- no    Referred by:  Psychiatrist  Referred for evaluation of:  depression.      Chief Complaint                                                                                                       Low moods     History of Present Illness                                                                                      Pertinent Background and Present Symptoms:    \"Not bad\", \"I think it helps\". No longer wakes up and feels terrible. Not having to simulate zheng. Notices the effects mostly in fewer low moods, not having any really bad days.     Still taking kratom, wondering if he should stop.     More functional in house getting things done.     Still seeing therapist and psychiatrist. Seeing therapist once a month. Has appointment with Dr. Azevedo for early August.     Planning some trips to [facility]. Playing softball. Planning to go to [place] in Winter.     Psychiatric Review of Symptoms:     Deirdre: Negative  Psychosis: Negative   Eating disorder: Negative  Homicidal Ideation: Negative         SUBSTANCE USE HISTORY                                              "                    RECENT SUBSTANCE USE:     TOBACCO- none     CAFFEINE- some caffeine, notes that he drinks kratum tea and finds this very helpful with mood, motivation, and activation  ALCOHOL- Adam reports that he has reduced his alcohol intake over the last 1-2 months at his girlfriend's request. He reports that before that, he did drink more than he intended some times, did get hung over. Reducing frequency and quantity has not been a problem  CANNABIS- gummies, fairly regular use         OTHER ILLICIT DRUGS- occasional use of mushrooms, MDMA    Past Use-   TOBACCO- none       CAFFEINE- unknown   ALCOHOL- regular use, some binge drinking  CANNABIS- smoking            OTHER ILLICIT DRUGS- occasional  use of mushrooms, MDMA    CD Treatment Hx: No  Medical Consequences (eg HIV/Hepatitis)- No  Legal Consequences- No     PSYCHIATRIC HISTORY     Past diagnoses: Depression with anxiety, JELANI    Past medication trials: multiple    Hospitalizations: none    Commitment: No, Current Larson order: No    ECT trials: No    TMS trials:  No      Ketamine:  No    Suicide attempts: No    Self-injurious behavior: No    Violent behavior: No    Outpatient Programs & Services [Psychotherapy, DBT, Day Treatment, Eating Disorder Tx etc]:   Current:  Medication management and Outpatient individual psychotherapy    Past:  Medication management and Outpatient individual psychotherapy    SOCIAL and FAMILY HISTORY                        patient reported                                     Living situation: Adam lives with his girlfriend, in a Private Residence.   Guns, weapons, or other means to harm oneself in the home? No  Pets at home? 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 Adam reports  No  to cultural considerations to take into account when providing treatment.     Gender identity:  Adam identifies as male and uses he/him/his pronouns.    Strengths & Coping Strategies:  Adam is knowledgeable and insightful about his mental health. He has a strong support system and is socially connected. He reports utilizing coping skills he's learned in therapy    Legal Hx: No    Trauma/Abuse Hx: No     Hx: No    Family Mental Health Hx- depression and anxiety on materanl and paternal sides         Psychiatric Medication Trials         RATING OF ANTIDEPRESSANT DRUGS:    Antidepressant treatment history using antidepressant resistant rating where available.    1.  Selective serotonin reuptake inhibitors (SSRIs):    a.  Escitalopram/lexapro:  From 02/2015 and then 03/2017.  He was on and off it with a max dose of 20 mg per day.  He had sexual side effects, and he said it was ineffective.  It would give him a rating of 4.  b.  Fluoxetine/Prozac:  From 05/2019 to 2021.  Max dose 60 mg per day.  It did not change the increase to 60 mg per day, so he is back to 40. Response is questionable. It would give it a rating of 4.    The SSRIs are giving him some sexual side effects.    2.  Serotonin noradrenaline reuptake inhibitors (SNRIs): Negative.    3.  Serotonin modulators and stimulators:  Negative.    4. Noradrenaline and dopamine reuptake inhibitors (NDRIs):    a.  Bupropion:  From 01/2017 until 2021.  Presently on.  Max dose 450 mg per day.  Would give it a rating of 4.    5.  Tricyclic antidepressants (TCAs):  Negative.    6.  Tetracyclic antidepressants:  a.  Trazodone/Desyrel:  In 03/2018 until 2020.  Increased to 150 mg per day.  It made him too groggy when he used it for sleep.    7.  Monoamine oxidase inhibitors (MAOIs):  Negative.    8.   Augmentation therapy:    - Benzodiazepines:  a.  Lorazepam: From 09/2019 to present.  Max dose 0.5 mg per day.      9.  Miscellaneous:  a. Buspirone/BuSpar:  From 11/2019 to present.  Max dose 60 mg per day.  Is used for anxiety.  b. Gabapentin/Neurontin:  From 07/2019.  Max dose 900 mg per day. Was used for anxiety and nerve pain.  c. Hydroxyzine/Atarax: In 2019.  It made him tired.  d.  Propranolol:  From 04/2020 to present, 20 mg per day for anxiety and hypertension.  e.  Pramipexole/Mirapex: From 07/2021 to present. Up to 1.5 mg per day.  So far, no change.    10.  Miscellaneous sleep aids:    a.  Diphenhydramine/Benadryl:  Yes, and it helps.  b.  Melatonin was tried.  c.  Gabapentin was tried.  d.  Trazodone was tried.    11.  Ketamine history:  Negative.    12.  Other reported treatments for depression and related mood disorders:    a.  TMS:  Negative.  b.  ECT:  Negative.  c.  VNS:  Negative.  d.  Bright lights:  Negative.  e. CPAP:  Negative.             Medical / Surgical History     Patient Active Problem List   Diagnosis     CARDIOVASCULAR SCREENING; LDL GOAL LESS THAN 160     Depression with anxiety     Elevated BP without diagnosis of hypertension     Chronic right-sided low back pain with right-sided sciatica     Dysthymia     Kyphosis (acquired) (postural)     Poor posture     Thoracic segment dysfunction     Sacral pain     Somatic dysfunction of sacral region     Somatic dysfunction of lumbar region     Paresthesias     Generalized anxiety disorder     Alcohol abuse     Social anxiety disorder     PTSD (post-traumatic stress disorder)     Major depression, single episode       Past Surgical History:   Procedure Laterality Date     ARTHROSCOPY SHOULDER SUPERIOR LABRUM ANTERIOR TO POSTERIOR REPAIR           Medical Review of Systems                                                                                                    Metals Screen   Yes No Item    x Implanted or lodged metals in body     x Implanted surgical devices    x Metal containing facial or scalp tattoos    x Non removable piercings   Seizure Screen  Yes No Item    x Current Seizure Disorder?    x History of Seizure?     Does patient have a cochlear implant? ___n_______  Does patient have any shunts?_______n____  Does patient have a pacemaker?_____n_____  Does patient have a vagus nerve stimulator?____n______  Does patient have a deep brain stimulator?____n______  Any other implanted device?________________n     Allergy   No known drug allergies     Current Medications     Current Outpatient Medications   Medication Sig Dispense Refill     buPROPion (WELLBUTRIN XL) 300 MG 24 hr tablet Take 1 tablet (300 mg) by mouth every morning 90 tablet 1     busPIRone HCl (BUSPAR) 30 MG tablet Take 1 tablet (30 mg) by mouth 2 times daily 180 tablet 1     FLUoxetine (PROZAC) 40 MG capsule Take 1 capsule (40 mg) by mouth daily 90 capsule 1     LORazepam (ATIVAN) 0.5 MG tablet Take 1 tablet (0.5 mg) by mouth daily as needed for anxiety 90 tablet 1     propranolol (INDERAL) 20 MG tablet Take 1 tablet (20 mg) by mouth daily 90 tablet 1     pramipexole (MIRAPEX) 0.125 MG tablet Taper up to 1.5 mg daily, add 0.125 mg to 1 mg dose for three days, then add 0.250 mg to 1 mg dose, then add 0.375 mg to 1 mg dose. (Patient not taking: Reported on 2/9/2022) 18 tablet 0     pramipexole (MIRAPEX) 1 MG tablet Take 1.5 mg PO daily after tapering up with 0.125 mg tablets 45 tablet 5        Vitals                                                                                                                             Ht 1.829 m (6')   Wt 95.3 kg (210 lb)   BMI 28.48 kg/m        Mental Status Exam                                                                                        Alertness: alert  and oriented  Appearance: casually groomed  Behavior/Demeanor: cooperative, pleasant and calm, with good  eye contact   Speech: normal and regular rate and rhythm  Language:  intact  Psychomotor: normal or unremarkable  Mood: depressed  Affect: restricted; was congruent to mood; was congruent to content  Thought Process/Associations: unremarkable  Thought Content:  Reports none;  Denies suicidal and violent ideation  Perception:  Reports none;  Denies auditory hallucinations  Insight: good  Judgment: good  Cognition: (6) oriented: time, person, and place  attention span: intact  concentration: intact  recent memory: intact  remote memory: intact  fund of knowledge: appropriate  Gait and Station: unremarkable     Labs and Data       PHQ9 Today:  7  PHQ 7/6/2022 7/7/2022 7/27/2022   PHQ-9 Total Score 6 6 7   Q9: Thoughts of better off dead/self-harm past 2 weeks Not at all Not at all Not at all         Recent Labs   Lab Test 02/09/22  1110   CR 0.97   GFRESTIMATED >90     Recent Labs   Lab Test 02/09/22  1110   AST 28   ALT 52   ALKPHOS 70           Diagnosis and Assessment                                                                                  Impression: Good response to TMS, good candidate for re-treat if needed.     Background:  Adam Fish is a 32 year old male with a history of depression that dates back to a an unpleasant experience with psilocybin 12 years ago at age 19. His current episode is either his second or third, and is currently mild to moderate severity. He reports no prior experiences with depression and has experiened little relief since then, up until this past fall when he may have gone into something close to remission. It is most likely that this psychedlic experience unmasked underlying depressive diathesis, given the FH and subsequent course. Medication-wise, he has only tried only SSRIs with bupropion augmentation with slight benefit. His therapy experience appears mostly limited to supportive, which he says has been very helpful. He may benefit from a switch to an SNRI (venlafaxine duloxetine or vortioxetine are good choices).         Suicide Risk  Assessment:  Today Adam Fish reports no SI, intent or plan. In addition, he has notable risk factors for self-harm, including male. However, risk is mitigated by no h/o suicide attempt, no plan or intent, no h/o risky impulsive behavior, no access to lethal means, describes a safety plan, h/o seeking help when needed and none to minimal alcohol use . Therefore, based on all available evidence including the factors cited above, he does not appear to be at imminent risk for self-harm, does not meet criteria for a 72-hr hold, and therefore involuntary hospitalization will not be pursued at this  time.   Today the following issues were addressed:    1) Low mood  2) emptiness  3) listliness          Plan                                                                                                                          1) Major depressive disorder  -- Medications: Continue current outpatient psychotropic medications  Consider switch primary antidepressant to SNRI, consider lithium augmentation.   -- Psychotherapy: Continue regular individual psychotherapy   Retrial CBT  -- Procedures:    - rTMS retreat if needed   -sun lamp  -- Referrals: None      The patient understands to call 911 or go to the nearest ED if life threatening or urgent symptoms occur.            PROVIDER:  Burt Calhoun MD

## 2022-08-02 ENCOUNTER — VIRTUAL VISIT (OUTPATIENT)
Dept: PSYCHIATRY | Facility: CLINIC | Age: 32
End: 2022-08-02
Payer: COMMERCIAL

## 2022-08-02 VITALS — BODY MASS INDEX: 28.48 KG/M2 | WEIGHT: 210 LBS

## 2022-08-02 DIAGNOSIS — F41.1 GENERALIZED ANXIETY DISORDER: ICD-10-CM

## 2022-08-02 DIAGNOSIS — F41.8 DEPRESSION WITH ANXIETY: Primary | ICD-10-CM

## 2022-08-02 PROCEDURE — 99215 OFFICE O/P EST HI 40 MIN: CPT | Mod: 95 | Performed by: PSYCHIATRY & NEUROLOGY

## 2022-08-02 RX ORDER — PRAMIPEXOLE DIHYDROCHLORIDE 1 MG/1
TABLET ORAL
Qty: 45 TABLET | Refills: 5 | Status: CANCELLED | OUTPATIENT
Start: 2022-08-02

## 2022-08-02 RX ORDER — FLUOXETINE 40 MG/1
40 CAPSULE ORAL DAILY
Qty: 90 CAPSULE | Refills: 1 | Status: SHIPPED | OUTPATIENT
Start: 2022-08-02 | End: 2023-02-15

## 2022-08-02 RX ORDER — BUSPIRONE HYDROCHLORIDE 30 MG/1
30 TABLET ORAL 2 TIMES DAILY
Qty: 180 TABLET | Refills: 1 | Status: SHIPPED | OUTPATIENT
Start: 2022-08-02 | End: 2023-02-07

## 2022-08-02 RX ORDER — BUPROPION HYDROCHLORIDE 300 MG/1
300 TABLET ORAL EVERY MORNING
Qty: 90 TABLET | Refills: 1 | Status: SHIPPED | OUTPATIENT
Start: 2022-08-02 | End: 2023-02-15

## 2022-08-02 RX ORDER — PROPRANOLOL HYDROCHLORIDE 20 MG/1
20 TABLET ORAL DAILY
Qty: 90 TABLET | Refills: 1 | Status: SHIPPED | OUTPATIENT
Start: 2022-08-02 | End: 2023-02-15

## 2022-08-02 ASSESSMENT — ANXIETY QUESTIONNAIRES
GAD7 TOTAL SCORE: 6
7. FEELING AFRAID AS IF SOMETHING AWFUL MIGHT HAPPEN: NOT AT ALL
2. NOT BEING ABLE TO STOP OR CONTROL WORRYING: SEVERAL DAYS
IF YOU CHECKED OFF ANY PROBLEMS ON THIS QUESTIONNAIRE, HOW DIFFICULT HAVE THESE PROBLEMS MADE IT FOR YOU TO DO YOUR WORK, TAKE CARE OF THINGS AT HOME, OR GET ALONG WITH OTHER PEOPLE: SOMEWHAT DIFFICULT
GAD7 TOTAL SCORE: 6
7. FEELING AFRAID AS IF SOMETHING AWFUL MIGHT HAPPEN: NOT AT ALL
GAD7 TOTAL SCORE: 6
6. BECOMING EASILY ANNOYED OR IRRITABLE: SEVERAL DAYS
3. WORRYING TOO MUCH ABOUT DIFFERENT THINGS: SEVERAL DAYS
1. FEELING NERVOUS, ANXIOUS, OR ON EDGE: SEVERAL DAYS
5. BEING SO RESTLESS THAT IT IS HARD TO SIT STILL: SEVERAL DAYS
4. TROUBLE RELAXING: SEVERAL DAYS
8. IF YOU CHECKED OFF ANY PROBLEMS, HOW DIFFICULT HAVE THESE MADE IT FOR YOU TO DO YOUR WORK, TAKE CARE OF THINGS AT HOME, OR GET ALONG WITH OTHER PEOPLE?: SOMEWHAT DIFFICULT

## 2022-08-02 ASSESSMENT — PATIENT HEALTH QUESTIONNAIRE - PHQ9
SUM OF ALL RESPONSES TO PHQ QUESTIONS 1-9: 6
10. IF YOU CHECKED OFF ANY PROBLEMS, HOW DIFFICULT HAVE THESE PROBLEMS MADE IT FOR YOU TO DO YOUR WORK, TAKE CARE OF THINGS AT HOME, OR GET ALONG WITH OTHER PEOPLE: SOMEWHAT DIFFICULT
SUM OF ALL RESPONSES TO PHQ QUESTIONS 1-9: 6

## 2022-08-02 NOTE — PATIENT INSTRUCTIONS
Continue bupropion  mg daily.     Continue buspirone 30 mg twice daily.     Continue propranolol 20 mg at bedtime.     Continue fluoxetine 40 mg daily.    Continue individual therapy.    Exercise 30 min 3 times per week.     Continue all other medications per your primary care provider.    Schedule an appointment with me in 4 to 6 weeks or sooner as needed.  You may call [facility] at [phone] to schedule.    [place] Resources:    Go to the Emergency Department as needed or call after hours crisis line at [phone].    To schedule individual or family therapy, call [facility] at [phone].   Follow up with primary care provider as planned or sooner for acute medical concerns.  Call the psychiatric nurse line with medication questions or concerns at [phone].  [employer] may be used to communicate with your provider, but this is not intended to be used for emergencies.    Community Resources:    National Suicide Prevention Lifeline: 759.350.4503 (TTY: 167.533.4721). Call anytime for help.  (www.suicidepreventionlifeline.org)  National [place] on Mental Illness (www.rosalio.org): 910.575.2839 or 969-802-5075.   Mental Health Association (www.mentalhealth.org): 428.104.2157 or 609-319-0348.  [place] Crisis Text Line: Text [place] to 343762  Suicide LifeLine Chat: suicidepreSkok Innovationsline.org/chat

## 2022-08-02 NOTE — PROGRESS NOTES
"Telemedicine Visit: The patient's condition can be safely assessed and treated via synchronous audio and visual telemedicine encounter.      Reason for Telemedicine Visit: Services only offered telehealth    Originating Site (Patient Location): Patient's home    Distant Site (Provider Location): Provider Remote Setting- Home Office    Consent:  The patient/guardian has verbally consented to: the potential risks and benefits of telemedicine (video visit) versus in person care; bill my insurance or make self-payment for services provided; and responsibility for payment of non-covered services.     Mode of Communication:  Video Conference via [employer]    As the provider I attest to compliance with applicable laws and regulations related to telemedicine.    Adam is a 32 year old who is being evaluated via a billable video visit.      How would you like to obtain your AVS? MyChart  If the video visit is dropped, the invitation should be resent by: Text to cell phone: [phone]  Will anyone else be joining your video visit? No         Outpatient Psychiatric Progress Note    Name: Adam Fish   : 1990                    Primary Care Provider: Lulu Cope MD   Therapist:  Shanon Neff, [facility]         PHQ-9 scores:  PHQ-9 SCORE 2022   PHQ-9 Total Score - - -   PHQ-9 Total Score MyChart - - 6 (Mild depression)   PHQ-9 Total Score 6 7 6       JELANI-7 scores:  JELANI-7 SCORE 2021   Total Score - - -   Total Score - - 6 (mild anxiety)   Total Score 3 6 6       Patient Identification:  Adam is a 32 year old year old male  who presents for return visit with me.  Patient attended the session alone.     Interim History:  Adam was last seen in .  Since then, he has undergone a series of TMS treatments.  He reports that he no longer has any \"super down days,\" but is not really seeing the improvement he would like in his mood after " TMS.  He would like to wait another month to see what the full effect of the TMS will be rather than changing his medications right now.      We did review several medications that might be effective, including duloxetine, venlafaxine, does venlafaxine, aripiprazole, lithium, and vilazodone.  He reports that he is exercising at times, but not on a regular basis.  Exercise seem to be very helpful for his mood in the past, and he will work on exercising at least 3 times a week.  We discussed the possibility that he would benefit from light therapy, especially after the fall Equinox.    Adam continues to see his therapist, and finds it beneficial.  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  He is hesitant to do so, as the office has always been an uncomfortable place for him.  He and his supervisor agreed that he would start with 1 day a week and see how it goes.    Vital Signs:   Wt 95.3 kg (210 lb)   BMI 28.48 kg/m      Labs:  TSH   Date Value Ref Range Status   02/09/2022 1.42 0.40 - 4.00 mU/L Final     Last Comprehensive Metabolic Panel:  Sodium   Date Value Ref Range Status   02/09/2022 139 133 - 144 mmol/L Final     Potassium   Date Value Ref Range Status   02/09/2022 4.3 3.4 - 5.3 mmol/L Final     Chloride   Date Value Ref Range Status   02/09/2022 109 94 - 109 mmol/L Final     Carbon Dioxide (CO2)   Date Value Ref Range Status   02/09/2022 28 20 - 32 mmol/L Final     Anion Gap   Date Value Ref Range Status   02/09/2022 2 (L) 3 - 14 mmol/L Final     Glucose   Date Value Ref Range Status   02/09/2022 79 70 - 99 mg/dL Final     Urea Nitrogen   Date Value Ref Range Status   02/09/2022 12 7 - 30 mg/dL Final     Creatinine   Date Value Ref Range Status   02/09/2022 0.97 0.66 - 1.25 mg/dL Final     GFR Estimate   Date Value Ref Range Status   02/09/2022 >90 >60 mL/min/1.73m2 Final     Comment:     Effective December 21,  2021 eGFRcr in adults is calculated using the 2021 CKD-EPI creatinine equation which includes age and gender (Minerva et al., NE, DOI: 10.1056/KTOIsy8223915)     Calcium   Date Value Ref Range Status   02/09/2022 8.9 8.5 - 10.1 mg/dL Final     Bilirubin Total   Date Value Ref Range Status   02/09/2022 0.8 0.2 - 1.3 mg/dL Final     Alkaline Phosphatase   Date Value Ref Range Status   02/09/2022 70 40 - 150 U/L Final     ALT   Date Value Ref Range Status   02/09/2022 52 0 - 70 U/L Final     AST   Date Value Ref Range Status   02/09/2022 28 0 - 45 U/L Final       Current Medications:  Current Outpatient Medications   Medication     buPROPion (WELLBUTRIN XL) 300 MG 24 hr tablet     busPIRone HCl (BUSPAR) 30 MG tablet     FLUoxetine (PROZAC) 40 MG capsule     LORazepam (ATIVAN) 0.5 MG tablet     propranolol (INDERAL) 20 MG tablet     No current facility-administered medications for this visit.        The [place] Prescription Monitoring Program has been reviewed and there are no concerns about diversionary activity for controlled substances at this time.      Mental Status Examination:  Adam is a 32-year-old man in no acute distress.  Speech is clear and normal in rate and tone.  Eye contact is good over the video connection.  Affect is blunted.  Mood is somewhat dysphoric.  Thoughts are logical and spontaneous with no loose associations or flight of ideas.  Thought content shows no psychosis.  No suicidal thoughts.  He is alert and oriented x3.      Assessment and Plan:    ICD-10-CM    1. Depression with anxiety  F41.8 buPROPion (WELLBUTRIN XL) 300 MG 24 hr tablet   2. Generalized anxiety disorder  F41.1 busPIRone HCl (BUSPAR) 30 MG tablet     FLUoxetine (PROZAC) 40 MG capsule     propranolol (INDERAL) 20 MG tablet       Medical comorbidities include:   Patient Active Problem List   Diagnosis     CARDIOVASCULAR SCREENING; LDL GOAL LESS THAN 160     Depression with anxiety     Elevated BP without diagnosis of  hypertension     Chronic right-sided low back pain with right-sided sciatica     Dysthymia     Kyphosis (acquired) (postural)     Poor posture     Thoracic segment dysfunction     Sacral pain     Somatic dysfunction of sacral region     Somatic dysfunction of lumbar region     Paresthesias     Generalized anxiety disorder     Alcohol abuse     Social anxiety disorder     PTSD (post-traumatic stress disorder)     Major depression, single episode       Treatment Plan:  Patient Instructions   Continue bupropion  mg daily.     Continue buspirone 30 mg twice daily.     Continue propranolol 20 mg at bedtime.     Continue fluoxetine 40 mg daily.    Continue individual therapy.    Exercise 30 min 3 times per week.     Continue all other medications per your primary care provider.    Schedule an appointment with me in 4 to 6 weeks or sooner as needed.  You may call [facility] at [phone] to schedule.    [place] Resources:      Go to the Emergency Department as needed or call after hours crisis line at [phone].      To schedule individual or family therapy, call [facility] at [phone].     Follow up with primary care provider as planned or sooner for acute medical concerns.    Call the psychiatric nurse line with medication questions or concerns at [phone].    [employer] may be used to communicate with your provider, but this is not intended to be used for emergencies.    Community Resources:      National Suicide Prevention Lifeline: 209.107.8510 (TTY: 168.766.2977). Call anytime for help.  (www.suicidepreventionlifeline.org)    National [place] on Mental Illness (www.rosalio.org): 317.581.4196 or 461-093-2601.     Mental Health Association (www.mentalhealth.org): 812.983.7437 or 644-425-0875.    [place] Crisis Text Line: Text [place] to 711099    Suicide LifeLine Chat: suicidepreCoffeeTable.org/chat         Administrative Billing:   Video call duration: 48  minutes   Start: 9:37 AM   Stop: 10:24 AM  Total time spent, including chart review and documentation: 58 minutes    Patient Status:  This is a continuous care patient and medications will be prescribed by the psychiatric provider until further indicated.

## 2022-10-15 ENCOUNTER — HEALTH MAINTENANCE LETTER (OUTPATIENT)
Age: 32
End: 2022-10-15

## 2022-10-25 ENCOUNTER — NURSE TRIAGE (OUTPATIENT)
Dept: FAMILY MEDICINE | Facility: CLINIC | Age: 32
End: 2022-10-25

## 2022-10-25 NOTE — TELEPHONE ENCOUNTER
Believes it's acid reflux   Happens when eating   Pizza made it very bad (tomato sauce worsens)  Mayela POLLARD RN    Reason for Disposition    Patient wants to be seen    Additional Information    Negative: SEVERE difficulty breathing (e.g., struggling for each breath, speaks in single words)    Negative: Passed out (i.e., fainted, collapsed and was not responding)    Negative: Difficult to awaken or acting confused (e.g., disoriented, slurred speech)    Negative: Shock suspected (e.g., cold/pale/clammy skin, too weak to stand, low BP, rapid pulse)    Negative: Chest pain lasting longer than 5 minutes and ANY of the following:* Over 44 years old* Over 30 years old and at least one cardiac risk factor (e.g., diabetes mellitus, high blood pressure, high cholesterol, smoker, or strong family history of heart disease)* History of heart disease (i.e., angina, heart attack, heart failure, bypass surgery, takes nitroglycerin)* Pain is crushing, pressure-like, or heavy    Negative: Heart beating < 50 beats per minute OR > 140 beats per minute    Negative: Visible sweat on face or sweat dripping down face    Negative: Sounds like a life-threatening emergency to the triager    Negative: Followed an injury to chest    Negative: SEVERE chest pain    Negative: Pain also in shoulder(s) or arm(s) or jaw    Negative: Difficulty breathing    Negative: Cocaine use within last 3 days    Negative: Major surgery in the past month    Negative: Hip or leg fracture (broken bone) in past month (or had cast on leg or ankle in past month)    Negative: Illness requiring prolonged bedrest in past month (e.g., immobilization, long hospital stay)    Negative: Long-distance travel in past month (e.g., car, bus, train, plane; with trip lasting 6 or more hours)    Negative: History of prior 'blood clot' in leg or lungs (i.e., deep vein thrombosis, pulmonary embolism)    Negative: History of inherited increased risk of blood clots (e.g., Factor 5  "Leiden, Anti-thrombin 3, Protein C or Protein S deficiency, Prothrombin mutation)    Negative: Cancer treatment in the past two months (or has cancer now)    Negative: Heart beating irregularly or very rapidly    Negative: Chest pain lasting longer than 5 minutes and occurred in last 3 days (72 hours) (Exception: feels exactly the same as previously diagnosed heartburn and has accompanying sour taste in mouth)    Negative: Chest pain or 'angina' comes and goes and is happening more often (increasing in frequency) or getting worse (increasing in severity) (Exception: chest pains that last only a few seconds)    Negative: Dizziness or lightheadedness    Negative: Coughing up blood    Negative: Patient sounds very sick or weak to the triager    Negative: Patient says chest pain feels exactly the same as previously diagnosed 'heartburn' and describes burning in chest and accompanying sour taste in mouth    Answer Assessment - Initial Assessment Questions  1. LOCATION: \"Where does it hurt?\"        Central chest   2. RADIATION: \"Does the pain go anywhere else?\" (e.g., into neck, jaw, arms, back)      no  3. ONSET: \"When did the chest pain begin?\" (Minutes, hours or days)       Last Friday  4. PATTERN \"Does the pain come and go, or has it been constant since it started?\"  \"Does it get worse with exertion?\"       Only with eating  5. DURATION: \"How long does it last\" (e.g., seconds, minutes, hours)      When eating  6. SEVERITY: \"How bad is the pain?\"  (e.g., Scale 1-10; mild, moderate, or severe)     - MILD (1-3): doesn't interfere with normal activities      - MODERATE (4-7): interferes with normal activities or awakens from sleep     - SEVERE (8-10): excruciating pain, unable to do any normal activities        Mild   7. CARDIAC RISK FACTORS: \"Do you have any history of heart problems or risk factors for heart disease?\" (e.g., angina, prior heart attack; diabetes, high blood pressure, high cholesterol, smoker, or strong " "family history of heart disease)      See problem list  8. PULMONARY RISK FACTORS: \"Do you have any history of lung disease?\"  (e.g., blood clots in lung, asthma, emphysema, birth control pills)      See problem list  9. CAUSE: \"What do you think is causing the chest pain?\"      Acid reflux   10. OTHER SYMPTOMS: \"Do you have any other symptoms?\" (e.g., dizziness, nausea, vomiting, sweating, fever, difficulty breathing, cough)        None  11. PREGNANCY: \"Is there any chance you are pregnant?\" \"When was your last menstrual period?\"        NA    Protocols used: CHEST PAIN-A-OH      "

## 2022-11-05 ENCOUNTER — IMMUNIZATION (OUTPATIENT)
Dept: FAMILY MEDICINE | Facility: CLINIC | Age: 32
End: 2022-11-05
Payer: COMMERCIAL

## 2022-11-05 DIAGNOSIS — Z23 NEED FOR PROPHYLACTIC VACCINATION AND INOCULATION AGAINST INFLUENZA: Primary | ICD-10-CM

## 2022-11-05 PROCEDURE — 90686 IIV4 VACC NO PRSV 0.5 ML IM: CPT

## 2022-11-05 PROCEDURE — 90471 IMMUNIZATION ADMIN: CPT

## 2022-11-05 PROCEDURE — 99207 PR NO CHARGE NURSE ONLY: CPT

## 2022-11-21 ENCOUNTER — TELEPHONE (OUTPATIENT)
Dept: PSYCHIATRY | Facility: CLINIC | Age: 32
End: 2022-11-21

## 2023-01-18 NOTE — TELEPHONE ENCOUNTER
Refill request through right fax denied for propranolol 20 mg tablet. Patient given 6 months refill.      Outpatient Medication Detail     Disp Refills Start End NICOLÁS   propranolol (INDERAL) 20 MG tablet 90 tablet 1 8/2/2022  No   Sig - Route: Take 1 tablet (20 mg) by mouth daily - Oral   Sent to pharmacy as: Propranolol HCl 20 MG Oral Tablet (INDERAL)   Class: E-Prescribe   Order: [id number]   E-Prescribing Status: Receipt confirmed by pharmacy (8/2/2022 10:23 AM CDT)

## 2023-02-14 DIAGNOSIS — F41.1 GENERALIZED ANXIETY DISORDER: ICD-10-CM

## 2023-02-14 DIAGNOSIS — F41.8 DEPRESSION WITH ANXIETY: ICD-10-CM

## 2023-02-14 NOTE — TELEPHONE ENCOUNTER
Left message for patient to see if he needed these filled today or if he can wait for Dr. Azevedo to get back. Waiting for call back.    Date of Last Office Visit: 8/2/22  Date of Next Office Visit: 3/15/23  No shows since last visit: 0  Cancellations since last visit: Today 2/14/23 provider initiated    Medication requested: buPROPion (WELLBUTRIN XL) 300 MG 24 hr tablet Date last ordered: 8/2/22 Qty: 90 Refills: 1    Medication requested:FLUoxetine (PROZAC) 40 MG capsule  Date last ordered: 8/2/22 Qty: 90 Refills: 1    Medication requested:propranolol (INDERAL) 20 MG tablet  Date last ordered: 8/2/22 Qty: 90 Refills: 1    Medication requested LORazepam (ATIVAN) 0.5 MG tablet:Date last ordered: 7/7/22 Qty: 90 Refills: 1     [place] of [place] ?: No access not granted    Lapse in medication adherence greater than 5 days?: No  If yes, call patient and gather details: na  Medication refill request verified as identical to current order?: yes  Result of Last DAM, VPA, Li+ Level, CBC, or Carbamazepine Level (at or since last visit): N/A    Last visit treatment plan:   Continue bupropion  mg daily.   Continue buspirone 30 mg twice daily.   Continue propranolol 20 mg at bedtime.   Continue fluoxetine 40 mg daily.   Continue individual therapy.   Exercise 30 min 3 times per week.   Schedule an appointment with me in 4 to 6 weeks or sooner as needed.     []Medication refilled per  Medication Refill in Ambulatory Care  policy.  [x]Medication unable to be refilled by RN due to criteria not met as indicated below:    []Eligibility - not seen in the last year   []Supervision - no future appointment   []Compliance - no shows, cancellations or lapse in therapy   []Verification - order discrepancy   [x]Controlled medication   [x]Medication not included in policy   [x]90-day supply request   []Other

## 2023-02-14 NOTE — TELEPHONE ENCOUNTER
Reason for Call:  Medication refill:    Name of the medication requested: Propranolol, Burpropion, Fluoxetine, Lorazepam    Other request: none    Can we leave a detailed message on this number? Yes    Phone number patient can be reached at: [phone]    Best Time: Anytime     Call taken on 2/14/2023 at 8:41 AM by Andrea Aranda

## 2023-02-15 RX ORDER — BUPROPION HYDROCHLORIDE 300 MG/1
300 TABLET ORAL EVERY MORNING
Qty: 90 TABLET | Refills: 1 | Status: SHIPPED | OUTPATIENT
Start: 2023-02-15 | End: 2023-04-05

## 2023-02-15 RX ORDER — PROPRANOLOL HYDROCHLORIDE 20 MG/1
20 TABLET ORAL DAILY
Qty: 90 TABLET | Refills: 1 | Status: SHIPPED | OUTPATIENT
Start: 2023-02-15 | End: 2023-05-31

## 2023-02-15 RX ORDER — LORAZEPAM 0.5 MG/1
0.5 TABLET ORAL DAILY PRN
Qty: 90 TABLET | Refills: 1 | Status: SHIPPED | OUTPATIENT
Start: 2023-02-15 | End: 2023-05-31

## 2023-02-15 RX ORDER — FLUOXETINE 40 MG/1
40 CAPSULE ORAL DAILY
Qty: 90 CAPSULE | Refills: 1 | Status: SHIPPED | OUTPATIENT
Start: 2023-02-15 | End: 2023-04-05

## 2023-03-15 ENCOUNTER — TELEPHONE (OUTPATIENT)
Dept: PSYCHIATRY | Facility: CLINIC | Age: 33
End: 2023-03-15
Payer: COMMERCIAL

## 2023-03-26 ENCOUNTER — HEALTH MAINTENANCE LETTER (OUTPATIENT)
Age: 33
End: 2023-03-26

## 2023-04-05 ENCOUNTER — VIRTUAL VISIT (OUTPATIENT)
Dept: PSYCHIATRY | Facility: CLINIC | Age: 33
End: 2023-04-05
Payer: COMMERCIAL

## 2023-04-05 DIAGNOSIS — F34.1 DYSTHYMIA: ICD-10-CM

## 2023-04-05 DIAGNOSIS — F41.1 GENERALIZED ANXIETY DISORDER: ICD-10-CM

## 2023-04-05 DIAGNOSIS — F33.1 MODERATE EPISODE OF RECURRENT MAJOR DEPRESSIVE DISORDER (H): Primary | ICD-10-CM

## 2023-04-05 PROCEDURE — 99214 OFFICE O/P EST MOD 30 MIN: CPT | Mod: 95 | Performed by: PSYCHIATRY & NEUROLOGY

## 2023-04-05 RX ORDER — VENLAFAXINE HYDROCHLORIDE 75 MG/1
75 CAPSULE, EXTENDED RELEASE ORAL DAILY
Qty: 30 CAPSULE | Refills: 1 | Status: SHIPPED | OUTPATIENT
Start: 2023-04-05 | End: 2023-05-09

## 2023-04-05 RX ORDER — VENLAFAXINE HYDROCHLORIDE 37.5 MG/1
37.5 CAPSULE, EXTENDED RELEASE ORAL DAILY
Qty: 7 CAPSULE | Refills: 0 | Status: SHIPPED | OUTPATIENT
Start: 2023-04-05 | End: 2023-04-24

## 2023-04-05 ASSESSMENT — ANXIETY QUESTIONNAIRES
1. FEELING NERVOUS, ANXIOUS, OR ON EDGE: SEVERAL DAYS
5. BEING SO RESTLESS THAT IT IS HARD TO SIT STILL: SEVERAL DAYS
8. IF YOU CHECKED OFF ANY PROBLEMS, HOW DIFFICULT HAVE THESE MADE IT FOR YOU TO DO YOUR WORK, TAKE CARE OF THINGS AT HOME, OR GET ALONG WITH OTHER PEOPLE?: SOMEWHAT DIFFICULT
GAD7 TOTAL SCORE: 8
4. TROUBLE RELAXING: SEVERAL DAYS
IF YOU CHECKED OFF ANY PROBLEMS ON THIS QUESTIONNAIRE, HOW DIFFICULT HAVE THESE PROBLEMS MADE IT FOR YOU TO DO YOUR WORK, TAKE CARE OF THINGS AT HOME, OR GET ALONG WITH OTHER PEOPLE: SOMEWHAT DIFFICULT
3. WORRYING TOO MUCH ABOUT DIFFERENT THINGS: SEVERAL DAYS
7. FEELING AFRAID AS IF SOMETHING AWFUL MIGHT HAPPEN: NOT AT ALL
GAD7 TOTAL SCORE: 8
6. BECOMING EASILY ANNOYED OR IRRITABLE: NEARLY EVERY DAY
7. FEELING AFRAID AS IF SOMETHING AWFUL MIGHT HAPPEN: NOT AT ALL
2. NOT BEING ABLE TO STOP OR CONTROL WORRYING: SEVERAL DAYS
GAD7 TOTAL SCORE: 8

## 2023-04-05 ASSESSMENT — PATIENT HEALTH QUESTIONNAIRE - PHQ9
10. IF YOU CHECKED OFF ANY PROBLEMS, HOW DIFFICULT HAVE THESE PROBLEMS MADE IT FOR YOU TO DO YOUR WORK, TAKE CARE OF THINGS AT HOME, OR GET ALONG WITH OTHER PEOPLE: VERY DIFFICULT
SUM OF ALL RESPONSES TO PHQ QUESTIONS 1-9: 6
SUM OF ALL RESPONSES TO PHQ QUESTIONS 1-9: 6

## 2023-04-05 NOTE — PROGRESS NOTES
Telemedicine Visit: The patient's condition can be safely assessed and treated via synchronous audio and visual telemedicine encounter.      Reason for Telemedicine Visit: Services only offered telehealth    Originating Site (Patient Location): Patient's home    Distant Site (Provider Location): Provider Remote Setting- Home Office    Consent:  The patient/guardian has verbally consented to: the potential risks and benefits of telemedicine (video visit) versus in person care; bill my insurance or make self-payment for services provided; and responsibility for payment of non-covered services.     Mode of Communication:  Video Conference via [employer]    As the provider I attest to compliance with applicable laws and regulations related to telemedicine.      How would you like to obtain your AVS? MyChart  If the video visit is dropped, the invitation should be resent by: Text to cell phone: [phone] or Email: [email or web address]  Will anyone else be joining your video visit? No           Outpatient Psychiatric Progress Note    Name: Adam Fish   : 1990                    Primary Care Provider: Lulu Cope MD   Therapist: Shanon Neff, [facility]          PHQ-9 scores:      2022     6:39 AM 3/15/2023     9:54 AM 2023    11:22 AM   PHQ-9 SCORE   PHQ-9 Total Score MyChart 6 (Mild depression) 10 (Moderate depression) 6 (Mild depression)   PHQ-9 Total Score 6 10 6       JELANI-7 scores:      2022     6:40 AM 3/15/2023     9:55 AM 2023    11:23 AM   JELANI-7 SCORE   Total Score 6 (mild anxiety) 6 (mild anxiety) 8 (mild anxiety)   Total Score 6 6 8    Answers for HPI/ROS submitted by the patient on 2023  If you checked off any problems, how difficult have these problems made it for you to do your work, take care of things at home, or get along with other people?: Very difficult  PHQ9 TOTAL SCORE: 6  JELANI 7 TOTAL SCORE: 8      Patient Identification:  Adam is a 33 year old  year old male  who presents for return visit with me.  Patient attended the session alone.     Interim History:  Adam has not been seen since August of last year.  He reports that he had a difficult winter.  He felt pretty depressed, but stopped his fluoxetine and bupropion about 3 months ago due to negative side effects.  He has not noticed a significant increase in his depression since stopping them.  He reports having had sexual side effects, and was still depressed.  He continues to take buspirone, propranolol, and lorazepam.  He also continues to use kratom about once a day and small amounts.    He previously did TMS through the treatment resistant depression clinic.  Initially he felt as though his mood improved, but now thinks that was more placebo effect, as the improvement did not last.  He is wondering about other treatments such as ketamine or ECT.  He will be referred back to the treatment resistant depression clinic.    In the meantime, we agreed to a trial of an SNRI.  He has tried escitalopram, fluoxetine, and bupropion in the past.  After discussion of risks and benefits, we agreed to a trial of venlafaxine.    Adam was informed that I will be leaving [place] on June 1.  He feels that he would benefit from having ongoing psychiatric care, so was referred for long-term care.    Vital Signs:   There were no vitals taken for this visit.      Current Medications:  Current Outpatient Medications   Medication     busPIRone HCl (BUSPAR) 30 MG tablet     LORazepam (ATIVAN) 0.5 MG tablet     propranolol (INDERAL) 20 MG tablet     venlafaxine (EFFEXOR XR) 37.5 MG 24 hr capsule     venlafaxine (EFFEXOR XR) 75 MG 24 hr capsule     No current facility-administered medications for this visit.        The [place] Prescription Monitoring Program has been reviewed and there are no concerns about diversionary activity for controlled substances at this time.      Mental Status Examination:  Adam is a  33-year-old man in no acute distress.  Speech is clear and normal in rate and tone.  Mood is depressed and somewhat anxious.  Thoughts are logical and spontaneous with no loose associations or flight of ideas.  Thought content shows no psychosis.  No suicidal thoughts.  He is alert and oriented x3.    Assessment and Plan:    ICD-10-CM    1. Moderate episode of recurrent major depressive disorder (H)  F33.1 venlafaxine (EFFEXOR XR) 37.5 MG 24 hr capsule     venlafaxine (EFFEXOR XR) 75 MG 24 hr capsule     Adult Mental Health  Referral     Adult Mental Health  Referral      2. Generalized anxiety disorder  F41.1       3. Dysthymia  F34.1           Medical comorbidities include:   Patient Active Problem List   Diagnosis     CARDIOVASCULAR SCREENING; LDL GOAL LESS THAN 160     Depression with anxiety     Elevated BP without diagnosis of hypertension     Chronic right-sided low back pain with right-sided sciatica     Dysthymia     Kyphosis (acquired) (postural)     Poor posture     Thoracic segment dysfunction     Sacral pain     Somatic dysfunction of sacral region     Somatic dysfunction of lumbar region     Paresthesias     Generalized anxiety disorder     Alcohol abuse     Social anxiety disorder     PTSD (post-traumatic stress disorder)     Major depression, single episode     Moderate episode of recurrent major depressive disorder (H)       Treatment Plan:  Patient Instructions   Continue buspirone 30 mg twice daily.     Continue propranolol 20 mg at bedtime.     Continue lorazepam 0.5 mg daily as needed for anxiety.    Start venlafaxine ER 37.5 mg daily for one week, then increase to 75 mg daily.     Continue individual therapy.     Exercise 30 min 3 times per week.     Continue all other medications per your primary care provider.    Schedule an appointment with me in 4 weeks or sooner as needed.  You may call [facility] at [phone] to schedule.    [place]  Resources:      Go to the Emergency Department as needed or call after hours crisis line at [phone].      To schedule individual or family therapy, call [facility] at [phone].     Follow up with primary care provider as planned or sooner for acute medical concerns.    Call the psychiatric nurse line with medication questions or concerns at [phone].    MyChart may be used to communicate with your provider, but this is not intended to be used for emergencies.    Community Resources:      National Suicide Prevention Lifeline: 769.280.6623 (TTY: 948.977.2537). Call anytime for help.  (www.suicidepreventionlifeline.org)    National [place] on Mental Illness (www.rosalio.org): 924.615.2479 or 736-260-1117.     Mental Health Association (www.mentalhealth.org): 940.947.9558 or 834-600-9803.    [place] Crisis Text Line: Text [place] to 165344    Suicide LifeLine Chat: suicideSciencescapeline.org/chat  Patient Education   Collaborative Care Psychiatry Service  What to Expect  Here's what to expect from your Collaborative Care Psychiatry Service (CCPS).   About CCPS  CCPS means 2 people work together to help you get better. You'll meet with a behavioral health clinician and a psychiatric doctor. A behavioral health clinician helps people with mental health problems by talking with them. A psychiatric doctor helps people by giving them medicine.  How it works  At every visit, you'll see the behavioral health clinician (BHC) first. They'll talk with you about how you're doing and teach you how to feel better.   Then you'll see the psychiatric doctor. This doctor can help you deal with troubling thoughts and feelings by giving you medicine. They'll make sure you know the plan for your care.   CCPS usually takes 3 to 6 visits. If you need more visits, we may have you start seeing a different psychiatric doctor for ongoing care.  If you have any questions or concerns, we'll be glad to talk with  "you.  About visits  Be open  At your visits, please talk openly about your problems. It may feel hard, but it's the best way for us to help you.  Cancelling visits  If you can't come to your visit, please call us right away at [phone]. If you don't cancel at least 24 hours (1 full day) before your visit, that's \"late cancellation.\"  Being late to visits  Being very late is the same as not showing up. You will be a \"no show\" if:    Your appointment starts with a [facility], and you're more than 15 minutes late for a 30-minute (half hour) visit. This will also cancel your appointment with the psychiatric doctor.    Your appointment is with a psychiatric doctor only, and you're more than 15 minutes late for a 30-minute (half hour) visit.    Your appointment is with a psychiatric doctor only, and you're more than 30 minutes late for a 60-minute (full hour) visit.  If you cancel late or don't show up 2 times within 6 months, we may end your care.   Getting help between visits  If you need help between visits, you can call us Monday to Friday from 8 a.m. to 4:30 p.m. at [phone].  Emergency care  Call 911 or go to the nearest emergency department if your life or someone else's life is in danger.  Call 988 anytime to reach the national Suicide and Crisis hotline.  Medicine refills  To refill your medicine, call your pharmacy. You can also call [facility]'s Behavioral Access at [phone], Monday to Friday, 8 a.m. to 4:30 p.m. It can take 1 to 3 business days to get a refill.   Forms, letters, and tests  You may have papers to fill out, like FMLA, short-term disability, and workability. We can help you with these forms at your visits, but you must have an appointment. You may need more than 1 visit for this, to be in an intensive therapy program, or both.  Before we can give you medicine for ADHD, we may refer you to get tested for it or confirm it another way.  We may not be able to give you an emotional " support animal letter.  We don't do mental health checks ordered by the court.   We don't do mental health testing, but we can refer you to get tested.   Thank you for choosing us for your care.  For informational purposes only. Not to replace the advice of your health care provider. Copyright   2022 [facility]. All rights reserved. [employer] [id number] - 12/22.            Billing:  Adam was scheduled for a video visit, but was unable to establish a connection.  We agreed to meet by telephone  Telephone call duration: 28 minutes  Total time spent, including chart review and documentation: 38 minutes    Patient Status:  The patient is being referred to long term community psychiatry care and provider will provide bridging until patient is established with new community provider.              Care team has reviewed attendance agreement with patient. Patient advised that two failed appointments within 6 months may lead to termination of current episode of care.

## 2023-04-05 NOTE — PATIENT INSTRUCTIONS
Continue buspirone 30 mg twice daily.     Continue propranolol 20 mg at bedtime.     Continue lorazepam 0.5 mg daily as needed for anxiety.    Start venlafaxine ER 37.5 mg daily for one week, then increase to 75 mg daily.     Continue individual therapy.     Exercise 30 min 3 times per week.     Continue all other medications per your primary care provider.    Schedule an appointment with me in 4 weeks or sooner as needed.  You may call [facility] at [phone] to schedule.    Mobile Resources:    Go to the Emergency Department as needed or call after hours crisis line at [phone].    To schedule individual or family therapy, call [facility] at [phone].   Follow up with primary care provider as planned or sooner for acute medical concerns.  Call the psychiatric nurse line with medication questions or concerns at [phone].  Mam[employer]edia[employer] may be used to communicate with your provider, but this is not intended to be used for emergencies.    Community Resources:    National Suicide Prevention Lifeline: 421.775.5386 (TTY: 995.161.4284). Call anytime for help.  (www.suicidepreventionlifeline.org)  National [place] on Mental Illness (www.rosalio.org): 282.593.2692 or 435-998-8435.   Mental Health Association (www.mentalhealth.org): 571.443.2674 or 909-811-9309.  [place] Crisis Text Line: Text [place] to 259639  Suicide LifeLine Chat: suicidepreventionlifeline.org/chat  Patient Education   Collaborative Care Psychiatry Service  What to Expect  Here's what to expect from your Collaborative Care Psychiatry Service (CCPS).   About CCPS  CCPS means 2 people work together to help you get better. You'll meet with a behavioral health clinician and a psychiatric doctor. A behavioral health clinician helps people with mental health problems by talking with them. A psychiatric doctor helps people by giving them medicine.  How it works  At every visit, you'll see the behavioral health  "clinician (C) first. They'll talk with you about how you're doing and teach you how to feel better.   Then you'll see the psychiatric doctor. This doctor can help you deal with troubling thoughts and feelings by giving you medicine. They'll make sure you know the plan for your care.   CCPS usually takes 3 to 6 visits. If you need more visits, we may have you start seeing a different psychiatric doctor for ongoing care.  If you have any questions or concerns, we'll be glad to talk with you.  About visits  Be open  At your visits, please talk openly about your problems. It may feel hard, but it's the best way for us to help you.  Cancelling visits  If you can't come to your visit, please call us right away at [phone]. If you don't cancel at least 24 hours (1 full day) before your visit, that's \"late cancellation.\"  Being late to visits  Being very late is the same as not showing up. You will be a \"no show\" if:  Your appointment starts with a [facility], and you're more than 15 minutes late for a 30-minute (half hour) visit. This will also cancel your appointment with the psychiatric doctor.  Your appointment is with a psychiatric doctor only, and you're more than 15 minutes late for a 30-minute (half hour) visit.  Your appointment is with a psychiatric doctor only, and you're more than 30 minutes late for a 60-minute (full hour) visit.  If you cancel late or don't show up 2 times within 6 months, we may end your care.   Getting help between visits  If you need help between visits, you can call us Monday to Friday from 8 a.m. to 4:30 p.m. at [phone].  Emergency care  Call 911 or go to the nearest emergency department if your life or someone else's life is in danger.  Call 888 anytime to reach the national Suicide and Crisis hotline.  Medicine refills  To refill your medicine, call your pharmacy. You can also call [facility]'s Behavioral Access at [phone], Monday to Friday, 8 a.m. to 4:30 p.m. It " can take 1 to 3 business days to get a refill.   Forms, letters, and tests  You may have papers to fill out, like FMLA, short-term disability, and workability. We can help you with these forms at your visits, but you must have an appointment. You may need more than 1 visit for this, to be in an intensive therapy program, or both.  Before we can give you medicine for ADHD, we may refer you to get tested for it or confirm it another way.  We may not be able to give you an emotional support animal letter.  We don't do mental health checks ordered by the court.   We don't do mental health testing, but we can refer you to get tested.   Thank you for choosing us for your care.  For informational purposes only. Not to replace the advice of your health care provider. Copyright   2022 [facility]. All rights reserved. [employer] [id number] - 12/22.

## 2023-04-06 NOTE — PROGRESS NOTES
MH&A Post-Appointment Chart -check      Medications ordered this visit were e-scribed.  Verified by order class [x] yes  [] no    List Medications: venlafaxine (EFFEXOR XR) 75 MG 24 hr capsule; venlafaxine (EFFEXOR XR) 37.5 MG 24 hr capsule    Medication changes or discontinuations were communicated to patient's pharmacy: [] yes  [x] no    UA collected [] yes  [] no  [x] n/a-virtual     Outside referrals / labs, etc support staff to follow up: [x] yes  [] no  Psychiatry/ Therapy     Future appointment was made: [] yes  [x] no  [] n/a  RTC 4 weeks   Future Appointments 4/6/2023 - 10/3/2023      Date Visit Type Length Department Provider     4/24/2023  1:30 PM [facility] PREVENTATIVE ADULT VISIT 30 min [facility] Lulu Cope MD    Location Instructions:     [facility] is in [address] of the [facility] at [address] in [place]. The building is at the intersection with [facility] and along [facility]. This is the large, blue, glass building with a sculpture on the roof; please note there is no [place] signage on the exterior. Free lot parking is available.                   Dictation completed at time of chart check: [x] yes  [] no    I have checked the documentation for today s encounters and the above information has been reviewed and completed.      Zaira Suarez, HEATHER on April 6, 2023 at 1:42 PM

## 2023-04-24 ENCOUNTER — OFFICE VISIT (OUTPATIENT)
Dept: FAMILY MEDICINE | Facility: CLINIC | Age: 33
End: 2023-04-24
Payer: COMMERCIAL

## 2023-04-24 VITALS
HEIGHT: 73 IN | BODY MASS INDEX: 29.69 KG/M2 | RESPIRATION RATE: 14 BRPM | SYSTOLIC BLOOD PRESSURE: 128 MMHG | OXYGEN SATURATION: 97 % | DIASTOLIC BLOOD PRESSURE: 76 MMHG | TEMPERATURE: 97.1 F | HEART RATE: 54 BPM | WEIGHT: 224 LBS

## 2023-04-24 DIAGNOSIS — Z77.018 EXPOSURE TO HEAVY METALS: ICD-10-CM

## 2023-04-24 DIAGNOSIS — Z00.00 ENCOUNTER FOR PREVENTATIVE ADULT HEALTH CARE EXAMINATION: ICD-10-CM

## 2023-04-24 DIAGNOSIS — R11.0 NAUSEA: ICD-10-CM

## 2023-04-24 DIAGNOSIS — Z00.00 ROUTINE GENERAL MEDICAL EXAMINATION AT A HEALTH CARE FACILITY: Primary | ICD-10-CM

## 2023-04-24 LAB
ALBUMIN SERPL BCG-MCNC: 4.7 G/DL (ref 3.5–5.2)
ALP SERPL-CCNC: 73 U/L (ref 40–129)
ALT SERPL W P-5'-P-CCNC: 24 U/L (ref 10–50)
ANION GAP SERPL CALCULATED.3IONS-SCNC: 11 MMOL/L (ref 7–15)
AST SERPL W P-5'-P-CCNC: 30 U/L (ref 10–50)
BILIRUB SERPL-MCNC: 0.8 MG/DL
BUN SERPL-MCNC: 9.3 MG/DL (ref 6–20)
CALCIUM SERPL-MCNC: 9.6 MG/DL (ref 8.6–10)
CHLORIDE SERPL-SCNC: 104 MMOL/L (ref 98–107)
CHOLEST SERPL-MCNC: 192 MG/DL
CREAT SERPL-MCNC: 1 MG/DL (ref 0.67–1.17)
DEPRECATED HCO3 PLAS-SCNC: 24 MMOL/L (ref 22–29)
ERYTHROCYTE [DISTWIDTH] IN BLOOD BY AUTOMATED COUNT: 12.1 % (ref 10–15)
FERRITIN SERPL-MCNC: 140 NG/ML (ref 31–409)
GFR SERPL CREATININE-BSD FRML MDRD: >90 ML/MIN/1.73M2
GLUCOSE SERPL-MCNC: 90 MG/DL (ref 70–99)
HBA1C MFR BLD: 5 % (ref 0–5.6)
HCT VFR BLD AUTO: 41.8 % (ref 40–53)
HDLC SERPL-MCNC: 56 MG/DL
HGB BLD-MCNC: 14.7 G/DL (ref 13.3–17.7)
LDLC SERPL CALC-MCNC: 123 MG/DL
MCH RBC QN AUTO: 32.9 PG (ref 26.5–33)
MCHC RBC AUTO-ENTMCNC: 35.2 G/DL (ref 31.5–36.5)
MCV RBC AUTO: 94 FL (ref 78–100)
NONHDLC SERPL-MCNC: 136 MG/DL
PLATELET # BLD AUTO: 187 10E3/UL (ref 150–450)
POTASSIUM SERPL-SCNC: 4.3 MMOL/L (ref 3.4–5.3)
PROT SERPL-MCNC: 7.3 G/DL (ref 6.4–8.3)
RBC # BLD AUTO: 4.47 10E6/UL (ref 4.4–5.9)
SODIUM SERPL-SCNC: 139 MMOL/L (ref 136–145)
TRIGL SERPL-MCNC: 63 MG/DL
WBC # BLD AUTO: 4 10E3/UL (ref 4–11)

## 2023-04-24 PROCEDURE — 82728 ASSAY OF FERRITIN: CPT | Performed by: FAMILY MEDICINE

## 2023-04-24 PROCEDURE — 85027 COMPLETE CBC AUTOMATED: CPT | Performed by: FAMILY MEDICINE

## 2023-04-24 PROCEDURE — 82175 ASSAY OF ARSENIC: CPT | Mod: 90 | Performed by: FAMILY MEDICINE

## 2023-04-24 PROCEDURE — 83655 ASSAY OF LEAD: CPT | Mod: 90 | Performed by: FAMILY MEDICINE

## 2023-04-24 PROCEDURE — 82300 ASSAY OF CADMIUM: CPT | Mod: 90 | Performed by: FAMILY MEDICINE

## 2023-04-24 PROCEDURE — 83036 HEMOGLOBIN GLYCOSYLATED A1C: CPT | Performed by: FAMILY MEDICINE

## 2023-04-24 PROCEDURE — 36415 COLL VENOUS BLD VENIPUNCTURE: CPT | Performed by: FAMILY MEDICINE

## 2023-04-24 PROCEDURE — 99395 PREV VISIT EST AGE 18-39: CPT | Performed by: FAMILY MEDICINE

## 2023-04-24 PROCEDURE — 83885 ASSAY OF NICKEL: CPT | Mod: 90 | Performed by: FAMILY MEDICINE

## 2023-04-24 PROCEDURE — 80053 COMPREHEN METABOLIC PANEL: CPT | Performed by: FAMILY MEDICINE

## 2023-04-24 PROCEDURE — 80061 LIPID PANEL: CPT | Performed by: FAMILY MEDICINE

## 2023-04-24 PROCEDURE — 83825 ASSAY OF MERCURY: CPT | Mod: 90 | Performed by: FAMILY MEDICINE

## 2023-04-24 PROCEDURE — 99000 SPECIMEN HANDLING OFFICE-LAB: CPT | Performed by: FAMILY MEDICINE

## 2023-04-24 RX ORDER — ONDANSETRON 4 MG/1
4 TABLET, ORALLY DISINTEGRATING ORAL EVERY 8 HOURS PRN
Qty: 16 TABLET | Refills: 3 | Status: SHIPPED | OUTPATIENT
Start: 2023-04-24 | End: 2024-07-03

## 2023-04-24 ASSESSMENT — ENCOUNTER SYMPTOMS
EYE PAIN: 0
HEMATURIA: 0
PARESTHESIAS: 0
MYALGIAS: 0
HEMATOCHEZIA: 0
HEARTBURN: 0
DYSURIA: 0
NERVOUS/ANXIOUS: 0
CONSTIPATION: 0
PALPITATIONS: 0
ABDOMINAL PAIN: 0
HEADACHES: 0
FREQUENCY: 0
ARTHRALGIAS: 0
COUGH: 0
NAUSEA: 0
WEAKNESS: 0
DIARRHEA: 0
CHILLS: 0
JOINT SWELLING: 0
FEVER: 0
DIZZINESS: 0
SORE THROAT: 0
SHORTNESS OF BREATH: 0

## 2023-04-24 ASSESSMENT — ANXIETY QUESTIONNAIRES
7. FEELING AFRAID AS IF SOMETHING AWFUL MIGHT HAPPEN: NOT AT ALL
4. TROUBLE RELAXING: SEVERAL DAYS
3. WORRYING TOO MUCH ABOUT DIFFERENT THINGS: SEVERAL DAYS
2. NOT BEING ABLE TO STOP OR CONTROL WORRYING: SEVERAL DAYS
7. FEELING AFRAID AS IF SOMETHING AWFUL MIGHT HAPPEN: NOT AT ALL
1. FEELING NERVOUS, ANXIOUS, OR ON EDGE: SEVERAL DAYS
GAD7 TOTAL SCORE: 7
5. BEING SO RESTLESS THAT IT IS HARD TO SIT STILL: SEVERAL DAYS
8. IF YOU CHECKED OFF ANY PROBLEMS, HOW DIFFICULT HAVE THESE MADE IT FOR YOU TO DO YOUR WORK, TAKE CARE OF THINGS AT HOME, OR GET ALONG WITH OTHER PEOPLE?: SOMEWHAT DIFFICULT
GAD7 TOTAL SCORE: 7
IF YOU CHECKED OFF ANY PROBLEMS ON THIS QUESTIONNAIRE, HOW DIFFICULT HAVE THESE PROBLEMS MADE IT FOR YOU TO DO YOUR WORK, TAKE CARE OF THINGS AT HOME, OR GET ALONG WITH OTHER PEOPLE: SOMEWHAT DIFFICULT
6. BECOMING EASILY ANNOYED OR IRRITABLE: MORE THAN HALF THE DAYS

## 2023-04-24 ASSESSMENT — PAIN SCALES - GENERAL: PAINLEVEL: NO PAIN (0)

## 2023-04-24 NOTE — PROGRESS NOTES
SUBJECTIVE:   CC: Adam is an 33 year old who presents for preventative health visit.       4/24/2023     1:20 PM   Additional Questions   Roomed by Travon GARCIA     Patient has been advised of split billing requirements and indicates understanding: Yes  Healthy Habits:     Getting at least 3 servings of Calcium per day:  Yes    Bi-annual eye exam:  Yes    Dental care twice a year:  NO    Sleep apnea or symptoms of sleep apnea:  Excessive snoring    Diet:  Regular (no restrictions)    Frequency of exercise:  2-3 days/week    Duration of exercise:  15-30 minutes    Taking medications regularly:  Yes    Medication side effects:  Not applicable    PHQ-2 Total Score: 2    Additional concerns today:  Yes  Answers for HPI/ROS submitted by the patient on 4/24/2023  JELANI 7 TOTAL SCORE: 7    Conflicting answers have been found for some questions. Please document the patient's answers manually.      BP Readings from Last 6 Encounters:   04/24/23 128/76   02/09/22 136/79   10/20/21 138/82   02/20/20 120/73   02/14/20 132/86   01/16/20 129/82     Noticing some shoulder pain. Cracking sounds.   Had an MRI again in 2019 which revealed another small tear.   Patient also had a softball related injury in 08/2022.     Today's PHQ-2 Score:       4/24/2023    11:44 AM   PHQ-2 ( 1999 [employer])   Q1: Little interest or pleasure in doing things 1   Q2: Feeling down, depressed or hopeless 1   PHQ-2 Score 2   Q1: Little interest or pleasure in doing things Several days   Q2: Feeling down, depressed or hopeless Several days   PHQ-2 Score 2          8/2/2022     6:39 AM 3/15/2023     9:54 AM 4/5/2023    11:22 AM   PHQ   PHQ-9 Total Score 6 10 6   Q9: Thoughts of better off dead/self-harm past 2 weeks Not at all Not at all Not at all         3/15/2023     9:55 AM 4/5/2023    11:23 AM 4/24/2023    11:42 AM   JELANI-7 SCORE   Total Score 6 (mild anxiety) 8 (mild anxiety) 7 (mild anxiety)   Total Score 6 8 7     Have you ever done Advance Care Planning?  "(For example, a Health Directive, POLST, or a discussion with a medical provider or your loved ones about your wishes): Yes, advance care planning is on file.    Social History     Tobacco Use     Smoking status: Never     Smokeless tobacco: Never   Vaping Use     Vaping status: Not on file   Substance Use Topics     Alcohol use: Yes     Alcohol/week: 10.0 standard drinks of alcohol     Types: 10 Standard drinks or equivalent per week     Comment: 2- 5 drinks per occasion           4/24/2023    11:44 AM   Alcohol Use   Prescreen: >3 drinks/day or >7 drinks/week? Yes   AUDIT SCORE  10     Last PSA: No results found for: PSA    Reviewed orders with patient. Reviewed health maintenance and updated orders accordingly - Yes  Lab work is in process    Reviewed and updated as needed this visit by clinical staff   Tobacco  Allergies  Meds  Problems  Med Hx  Surg Hx  Fam Hx          Reviewed and updated as needed this visit by Provider   Tobacco  Allergies  Meds  Problems  Med Hx  Surg Hx  Fam Hx             Review of Systems   Constitutional: Negative for chills and fever.   HENT: Negative for ear pain, hearing loss and sore throat.    Eyes: Negative for pain and visual disturbance.   Respiratory: Negative for shortness of breath.    Cardiovascular: Negative for chest pain, palpitations and peripheral edema.   Gastrointestinal: Negative for abdominal pain, constipation, diarrhea, heartburn, hematochezia and nausea.   Genitourinary: Negative for dysuria, frequency, genital sores, hematuria, impotence, penile discharge and urgency.   Musculoskeletal: Negative for arthralgias and joint swelling.   Skin: Negative for rash.   Neurological: Negative for dizziness, weakness and paresthesias.   Psychiatric/Behavioral: Positive for mood changes.     OBJECTIVE:   /76   Pulse 54   Temp 97.1  F (36.2  C) (Temporal)   Resp 14   Ht 1.842 m (6' 0.5\")   Wt 101.6 kg (224 lb)   SpO2 97%   BMI 29.96 kg/m  "     Physical Exam  GENERAL: healthy, alert and no distress  EYES: Eyes grossly normal to inspection, PERRL and conjunctivae and sclerae normal  HENT: ear canals and TM's normal, nose and mouth without ulcers or lesions  NECK: no adenopathy, no asymmetry, masses, or scars and thyroid normal to palpation  RESP: lungs clear to auscultation - no rales, rhonchi or wheezes  CV: regular rate and rhythm, normal S1 S2, no S3 or S4, no murmur, click or rub, no peripheral edema and peripheral pulses strong  ABDOMEN: soft, nontender, no hepatosplenomegaly, no masses and bowel sounds normal  MS: no gross musculoskeletal defects noted, no edema  SKIN: no suspicious lesions or rashes  NEURO: Normal strength and tone, mentation intact and speech normal  PSYCH: mentation appears normal, affect normal/bright    Diagnostic Test Results:  Labs reviewed in Epic    ASSESSMENT/PLAN:       ICD-10-CM    1. Routine general medical examination at a health care facility  Z00.00       2. Exposure to heavy metals  Z77.018 Mercury, blood     Cadmium level     Arsenic level     Lead Venous Blood Confirm     Nickel Serum     Ferritin     Mercury, blood     Cadmium level     Arsenic level     Lead Venous Blood Confirm     Nickel Serum     Ferritin      3. Encounter for preventative adult health care examination  Z00.00 Comprehensive metabolic panel (BMP + Alb, Alk Phos, ALT, AST, Total. Bili, TP)     CBC with platelets     Hemoglobin A1c     Lipid panel reflex to direct LDL Fasting     Comprehensive metabolic panel (BMP + Alb, Alk Phos, ALT, AST, Total. Bili, TP)     CBC with platelets     Hemoglobin A1c     Lipid panel reflex to direct LDL Fasting      4. Nausea  R11.0 ondansetron (ZOFRAN ODT) 4 MG ODT tab          Patient has been advised of split billing requirements and indicates understanding: Yes      COUNSELING:   Reviewed preventive health counseling, as reflected in patient instructions       Regular exercise       Healthy  "diet/nutrition      BMI:   Estimated body mass index is 29.96 kg/m  as calculated from the following:    Height as of this encounter: 1.842 m (6' 0.5\").    Weight as of this encounter: 101.6 kg (224 lb).         He reports that he has never smoked. He has never used smokeless tobacco.      Lulu Cope MD  [facility]  "

## 2023-04-26 LAB
ARSENIC BLD-MCNC: <10 UG/L
CADMIUM BLD-MCNC: <1 UG/L
LEAD BLDV-MCNC: <2 UG/DL
MERCURY BLD-MCNC: <2.5 UG/L
NICKEL SERPL-MCNC: <2 UG/L

## 2023-04-26 NOTE — RESULT ENCOUNTER NOTE
"Dear Adam,   Here are your recent results. All results are within normal limits except for a mild elevation of your LDL \"bad cholesterol\"    I would recommend monitoring your dietary intake of fats and carbohydrates.  Increase your physical activity.  We will recheck your cholesterol in 12 months    Best Regards   Lulu Cope MD"

## 2023-05-09 ENCOUNTER — VIRTUAL VISIT (OUTPATIENT)
Dept: PSYCHIATRY | Facility: CLINIC | Age: 33
End: 2023-05-09
Payer: COMMERCIAL

## 2023-05-09 DIAGNOSIS — F41.1 GENERALIZED ANXIETY DISORDER: ICD-10-CM

## 2023-05-09 DIAGNOSIS — F33.1 MODERATE EPISODE OF RECURRENT MAJOR DEPRESSIVE DISORDER (H): ICD-10-CM

## 2023-05-09 PROCEDURE — 99214 OFFICE O/P EST MOD 30 MIN: CPT | Mod: 95 | Performed by: PSYCHIATRY & NEUROLOGY

## 2023-05-09 RX ORDER — VENLAFAXINE HYDROCHLORIDE 150 MG/1
150 CAPSULE, EXTENDED RELEASE ORAL DAILY
Qty: 90 CAPSULE | Refills: 1 | Status: SHIPPED | OUTPATIENT
Start: 2023-05-09 | End: 2023-10-12

## 2023-05-09 RX ORDER — CYPROHEPTADINE HYDROCHLORIDE 4 MG/1
TABLET ORAL
Qty: 30 TABLET | Refills: 5 | Status: SHIPPED | OUTPATIENT
Start: 2023-05-09 | End: 2024-05-13

## 2023-05-09 NOTE — NURSING NOTE
Is the patient currently in the state of [place]? YES    Visit mode:VIDEO    If the visit is dropped, the patient can be reconnected by: TELEPHONE VISIT: Phone number:   Telephone Information:   Mobile [phone]       Will anyone else be joining the visit? No  (If patient encounters technical issues they should call [phone])    How would you like to obtain your AVS? MyChart    Are changes needed to the allergy or medication list? NO    Rooming Documentation: Assigned questionnaire(s) completed and Care team has reviewed attendance agreement with patient. Patient advised that two failed appointments within 6 months may lead to termination of current episode of care.      Reason for visit: Telephone     PATY Cox

## 2023-05-09 NOTE — PATIENT INSTRUCTIONS
Continue buspirone 30 mg twice daily.     Continue propranolol 20 mg at bedtime.     Continue lorazepam 0.5 mg daily as needed for anxiety.     Increase venlafaxine ER to 150 mg daily.     Continue individual therapy.     Exercise 30 min 3 times per week.     Continue all other medications per your primary care provider.    Schedule an appointment with me in 3 weeks or sooner as needed.  You may call [facility] at [phone] to schedule.    [place] Resources:    Go to the Emergency Department as needed or call after hours crisis line at [phone].    To schedule individual or family therapy, call [facility] at [phone].   Follow up with primary care provider as planned or sooner for acute medical concerns.  Call the psychiatric nurse line with medication questions or concerns at [phone].  [employer] may be used to communicate with your provider, but this is not intended to be used for emergencies.    Community Resources:    National Suicide Prevention Lifeline: 813.201.6304 (TTY: 438.625.9845). Call anytime for help.  (www.suicidepreventionlifeline.org)  National [place] on Mental Illness (www.rosalio.org): 668.222.3837 or 770-952-0457.   Mental Health Association (www.mentalhealth.org): 747.864.6695 or 057-899-9100.  [place] Crisis Text Line: Text [place] to 431768  Suicide LifeLine Chat: suicidepreventionlifeline.org/chat    Patient Education   Collaborative Care Psychiatry Service  What to Expect  Here's what to expect from your Collaborative Care Psychiatry Service (CCPS).   About CCPS  CCPS means 2 people work together to help you get better. You'll meet with a behavioral health clinician and a psychiatric doctor. A behavioral health clinician helps people with mental health problems by talking with them. A psychiatric doctor helps people by giving them medicine.  How it works  At every visit, you'll see the behavioral health clinician (BHC) first. They'll talk  "with you about how you're doing and teach you how to feel better.   Then you'll see the psychiatric doctor. This doctor can help you deal with troubling thoughts and feelings by giving you medicine. They'll make sure you know the plan for your care.   CCPS usually takes 3 to 6 visits. If you need more visits, we may have you start seeing a different psychiatric doctor for ongoing care.  If you have any questions or concerns, we'll be glad to talk with you.  About visits  Be open  At your visits, please talk openly about your problems. It may feel hard, but it's the best way for us to help you.  Cancelling visits  If you can't come to your visit, please call us right away at [phone]. If you don't cancel at least 24 hours (1 full day) before your visit, that's \"late cancellation.\"  Being late to visits  Being very late is the same as not showing up. You will be a \"no show\" if:  Your appointment starts with a [facility], and you're more than 15 minutes late for a 30-minute (half hour) visit. This will also cancel your appointment with the psychiatric doctor.  Your appointment is with a psychiatric doctor only, and you're more than 15 minutes late for a 30-minute (half hour) visit.  Your appointment is with a psychiatric doctor only, and you're more than 30 minutes late for a 60-minute (full hour) visit.  If you cancel late or don't show up 2 times within 6 months, we may end your care.   Getting help between visits  If you need help between visits, you can call us Monday to Friday from 8 a.m. to 4:30 p.m. at [phone].  Emergency care  Call 911 or go to the nearest emergency department if your life or someone else's life is in danger.  Call 438 anytime to reach the national Suicide and Crisis hotline.  Medicine refills  To refill your medicine, call your pharmacy. You can also call [facility]'s [facility] at [phone], Monday to Friday, 8 a.m. to 4:30 p.m. It can take 1 to 3 business days to get " a refill.   Forms, letters, and tests  You may have papers to fill out, like FMLA, short-term disability, and workability. We can help you with these forms at your visits, but you must have an appointment. You may need more than 1 visit for this, to be in an intensive therapy program, or both.  Before we can give you medicine for ADHD, we may refer you to get tested for it or confirm it another way.  We may not be able to give you an emotional support animal letter.  We don't do mental health checks ordered by the court.   We don't do mental health testing, but we can refer you to get tested.   Thank you for choosing us for your care.  For informational purposes only. Not to replace the advice of your health care provider. Copyright   2022 [facility]. All rights reserved. [employer] [id number] - 12/22.

## 2023-05-09 NOTE — PROGRESS NOTES
"Virtual Visit Details  Type of service:  Video Visit   Originating Location (pt. Location): Home  Distant Location (provider location):  Off-site  Platform used for Video Visit: [facility]         Outpatient Psychiatric Progress Note    Name: Adam Fish   : 1990                    Primary Care Provider: Lulu Cope MD   Therapist:  Shanon Neff, [facility]           PHQ-9 scores:      2022     6:39 AM 3/15/2023     9:54 AM 2023    11:22 AM   PHQ-9 SCORE   PHQ-9 Total Score MyChart 6 (Mild depression) 10 (Moderate depression) 6 (Mild depression)   PHQ-9 Total Score 6 10 6       JELANI-7 scores:      3/15/2023     9:55 AM 2023    11:23 AM 2023    11:42 AM   JELANI-7 SCORE   Total Score 6 (mild anxiety) 8 (mild anxiety) 7 (mild anxiety)   Total Score 6 8 7       Patient Identification:  Adam is a 33 year old year old male  who presents for return visit with me.  Patient attended the session alone.     Interim History:  Adam reports he had a little nausea with venlafaxine, but was prescribed Zofran by his primary care provider.  He reports having an initial \"boost\" in his mood.  He is wondering if it would be beneficial to increase the dose.  We agreed to increase it to 150 mg daily.    He does report some sexual side effects with the venlafaxine.  After a discussion of risks and benefits, we agreed to a trial of cyproheptadine 4 to 12 mg approximately 1 to 2 hours before sexual activity.    Adam reports that work is going well.  He and his partner just got away for a little holiday.  They are doing couples counseling, which he feels is going well.    He has not heard from the treatment resistant clinic yet.  He was given a phone number to contact them.    Vital Signs:   There were no vitals taken for this visit.    Labs:  Lab Results   Component Value Date    CHOL 192 2023     Lab Results   Component Value Date    HDL 56 2023     Lab Results   Component Value " Epinephrine auto-injector checked by staff. Epinephrine auto-injector on person and up to date.  Expiration date 03/2020.      Hilary Escobedo RN     Date     04/24/2023     Lab Results   Component Value Date    TRIG 63 04/24/2023     No results found for: CHOLHDLRATIO  Ref Range & Units 2 wk ago 1 yr ago      Hemoglobin A1C 0.0 - 5.6 % 5.0  5.1 CM    Comment: Normal <5.7%   Prediabetes 5.7-6.4%     Diabetes 6.5% or higher      Last Comprehensive Metabolic Panel:  Sodium   Date Value Ref Range Status   04/24/2023 139 136 - 145 mmol/L Final     Potassium   Date Value Ref Range Status   04/24/2023 4.3 3.4 - 5.3 mmol/L Final   02/09/2022 4.3 3.4 - 5.3 mmol/L Final     Chloride   Date Value Ref Range Status   04/24/2023 104 98 - 107 mmol/L Final   02/09/2022 109 94 - 109 mmol/L Final     Carbon Dioxide (CO2)   Date Value Ref Range Status   04/24/2023 24 22 - 29 mmol/L Final   02/09/2022 28 20 - 32 mmol/L Final     Anion Gap   Date Value Ref Range Status   04/24/2023 11 7 - 15 mmol/L Final   02/09/2022 2 (L) 3 - 14 mmol/L Final     Glucose   Date Value Ref Range Status   04/24/2023 90 70 - 99 mg/dL Final   02/09/2022 79 70 - 99 mg/dL Final     Urea Nitrogen   Date Value Ref Range Status   04/24/2023 9.3 6.0 - 20.0 mg/dL Final   02/09/2022 12 7 - 30 mg/dL Final     Creatinine   Date Value Ref Range Status   04/24/2023 1.00 0.67 - 1.17 mg/dL Final     GFR Estimate   Date Value Ref Range Status   04/24/2023 >90 >60 mL/min/1.73m2 Final     Comment:     eGFR calculated using 2021 CKD-EPI equation.     Calcium   Date Value Ref Range Status   04/24/2023 9.6 8.6 - 10.0 mg/dL Final     Bilirubin Total   Date Value Ref Range Status   04/24/2023 0.8 <=1.2 mg/dL Final     Alkaline Phosphatase   Date Value Ref Range Status   04/24/2023 73 40 - 129 U/L Final     ALT   Date Value Ref Range Status   04/24/2023 24 10 - 50 U/L Final     AST   Date Value Ref Range Status   04/24/2023 30 10 - 50 U/L Final     Lab Results   Component Value Date    WBC 4.0 04/24/2023     Lab Results   Component Value Date    RBC 4.47 04/24/2023     Lab Results   Component Value Date    HGB 14.7  04/24/2023    HGB 15.5 06/07/2010     Lab Results   Component Value Date    HCT 41.8 04/24/2023     No components found for: MCT  Lab Results   Component Value Date    MCV 94 04/24/2023     Lab Results   Component Value Date    MCH 32.9 04/24/2023     Lab Results   Component Value Date    MCHC 35.2 04/24/2023     Lab Results   Component Value Date    RDW 12.1 04/24/2023     Lab Results   Component Value Date     04/24/2023       Current Medications:  Current Outpatient Medications   Medication     busPIRone HCl (BUSPAR) 30 MG tablet     cyproheptadine (PERIACTIN) 4 MG tablet     LORazepam (ATIVAN) 0.5 MG tablet     ondansetron (ZOFRAN ODT) 4 MG ODT tab     propranolol (INDERAL) 20 MG tablet     venlafaxine (EFFEXOR XR) 150 MG 24 hr capsule     No current facility-administered medications for this visit.        The [place] Prescription Monitoring Program has been reviewed and there are no concerns about diversionary activity for controlled substances at this time.      Mental Status Examination:  Adam is a 33-year-old man in no acute distress.  Speech is clear and normal in rate and tone.  Mood is fair.  Thoughts are logical and spontaneous with no loose associations or flight of ideas.  Thought content shows no psychosis.  No suicidal thoughts.  He is alert and oriented x3.    Assessment and Plan:    ICD-10-CM    1. Moderate episode of recurrent major depressive disorder (H)  F33.1 venlafaxine (EFFEXOR XR) 150 MG 24 hr capsule     cyproheptadine (PERIACTIN) 4 MG tablet      2. Generalized anxiety disorder  F41.1           Medical comorbidities include:   Patient Active Problem List   Diagnosis     CARDIOVASCULAR SCREENING; LDL GOAL LESS THAN 160     Depression with anxiety     Elevated BP without diagnosis of hypertension     Chronic right-sided low back pain with right-sided sciatica     Dysthymia     Kyphosis (acquired) (postural)     Poor posture     Thoracic segment dysfunction     Sacral pain      Somatic dysfunction of sacral region     Somatic dysfunction of lumbar region     Paresthesias     Generalized anxiety disorder     Alcohol abuse     Social anxiety disorder     PTSD (post-traumatic stress disorder)     Major depression, single episode     Moderate episode of recurrent major depressive disorder (H)       Treatment Plan:  Patient Instructions   Continue buspirone 30 mg twice daily.     Continue propranolol 20 mg at bedtime.     Continue lorazepam 0.5 mg daily as needed for anxiety.     Increase venlafaxine ER to 150 mg daily.     Continue individual therapy.     Exercise 30 min 3 times per week.     Continue all other medications per your primary care provider.    Schedule an appointment with me in 3 weeks or sooner as needed.  You may call [facility] at [phone] to schedule.    [place] Resources:      Go to the Emergency Department as needed or call after hours crisis line at [phone].      To schedule individual or family therapy, call [facility] at [phone].     Follow up with primary care provider as planned or sooner for acute medical concerns.    Call the psychiatric nurse line with medication questions or concerns at [phone].    [employer] may be used to communicate with your provider, but this is not intended to be used for emergencies.    Community Resources:      National Suicide Prevention Lifeline: 223.457.3946 (TTY: 260.811.3261). Call anytime for help.  (www.suicidepreventionlifeline.org)    National [place] on Mental Illness (www.rosalio.org): 783.335.9699 or 466-175-7749.     Mental Health Association (www.mentalhealth.org): 218.986.9012 or 223-441-3720.    [place] Crisis Text Line: Text [place] to 121784    Suicide LifeLine Chat: suicidepreventionlifeline.org/chat    Patient Education   Collaborative Care Psychiatry Service  What to Expect  Here's what to expect from your Collaborative Care Psychiatry Service (CCPS).   About  "CCPS  CCPS means 2 people work together to help you get better. You'll meet with a behavioral health clinician and a psychiatric doctor. A behavioral health clinician helps people with mental health problems by talking with them. A psychiatric doctor helps people by giving them medicine.  How it works  At every visit, you'll see the behavioral health clinician (BHC) first. They'll talk with you about how you're doing and teach you how to feel better.   Then you'll see the psychiatric doctor. This doctor can help you deal with troubling thoughts and feelings by giving you medicine. They'll make sure you know the plan for your care.   CCPS usually takes 3 to 6 visits. If you need more visits, we may have you start seeing a different psychiatric doctor for ongoing care.  If you have any questions or concerns, we'll be glad to talk with you.  About visits  Be open  At your visits, please talk openly about your problems. It may feel hard, but it's the best way for us to help you.  Cancelling visits  If you can't come to your visit, please call us right away at [phone]. If you don't cancel at least 24 hours (1 full day) before your visit, that's \"late cancellation.\"  Being late to visits  Being very late is the same as not showing up. You will be a \"no show\" if:    Your appointment starts with a BHC, and you're more than 15 minutes late for a 30-minute (half hour) visit. This will also cancel your appointment with the psychiatric doctor.    Your appointment is with a psychiatric doctor only, and you're more than 15 minutes late for a 30-minute (half hour) visit.    Your appointment is with a psychiatric doctor only, and you're more than 30 minutes late for a 60-minute (full hour) visit.  If you cancel late or don't show up 2 times within 6 months, we may end your care.   Getting help between visits  If you need help between visits, you can call us Monday to Friday from 8 a.m. to 4:30 p.m. at " [phone].  Emergency care  Call 911 or go to the nearest emergency department if your life or someone else's life is in danger.  Call 418 anytime to reach the national Suicide and Crisis hotline.  Medicine refills  To refill your medicine, call your pharmacy. You can also call [facility]'s [facility] at [phone], Monday to Friday, 8 a.m. to 4:30 p.m. It can take 1 to 3 business days to get a refill.   Forms, letters, and tests  You may have papers to fill out, like FMLA, short-term disability, and workability. We can help you with these forms at your visits, but you must have an appointment. You may need more than 1 visit for this, to be in an intensive therapy program, or both.  Before we can give you medicine for ADHD, we may refer you to get tested for it or confirm it another way.  We may not be able to give you an emotional support animal letter.  We don't do mental health checks ordered by the court.   We don't do mental health testing, but we can refer you to get tested.   Thank you for choosing us for your care.  For informational purposes only. Not to replace the advice of your health care provider. Copyright   2022 [facility]. All rights reserved. [employer] [id number] - 12/22.       Administrative Billing:   Adam was scheduled for a video consultation, but was unable to connect due to technical difficulties.  We agreed to meet by phone.    Telephone call duration: 21 minutes  Total time spent, including chart review and documentation: 31 minutes    Patient Status:  The patient is being referred to long term community psychiatry care and provider will provide bridging until patient is established with new community provider.

## 2023-05-31 ENCOUNTER — VIRTUAL VISIT (OUTPATIENT)
Dept: PSYCHIATRY | Facility: CLINIC | Age: 33
End: 2023-05-31
Payer: COMMERCIAL

## 2023-05-31 DIAGNOSIS — F33.1 MODERATE EPISODE OF RECURRENT MAJOR DEPRESSIVE DISORDER (H): Primary | ICD-10-CM

## 2023-05-31 DIAGNOSIS — F34.1 DYSTHYMIA: ICD-10-CM

## 2023-05-31 DIAGNOSIS — F41.1 GENERALIZED ANXIETY DISORDER: ICD-10-CM

## 2023-05-31 PROCEDURE — 99214 OFFICE O/P EST MOD 30 MIN: CPT | Mod: 95 | Performed by: PSYCHIATRY & NEUROLOGY

## 2023-05-31 RX ORDER — VENLAFAXINE HYDROCHLORIDE 75 MG/1
75 CAPSULE, EXTENDED RELEASE ORAL DAILY
Qty: 90 CAPSULE | Refills: 1 | Status: SHIPPED | OUTPATIENT
Start: 2023-05-31 | End: 2023-10-12

## 2023-05-31 RX ORDER — PROPRANOLOL HYDROCHLORIDE 20 MG/1
20 TABLET ORAL DAILY
Qty: 90 TABLET | Refills: 1 | Status: SHIPPED | OUTPATIENT
Start: 2023-05-31 | End: 2023-10-12

## 2023-05-31 RX ORDER — BUSPIRONE HYDROCHLORIDE 30 MG/1
30 TABLET ORAL 2 TIMES DAILY
Qty: 180 TABLET | Refills: 1 | Status: SHIPPED | OUTPATIENT
Start: 2023-05-31 | End: 2023-10-12

## 2023-05-31 RX ORDER — LORAZEPAM 0.5 MG/1
0.5 TABLET ORAL DAILY PRN
Qty: 90 TABLET | Refills: 1 | Status: SHIPPED | OUTPATIENT
Start: 2023-05-31 | End: 2023-10-12

## 2023-05-31 NOTE — PROGRESS NOTES
"Virtual Visit Details    Type of service:  Telephone Visit   Patient location:  Home  Provider location: On site          Outpatient Psychiatric Progress Note    Name: Adam Fish   : 1990                    Primary Care Provider: Lulu Cope MD   Therapist: Shanon Neff, [facility]            PHQ-9 scores:      2022     6:39 AM 3/15/2023     9:54 AM 2023    11:22 AM   PHQ-9 SCORE   PHQ-9 Total Score MyChart 6 (Mild depression) 10 (Moderate depression) 6 (Mild depression)   PHQ-9 Total Score 6 10 6       JELANI-7 scores:      3/15/2023     9:55 AM 2023    11:23 AM 2023    11:42 AM   JELANI-7 SCORE   Total Score 6 (mild anxiety) 8 (mild anxiety) 7 (mild anxiety)   Total Score 6 8 7       Patient Identification:  Adam is a 33 year old year old male  who presents for return visit with me.  Patient attended the session alone.     Interim History:  Adam reports that he is having a busy day.  His mood has improved somewhat with the increase in venlafaxine, he rates it as 6 out of 10 with 10 being the best.  It will occasionally be as low as 2 and sometimes as high as 7 or 8.  He would like to increase the venlafaxine if possible.  We agreed to increase it to 225 mg daily.  If he does not tolerate that, he can certainly reduce the dose back to 150 mg if needed.    He did try cyproheptadine, but only on one occasion dose.  He reports that it was not effective, but he plans to try again at a higher dose.    Adam continues to see a therapist.  He and his partner also see a therapist.  He plans to have a heart-to-heart talk with one of his siblings who has always \"put him down.\"    Adam was referred for the treatment resistant clinic and ongoing psychiatric care at his last appointment, but he has not yet scheduled any appointments.    Vital Signs:   There were no vitals taken for this visit.      Current Medications:  Current Outpatient Medications   Medication     busPIRone " HCl (BUSPAR) 30 MG tablet     cyproheptadine (PERIACTIN) 4 MG tablet     LORazepam (ATIVAN) 0.5 MG tablet     ondansetron (ZOFRAN ODT) 4 MG ODT tab     propranolol (INDERAL) 20 MG tablet     venlafaxine (EFFEXOR XR) 150 MG 24 hr capsule     venlafaxine (EFFEXOR XR) 75 MG 24 hr capsule     No current facility-administered medications for this visit.        The [place] Prescription Monitoring Program has been reviewed and there are no concerns about diversionary activity for controlled substances at this time.      Mental Status Examination:  Adam is a 33-year-old man in no acute distress.  Speech is clear and normal in rate and tone.  Mood is fair.  Thoughts are logical and spontaneous with no loose associations or flight of ideas.  Thought content shows no psychosis.  No suicidal thoughts.  He is alert and oriented x3.    Assessment and Plan:    ICD-10-CM    1. Moderate episode of recurrent major depressive disorder (H)  F33.1 venlafaxine (EFFEXOR XR) 75 MG 24 hr capsule     [facility]      2. Generalized anxiety disorder  F41.1 busPIRone HCl (BUSPAR) 30 MG tablet     LORazepam (ATIVAN) 0.5 MG tablet     propranolol (INDERAL) 20 MG tablet      3. Dysthymia  F34.1           Medical comorbidities include:   Patient Active Problem List   Diagnosis     CARDIOVASCULAR SCREENING; LDL GOAL LESS THAN 160     Depression with anxiety     Elevated BP without diagnosis of hypertension     Chronic right-sided low back pain with right-sided sciatica     Dysthymia     Kyphosis (acquired) (postural)     Poor posture     Thoracic segment dysfunction     Sacral pain     Somatic dysfunction of sacral region     Somatic dysfunction of lumbar region     Paresthesias     Generalized anxiety disorder     Alcohol abuse     Social anxiety disorder     PTSD (post-traumatic stress disorder)     Major depression, single episode     Moderate episode of recurrent major depressive disorder (H)       Treatment  Plan:  Patient Instructions   Continue buspirone 30 mg twice daily.     Continue propranolol 20 mg at bedtime.     Continue lorazepam 0.5 mg daily as needed for anxiety.     Increase venlafaxine ER to 225 mg daily.     Continue individual therapy.     Continue exercise 30 min 3 times per week.     Continue all other medications per your primary care provider.    Per our conversation, you are being referred to a psychiatric provider in the community.  You should receive a call, or you may call [facility] at [phone] to schedule.    [place] Resources:      Go to the Emergency Department as needed or call after hours crisis line at [phone].      To schedule individual or family therapy, call [facility] at [phone].     Follow up with primary care provider as planned or sooner for acute medical concerns.    Call the psychiatric nurse line with medication questions or concerns at [phone].    [employer] may be used to communicate with your provider, but this is not intended to be used for emergencies.    Community Resources:      National Suicide Prevention Lifeline: 938.913.9814 (TTY: 328.444.6227). Call anytime for help.  (www.suicidepreventionlifeline.org)    National [place] on Mental Illness (www.rosalio.org): 454.225.6946 or 638-879-1752.     Mental Health Association (www.mentalhealth.org): 719.134.1277 or 334-479-1257.    [place] Crisis Text Line: Text [place] to 023161    Suicide LifeLine Chat: suicidepreventionlifeline.org/chat      Patient Education   Collaborative Care Psychiatry Service  What to Expect  Here's what to expect from your Collaborative Care Psychiatry Service (CCPS).   About CCPS  CCPS means 2 people work together to help you get better. You'll meet with a behavioral health clinician and a psychiatric doctor. A behavioral health clinician helps people with mental health problems by talking with them. A psychiatric doctor helps people by giving  "them medicine.  How it works  At every visit, you'll see the behavioral health clinician (BHC) first. They'll talk with you about how you're doing and teach you how to feel better.   Then you'll see the psychiatric doctor. This doctor can help you deal with troubling thoughts and feelings by giving you medicine. They'll make sure you know the plan for your care.   CCPS usually takes 3 to 6 visits. If you need more visits, we may have you start seeing a different psychiatric doctor for ongoing care.  If you have any questions or concerns, we'll be glad to talk with you.  About visits  Be open  At your visits, please talk openly about your problems. It may feel hard, but it's the best way for us to help you.  Cancelling visits  If you can't come to your visit, please call us right away at [phone]. If you don't cancel at least 24 hours (1 full day) before your visit, that's \"late cancellation.\"  Being late to visits  Being very late is the same as not showing up. You will be a \"no show\" if:    Your appointment starts with a BHC, and you're more than 15 minutes late for a 30-minute (half hour) visit. This will also cancel your appointment with the psychiatric doctor.    Your appointment is with a psychiatric doctor only, and you're more than 15 minutes late for a 30-minute (half hour) visit.    Your appointment is with a psychiatric doctor only, and you're more than 30 minutes late for a 60-minute (full hour) visit.  If you cancel late or don't show up 2 times within 6 months, we may end your care.   Getting help between visits  If you need help between visits, you can call us Monday to Friday from 8 a.m. to 4:30 p.m. at [phone].  Emergency care  Call 911 or go to the nearest emergency department if your life or someone else's life is in danger.  Call 958 anytime to reach the national Suicide and Crisis hotline.  Medicine refills  To refill your medicine, call your pharmacy. You can also call [facility] [place] at [phone], Monday to Friday, 8 a.m. to 4:30 p.m. It can take 1 to 3 business days to get a refill.   Forms, letters, and tests  You may have papers to fill out, like FMLA, short-term disability, and workability. We can help you with these forms at your visits, but you must have an appointment. You may need more than 1 visit for this, to be in an intensive therapy program, or both.  Before we can give you medicine for ADHD, we may refer you to get tested for it or confirm it another way.  We may not be able to give you an emotional support animal letter.  We don't do mental health checks ordered by the court.   We don't do mental health testing, but we can refer you to get tested.   Thank you for choosing us for your care.  For informational purposes only. Not to replace the advice of your health care provider. Copyright   2022 [facility]. All rights reserved. [employer] [id number] - 12/22.          Administrative Billing:   Telephone call duration: 24 minutes  Total time spent, including chart review and documentation: 34 minutes    Patient Status:  The patient is being referred to long term community psychiatry care and provider will provide bridging until patient is established with new community provider.

## 2023-05-31 NOTE — PATIENT INSTRUCTIONS
Continue buspirone 30 mg twice daily.     Continue propranolol 20 mg at bedtime.     Continue lorazepam 0.5 mg daily as needed for anxiety.     Increase venlafaxine ER to 225 mg daily.     Continue individual therapy.     Continue exercise 30 min 3 times per week.     Continue all other medications per your primary care provider.    Per our conversation, you are being referred to a psychiatric provider in the community.  You should receive a call, or you may call [facility] at [phone] to schedule.    [place] Resources:    Go to the Emergency Department as needed or call after hours crisis line at [phone].    To schedule individual or family therapy, call [facility] at [phone].   Follow up with primary care provider as planned or sooner for acute medical concerns.  Call the psychiatric nurse line with medication questions or concerns at [phone].  [employer]le GeoSocial Networks[employer] may be used to communicate with your provider, but this is not intended to be used for emergencies.    Community Resources:    National Suicide Prevention Lifeline: 272.423.1682 (TTY: 601.942.4459). Call anytime for help.  (www.suicidepreventionlifeline.org)  National [place] on Mental Illness (www.rosalio.org): 630.429.9059 or 012-690-4791.   Mental Health Association (www.mentalhealth.org): 184.280.8685 or 001-407-7818.  [place] Crisis Text Line: Text [place] to 016165  Suicide LifeLine Chat: suicidepreventionlifeline.org/chat      Patient Education   Collaborative Care Psychiatry Service  What to Expect  Here's what to expect from your Collaborative Care Psychiatry Service (CCPS).   About CCPS  CCPS means 2 people work together to help you get better. You'll meet with a behavioral health clinician and a psychiatric doctor. A behavioral health clinician helps people with mental health problems by talking with them. A psychiatric doctor helps people by giving them medicine.  How it works  At every visit, you'll  "see the behavioral health clinician (BHC) first. They'll talk with you about how you're doing and teach you how to feel better.   Then you'll see the psychiatric doctor. This doctor can help you deal with troubling thoughts and feelings by giving you medicine. They'll make sure you know the plan for your care.   CCPS usually takes 3 to 6 visits. If you need more visits, we may have you start seeing a different psychiatric doctor for ongoing care.  If you have any questions or concerns, we'll be glad to talk with you.  About visits  Be open  At your visits, please talk openly about your problems. It may feel hard, but it's the best way for us to help you.  Cancelling visits  If you can't come to your visit, please call us right away at [phone]. If you don't cancel at least 24 hours (1 full day) before your visit, that's \"late cancellation.\"  Being late to visits  Being very late is the same as not showing up. You will be a \"no show\" if:  Your appointment starts with a BHC, and you're more than 15 minutes late for a 30-minute (half hour) visit. This will also cancel your appointment with the psychiatric doctor.  Your appointment is with a psychiatric doctor only, and you're more than 15 minutes late for a 30-minute (half hour) visit.  Your appointment is with a psychiatric doctor only, and you're more than 30 minutes late for a 60-minute (full hour) visit.  If you cancel late or don't show up 2 times within 6 months, we may end your care.   Getting help between visits  If you need help between visits, you can call us Monday to Friday from 8 a.m. to 4:30 p.m. at [phone].  Emergency care  Call 911 or go to the nearest emergency department if your life or someone else's life is in danger.  Call 348 anytime to reach the national Suicide and Crisis hotline.  Medicine refills  To refill your medicine, call your pharmacy. You can also call [facility]'s [facility] at [phone], Monday to " Friday, 8 a.m. to 4:30 p.m. It can take 1 to 3 business days to get a refill.   Forms, letters, and tests  You may have papers to fill out, like FMLA, short-term disability, and workability. We can help you with these forms at your visits, but you must have an appointment. You may need more than 1 visit for this, to be in an intensive therapy program, or both.  Before we can give you medicine for ADHD, we may refer you to get tested for it or confirm it another way.  We may not be able to give you an emotional support animal letter.  We don't do mental health checks ordered by the court.   We don't do mental health testing, but we can refer you to get tested.   Thank you for choosing us for your care.  For informational purposes only. Not to replace the advice of your health care provider. Copyright   2022 [facility]. All rights reserved. [employer] 967370 - 12/22.

## 2023-05-31 NOTE — NURSING NOTE
Is the patient currently in the state of [place]? YES    Visit mode:TELEPHONE    If the visit is dropped, the patient can be reconnected by: TELEPHONE VISIT: Phone number:   Telephone Information:   Mobile [phone]       Will anyone else be joining the visit? No  (If patient encounters technical issues they should call [phone])    How would you like to obtain your AVS? MyChart    Are changes needed to the allergy or medication list? NO    Rooming Documentation: Assigned questionnaire(s) completed .    Reason for visit: RECHECK     PATY Fay

## 2023-10-12 ENCOUNTER — VIRTUAL VISIT (OUTPATIENT)
Dept: FAMILY MEDICINE | Facility: CLINIC | Age: 33
End: 2023-10-12
Payer: COMMERCIAL

## 2023-10-12 DIAGNOSIS — F41.1 GENERALIZED ANXIETY DISORDER: ICD-10-CM

## 2023-10-12 DIAGNOSIS — M54.12 CERVICAL RADICULOPATHY: Primary | ICD-10-CM

## 2023-10-12 DIAGNOSIS — F33.1 MODERATE EPISODE OF RECURRENT MAJOR DEPRESSIVE DISORDER (H): ICD-10-CM

## 2023-10-12 PROCEDURE — 99214 OFFICE O/P EST MOD 30 MIN: CPT | Mod: VID | Performed by: FAMILY MEDICINE

## 2023-10-12 RX ORDER — LORAZEPAM 0.5 MG/1
0.5 TABLET ORAL
Qty: 30 TABLET | Refills: 0 | Status: SHIPPED | OUTPATIENT
Start: 2023-10-12 | End: 2024-03-25

## 2023-10-12 RX ORDER — PROPRANOLOL HYDROCHLORIDE 20 MG/1
20 TABLET ORAL DAILY
Qty: 90 TABLET | Refills: 1 | Status: SHIPPED | OUTPATIENT
Start: 2023-10-12 | End: 2024-03-25

## 2023-10-12 RX ORDER — BUSPIRONE HYDROCHLORIDE 30 MG/1
30 TABLET ORAL 2 TIMES DAILY
Qty: 180 TABLET | Refills: 1 | Status: SHIPPED | OUTPATIENT
Start: 2023-10-12 | End: 2024-07-08

## 2023-10-12 RX ORDER — VENLAFAXINE HYDROCHLORIDE 150 MG/1
150 CAPSULE, EXTENDED RELEASE ORAL DAILY
Qty: 90 CAPSULE | Refills: 1 | Status: SHIPPED | OUTPATIENT
Start: 2023-10-12 | End: 2024-06-26

## 2023-10-12 ASSESSMENT — PATIENT HEALTH QUESTIONNAIRE - PHQ9
SUM OF ALL RESPONSES TO PHQ QUESTIONS 1-9: 5
5. POOR APPETITE OR OVEREATING: SEVERAL DAYS

## 2023-10-12 ASSESSMENT — ANXIETY QUESTIONNAIRES
7. FEELING AFRAID AS IF SOMETHING AWFUL MIGHT HAPPEN: NOT AT ALL
GAD7 TOTAL SCORE: 5
5. BEING SO RESTLESS THAT IT IS HARD TO SIT STILL: SEVERAL DAYS
GAD7 TOTAL SCORE: 5
IF YOU CHECKED OFF ANY PROBLEMS ON THIS QUESTIONNAIRE, HOW DIFFICULT HAVE THESE PROBLEMS MADE IT FOR YOU TO DO YOUR WORK, TAKE CARE OF THINGS AT HOME, OR GET ALONG WITH OTHER PEOPLE: SOMEWHAT DIFFICULT
3. WORRYING TOO MUCH ABOUT DIFFERENT THINGS: SEVERAL DAYS
6. BECOMING EASILY ANNOYED OR IRRITABLE: SEVERAL DAYS
2. NOT BEING ABLE TO STOP OR CONTROL WORRYING: NOT AT ALL
1. FEELING NERVOUS, ANXIOUS, OR ON EDGE: SEVERAL DAYS

## 2023-10-12 NOTE — PROGRESS NOTES
Adam is a 33 year old who is being evaluated via a billable video visit.      How would you like to obtain your AVS? MyChart  If the video visit is dropped, the invitation should be resent by: Text to cell phone: [phone]  Will anyone else be joining your video visit? No    Assessment & Plan     Cervical radiculopathy  History and clinical examination are consistent with cervical radiculopathy.  Patient was advised to use over-the-counter medications for symptomatic relief.  Referred to physical therapy.  - Physical Therapy Referral; Future    Generalized anxiety disorder  Patient was previously seeing a psychiatrist for depression and anxiety and insomnia.  Advised him to establish care with a new psychiatrist.  - busPIRone HCl (BUSPAR) 30 MG tablet; Take 1 tablet (30 mg) by mouth 2 times daily  - propranolol (INDERAL) 20 MG tablet; Take 1 tablet (20 mg) by mouth daily  - LORazepam (ATIVAN) 0.5 MG tablet; Take 1 tablet (0.5 mg) by mouth every 3 days  - Adult Mental Health  Referral; Future    Moderate episode of recurrent major depressive disorder (H)  - venlafaxine (EFFEXOR XR) 150 MG 24 hr capsule; Take 1 capsule (150 mg) by mouth daily  - Adult Mental Health  Referral; Future      Lulu Cope MD  [facility]    Sunni Medina is a 33 year old, presenting for the following health issues:  Neck Pain    History of Present Illness       Reason for visit:  Looking for a PT referal for a sciatica flare-up in my neck and leg. As well as med refills.    He eats 0-1 servings of fruits and vegetables daily.He consumes 0 sweetened beverage(s) daily.He exercises with enough effort to increase his heart rate 60 or more minutes per day.  He exercises with enough effort to increase his heart rate 3 or less days per week.   He is taking medications regularly.  33-year-old with past medical history of anxiety, depression, sciatica, chronic insomnia.  He scheduled a virtual  visit to discuss persistent right-sided neck pain.  Pain started 3 weeks ago.    Pain was on the scale of 8/10. Improved to 2-3/10.   Denies any numbness or tingling.   Took ibuprofen for 5-7 days.     Had a similar episode in the past but it resolved quickly.   Patient has a history of sciatia.     Also, patient/medication refills.  Last visit with psychiatrist was 6 months ago.  Review of Systems   Constitutional, HEENT, cardiovascular, pulmonary, gi and gu systems are negative, except as otherwise noted.      Objective           Vitals:  No vitals were obtained today due to virtual visit.    Physical Exam   GENERAL: Healthy, alert and no distress  EYES: Eyes grossly normal to inspection.  No discharge or erythema, or obvious scleral/conjunctival abnormalities.  RESP: No audible wheeze, cough, or visible cyanosis.  No visible retractions or increased work of breathing.    SKIN: Visible skin clear. No significant rash, abnormal pigmentation or lesions.  NEURO: Cranial nerves grossly intact.  Mentation and speech appropriate for age.  PSYCH: Mentation appears normal, affect normal/bright, judgement and insight intact, normal speech and appearance well-groomed.        Video-Visit Details    Type of service:  Video Visit     Originating Location (pt. Location): Home  Distant Location (provider location):  Off-site  Platform used for Video Visit: SetMeUp

## 2023-10-20 ENCOUNTER — THERAPY VISIT (OUTPATIENT)
Dept: PHYSICAL THERAPY | Facility: CLINIC | Age: 33
End: 2023-10-20
Attending: FAMILY MEDICINE
Payer: COMMERCIAL

## 2023-10-20 DIAGNOSIS — M54.12 CERVICAL RADICULOPATHY: ICD-10-CM

## 2023-10-20 PROCEDURE — 97161 PT EVAL LOW COMPLEX 20 MIN: CPT | Mod: GP

## 2023-10-20 PROCEDURE — 97110 THERAPEUTIC EXERCISES: CPT | Mod: GP

## 2023-10-20 PROCEDURE — 97530 THERAPEUTIC ACTIVITIES: CPT | Mod: GP

## 2023-10-20 NOTE — PROGRESS NOTES
PHYSICAL THERAPY EVALUATION  Type of Visit: Evaluation    See electronic medical record for Abuse and Falls Screening details.    Started having back pain in 2018, after recovering from shoulder surgery and tweaked lower back. Has sciatica flare ups in L leg. The last year, has had trouble rotating neck and looking behind him when driving, feels like it is coming from his neck. Has been about a month since he agitated it turning neck in the shower. Pain on R side, in upper traps, reports he has a lot of upper shoulder tension.     Subjective       Presenting condition or subjective complaint: Neck pain, upper back/shoulder pain and stiffness  Date of onset: 10/12/23    Relevant medical history:     Dates & types of surgery: Right shoulder labrum repair - 8/15/18    Prior diagnostic imaging/testing results:       Prior therapy history for the same diagnosis, illness or injury: No        Living Environment  Social support: With a significant other or spouse   Type of home: House   Stairs to enter the home: Yes 4 Is there a railing: Yes   Ramp: No   Stairs inside the home: Yes 20 Is there a railing: Yes   Help at home: None  Equipment owned:       Employment: Yes   Hobbies/Interests: Basketball, hiking, canoeing, live music, softball, biking, swimming, watch tv/movies    Patient goals for therapy: Turn my neck comfortably. Feel like the smallest movement won t tweak my neck/upper back.    Pain assessment: See objective evaluation for additional pain details     Objective   CERVICAL SPINE EVALUATION  PAIN: Pain Level at Rest: 0/10  Pain Level with Use: 6/10  Pain Location: cervical spine  Pain Quality: Aching and stiffness  Pain Frequency: intermittent  Pain is Worst:    Pain is Exacerbated By: Use, turning head  Pain is Relieved By: heat and massage  Pain Progression: Improved, has gotten better in terms of pain intensity over the past month  INTEGUMENTARY (edema, incisions): WNL  POSTURE: Standing  Posture: Rounded shoulders  Sitting Posture: Rounded shoulders, stiff  GAIT:   Gait Deviations: WNL  ROM:   (Degrees) Left AROM Right AROM    Cervical Flexion WNL    Cervical Extension Limited 25%    Cervical Side bend wnl Limited 25%     Left AROM Left PROM Right AROM Right PROM   Shoulder Flexion wnl  wnl    Shoulder Extension       Shoulder Abduction wnl  wnl    Shoulder Adduction       Shoulder IR wnl  wnl    Shoulder ER wnl  wnl      MYOTOMES:    Left Right   C4 (Shrug) 5 5   C5 (Deltoid) 5 5   C6 (Biceps) 5 5   C7 (Triceps) 5 5     DTR S: WNL  CORD SIGNS: WNL  DERMATOMES: WNL  NEURAL TENSION:  R median nerve neural tension. Able to achieve full positioning, more intense on R vs L  FLEXIBILITY: WNL   SPECIAL TESTS:    Left Right   Alar Ligament    Cervical Flexion-Rotation Negative  Negative         Compression Negative  Positive   Distraction Negative  Negative    Spurling s Negative  Positive     PALPATION:  TTP R scalenes, UT, rhomboids, lower trap. Increased tension bilaterally UTs  SPINAL SEGMENTAL CONCLUSIONS:  Decreased lateral and AP mobility of C1-C7    Assessment & Plan   CLINICAL IMPRESSIONS  Medical Diagnosis: Cervical radiculopathy    Treatment Diagnosis: Neck pain   Impression/Assessment: Patient is a 33 year old male with neck complaints.  The following significant findings have been identified: Pain, Decreased ROM/flexibility, Decreased joint mobility, and Impaired muscle performance. These impairments interfere with their ability to perform self care tasks, work tasks, recreational activities, and driving  as compared to previous level of function.     Clinical Decision Making (Complexity):  Clinical Presentation: Stable/Uncomplicated  Clinical Presentation Rationale: based on medical and personal factors listed in PT evaluation  Clinical Decision Making (Complexity): Low complexity    PLAN OF CARE  Treatment Interventions:  Modalities: Cryotherapy, Dry Needling, E-stim, Hot Pack, Mechanical  Traction  Interventions: Manual Therapy, Neuromuscular Re-education, Therapeutic Activity, Therapeutic Exercise, Self-Care/Home Management    Long Term Goals     PT Goal 1  Goal Identifier: LTG  Goal Description: Patient will decrease NDI percent form 18 to less than 10% to demonstrate functional improvement in syptoms  Rationale: to maximize safety and independence within the home;to maximize safety and independence within the community;to maximize safety and independence with self cares  Target Date: 12/29/23      Frequency of Treatment: 1x every 1-2 weeks  Duration of Treatment: 10 weeks    Recommended Referrals to Other Professionals:  none  Education Assessment:   Learner/Method: Patient;Listening;Demonstration;Pictures/Video;No Barriers to Learning    Risks and benefits of evaluation/treatment have been explained.   Patient/Family/caregiver agrees with Plan of Care.     Evaluation Time:     PT Eval, Low Complexity Minutes (60916): 15     Signing Clinician: Gricel Childers PT

## 2023-11-10 ENCOUNTER — THERAPY VISIT (OUTPATIENT)
Dept: PHYSICAL THERAPY | Facility: CLINIC | Age: 33
End: 2023-11-10
Attending: FAMILY MEDICINE
Payer: COMMERCIAL

## 2023-11-10 DIAGNOSIS — M54.12 CERVICAL RADICULOPATHY: Primary | ICD-10-CM

## 2023-11-10 PROCEDURE — 97110 THERAPEUTIC EXERCISES: CPT | Mod: GP

## 2024-02-14 PROBLEM — M54.12 CERVICAL RADICULOPATHY: Status: RESOLVED | Noted: 2023-10-20 | Resolved: 2024-02-14

## 2024-02-14 NOTE — PROGRESS NOTES
"    DISCHARGE  Reason for Discharge: Patient has failed to schedule further appointments.    Discharge Plan: Patient to continue home program.    Referring Provider:  Lulu Cope       11/10/23 0500   Appointment Info   Signing clinician's name / credentials Gricel Childers DPT   Total/Authorized Visits 6   Visits Used 2   Medical Diagnosis Cervical radiculopathy   PT Tx Diagnosis Neck pain   Progress Note/Certification   Onset of illness/injury or Date of Surgery 10/12/23   Therapy Frequency 1x every 1-2 weeks   Predicted Duration 10 weeks   Progress Note Due Date 12/29/23   Progress Note Completed Date 10/20/23   PT Goal 1   Goal Identifier LTG   Goal Description Patient will decrease NDI percent form 18 to less than 10% to demonstrate functional improvement in syptoms   Rationale to maximize safety and independence within the home;to maximize safety and independence within the community;to maximize safety and independence with self cares   Target Date 12/29/23   Subjective Report   Subjective Report Reports he has been able to attempt most of the exercises. Is feeling much better, does not have symptoms. Sometimes will feel a small \"tweak\" in his neck when looking quickly to the L.   Objective Measures   Objective Measures Objective Measure 1;Objective Measure 2   Objective Measure 1   Objective Measure row   Details UT compensation, with row and straight arm row. Decreased with cueing, 1x10 with 44lbs.   Objective Measure 2   Objective Measure chin tuck   Details in supine and seated, good form, no SCM compensations noted.   Treatment Interventions (PT)   Interventions Therapeutic Procedure/Exercise;Therapeutic Activity   Therapeutic Procedure/Exercise   Therapeutic Procedures: strength, endurance, ROM, flexibillity minutes (14013) 30   Ther Proc 1 HEP   Ther Proc 1 - Details Progression of HEP, added row, straight arm row, t spine rotation.   PTRx Ther Proc 1 [place] Stretch   PTRx Ther Proc 1 - " Details reviewed   PTRx Ther Proc 2 Cervical Retraction With Patient Overpressure   PTRx Ther Proc 2 - Details reviewed   PTRx Ther Proc 3 Levator Scapulae Stretch   PTRx Ther Proc 3 - Details reviewed   PTRx Ther Proc 4 Scapular Retraction/Depression   PTRx Ther Proc 4 - Details reviewed pt with tendency to compensate with UTs   Skilled Intervention progression and modification of HEP   Patient Response/Progress no adverse resposne   PTRx Ther Proc 5 Shoulder Theraband Rows   PTRx Ther Proc 5 - Details cues for exercise set up and to decrease UT compensations.    PTRx Ther Proc 6 Arm Slides   PTRx Ther Proc 6 - Details No Notes   PTRx Ther Proc 7 Shoulder Theraband Extension   PTRx Ther Proc 7 - Details cues for exercise set up and to decrease UT compensations.    PTRx Ther Proc 8 Shoulder Scapular Retraction with Tubing   PTRx Ther Proc 8 - Details 1x10 with blue band 1x5 with green band improved ROM with green band. Good form throughout no compensations noted.    Education   Learner/Method Patient;Listening;Demonstration;Pictures/Video;No Barriers to Learning   Plan   Home program per ptrx   Updates to plan of care pt will return in ~1 month   Plan for next session review HEP   Total Session Time   Timed Code Treatment Minutes 30   Total Treatment Time (sum of timed and untimed services) 30

## 2024-03-25 ENCOUNTER — PATIENT OUTREACH (OUTPATIENT)
Dept: CARE COORDINATION | Facility: CLINIC | Age: 34
End: 2024-03-25
Payer: COMMERCIAL

## 2024-03-25 ENCOUNTER — MYC REFILL (OUTPATIENT)
Dept: FAMILY MEDICINE | Facility: CLINIC | Age: 34
End: 2024-03-25
Payer: COMMERCIAL

## 2024-03-25 DIAGNOSIS — F41.1 GENERALIZED ANXIETY DISORDER: ICD-10-CM

## 2024-03-25 RX ORDER — LORAZEPAM 0.5 MG/1
0.5 TABLET ORAL
Qty: 30 TABLET | Refills: 0 | Status: SHIPPED | OUTPATIENT
Start: 2024-03-25 | End: 2024-06-17

## 2024-03-25 RX ORDER — PROPRANOLOL HYDROCHLORIDE 20 MG/1
20 TABLET ORAL DAILY
Qty: 90 TABLET | Refills: 0 | Status: SHIPPED | OUTPATIENT
Start: 2024-03-25 | End: 2024-06-20

## 2024-04-08 ENCOUNTER — PATIENT OUTREACH (OUTPATIENT)
Dept: CARE COORDINATION | Facility: CLINIC | Age: 34
End: 2024-04-08
Payer: COMMERCIAL

## 2024-05-09 SDOH — HEALTH STABILITY: PHYSICAL HEALTH: ON AVERAGE, HOW MANY DAYS PER WEEK DO YOU ENGAGE IN MODERATE TO STRENUOUS EXERCISE (LIKE A BRISK WALK)?: 2 DAYS

## 2024-05-09 SDOH — HEALTH STABILITY: PHYSICAL HEALTH: ON AVERAGE, HOW MANY MINUTES DO YOU ENGAGE IN EXERCISE AT THIS LEVEL?: 60 MIN

## 2024-05-09 ASSESSMENT — PATIENT HEALTH QUESTIONNAIRE - PHQ9
SUM OF ALL RESPONSES TO PHQ QUESTIONS 1-9: 8
SUM OF ALL RESPONSES TO PHQ QUESTIONS 1-9: 8
10. IF YOU CHECKED OFF ANY PROBLEMS, HOW DIFFICULT HAVE THESE PROBLEMS MADE IT FOR YOU TO DO YOUR WORK, TAKE CARE OF THINGS AT HOME, OR GET ALONG WITH OTHER PEOPLE: VERY DIFFICULT

## 2024-05-09 ASSESSMENT — SOCIAL DETERMINANTS OF HEALTH (SDOH): HOW OFTEN DO YOU GET TOGETHER WITH FRIENDS OR RELATIVES?: TWICE A WEEK

## 2024-05-09 NOTE — COMMUNITY RESOURCES LIST (ENGLISH)
May 9, 2024           YOUR PERSONALIZED LIST OF SERVICES & PROGRAMS           & SHELTER    Housing      [facility] - Hotline - Housing crisis  [address] [place] [address] (Distance: 1.9 miles)  Phone: [phone]) [phone]  Website: http://www.saintolaf.org/  Language: English  Fee: Free  Accessibility: Ada accessible      [facility] - Adult halfway [facility]  [address] [place] [address] (Distance: 1.9 miles)  Phone: [phone]  Website: http://www.saintolaf.org/  Language: English  Fee: Free  Accessibility: [place] accessible      Home Health Care [facility] - [facility]  Phone: [phone]  Website: https://www.TwentyFeet/  Language: English, Hmong, Oromo, Chilean, Iranian  Hours: Mon 9:00 AM - 5:00 PM Tue 9:00 AM - 5:00 PM Wed 9:00 AM - 5:00 PM Thu 9:00 AM - 5:00 PM Fri 9:00 AM - 5:00 PM  Fee: Insurance  Accessibility: Blind accommodation, Deaf or hard of hearing, Translation services  Transportation Options: Free transportation    Case Management      [facility] With Services Independent  [address] [place] [address] (Distance: 4.4 miles)  Phone: [phone]  Website: https://[email or web address] [email or web address]/contact  Language: English, Iranian  Accessibility: [place] accessible, Blind accommodation, Deaf or hard of hearing, Translation services      Living - Housing Support-[facility] (HWS-I)  [address] [place] [address] (Distance: 1.0 miles)  Phone: [phone]  Website: https://www.Larger Than Life Prints  Language: Sinhala, English, Chilean  Fee: Insurance      [employer] - Housing Stabilization Services  Phone: [phone]  Website: https://homebasemn.com/  Language: English  Hours: Mon 8:00 AM - 4:00 PM Tue 8:00 AM - 4:00 PM Wed 8:00 AM - 4:00 PM Thu 8:00 AM - 4:00 PM Fri 8:00 AM - 4:00 PM  Fee: Free  Accessibility: Blind accommodation, Deaf or hard of hearing  Transportation Options:  Free transportation    Drop-In Services      [facility] - Drop-in center or day shelter  [address] [place] [address] (Distance: 2.5 miles)  Phone: [phone]  Language: English  Fee: Free  Accessibility: [place] accessible, Translation services      [facility] - Drop-in center or day shelter  [address] [place] [address] (Distance: 2.8 miles)  Phone: [phone]  Language: English  Fee: Free      [facility] LAUNDRY LOVE  Website: http://www.laundrylove.org               IMPORTANT NUMBERS & WEBSITES        Emergency Services  911  .   [employer]  211 http://211unitedway.org  .   Poison Control  (525) 208-2241 http://[place]poison.org http://wis[place]poison.org  .     Suicide and Crisis Lifeline  988 http://988Synapticonline.org  .   [facility]help [place] Child Abuse Hotline  301.569.4239 http://Childhelphotline.org   .   [place] Sexual Assault Hotline  (347) 172-3480 (HOPE) http://From The Benchn.org   .     [place] Runaway Safeline  (257) 436-2149 (RUNAWAY) http://GetSetruTelller.org  .   Pregnancy & Postpartum Support  Call/text 818-550-5233  [place]: http://ppsupport[place].org  [place]: http://FlightOffice.com/wi  .   Substance Abuse National Helpline ([facility])  697-213-HELP (3759) http://Findtreatment.gov   .                DISCLAIMER: These resources have been generated via the OtherInbox Platform. OtherInbox does not endorse any service providers mentioned in this resource list. OtherInbox does not guarantee that the services mentioned in this resource list will be available to you or will improve your health or wellness.    [facility]

## 2024-05-10 ENCOUNTER — OFFICE VISIT (OUTPATIENT)
Dept: FAMILY MEDICINE | Facility: CLINIC | Age: 34
End: 2024-05-10
Payer: COMMERCIAL

## 2024-05-10 VITALS
TEMPERATURE: 97 F | OXYGEN SATURATION: 100 % | SYSTOLIC BLOOD PRESSURE: 134 MMHG | WEIGHT: 228 LBS | HEIGHT: 73 IN | BODY MASS INDEX: 30.22 KG/M2 | DIASTOLIC BLOOD PRESSURE: 91 MMHG | RESPIRATION RATE: 16 BRPM | HEART RATE: 78 BPM

## 2024-05-10 DIAGNOSIS — Z00.00 ENCOUNTER FOR PREVENTATIVE ADULT HEALTH CARE EXAMINATION: Primary | ICD-10-CM

## 2024-05-10 DIAGNOSIS — Z77.018 EXPOSURE TO HEAVY METALS: ICD-10-CM

## 2024-05-10 DIAGNOSIS — J34.0 ULCER OF MUCOSA OF NOSE: ICD-10-CM

## 2024-05-10 DIAGNOSIS — F33.1 MODERATE EPISODE OF RECURRENT MAJOR DEPRESSIVE DISORDER (H): ICD-10-CM

## 2024-05-10 DIAGNOSIS — J40 BRONCHITIS: ICD-10-CM

## 2024-05-10 LAB
ERYTHROCYTE [DISTWIDTH] IN BLOOD BY AUTOMATED COUNT: 12.2 % (ref 10–15)
HBA1C MFR BLD: 5.1 % (ref 0–5.6)
HCT VFR BLD AUTO: 44.1 % (ref 40–53)
HGB BLD-MCNC: 15.3 G/DL (ref 13.3–17.7)
MCH RBC QN AUTO: 32.8 PG (ref 26.5–33)
MCHC RBC AUTO-ENTMCNC: 34.7 G/DL (ref 31.5–36.5)
MCV RBC AUTO: 94 FL (ref 78–100)
PLATELET # BLD AUTO: 250 10E3/UL (ref 150–450)
RBC # BLD AUTO: 4.67 10E6/UL (ref 4.4–5.9)
WBC # BLD AUTO: 4.6 10E3/UL (ref 4–11)

## 2024-05-10 PROCEDURE — 82728 ASSAY OF FERRITIN: CPT | Performed by: FAMILY MEDICINE

## 2024-05-10 PROCEDURE — 83885 ASSAY OF NICKEL: CPT | Mod: 90 | Performed by: FAMILY MEDICINE

## 2024-05-10 PROCEDURE — 99000 SPECIMEN HANDLING OFFICE-LAB: CPT | Performed by: FAMILY MEDICINE

## 2024-05-10 PROCEDURE — 83655 ASSAY OF LEAD: CPT | Mod: 90 | Performed by: FAMILY MEDICINE

## 2024-05-10 PROCEDURE — 80061 LIPID PANEL: CPT | Performed by: FAMILY MEDICINE

## 2024-05-10 PROCEDURE — 99213 OFFICE O/P EST LOW 20 MIN: CPT | Mod: 25 | Performed by: FAMILY MEDICINE

## 2024-05-10 PROCEDURE — 83825 ASSAY OF MERCURY: CPT | Mod: 90 | Performed by: FAMILY MEDICINE

## 2024-05-10 PROCEDURE — 85027 COMPLETE CBC AUTOMATED: CPT | Performed by: FAMILY MEDICINE

## 2024-05-10 PROCEDURE — 82175 ASSAY OF ARSENIC: CPT | Mod: 90 | Performed by: FAMILY MEDICINE

## 2024-05-10 PROCEDURE — 99395 PREV VISIT EST AGE 18-39: CPT | Performed by: FAMILY MEDICINE

## 2024-05-10 PROCEDURE — 36415 COLL VENOUS BLD VENIPUNCTURE: CPT | Performed by: FAMILY MEDICINE

## 2024-05-10 PROCEDURE — 80053 COMPREHEN METABOLIC PANEL: CPT | Performed by: FAMILY MEDICINE

## 2024-05-10 PROCEDURE — 82300 ASSAY OF CADMIUM: CPT | Mod: 90 | Performed by: FAMILY MEDICINE

## 2024-05-10 PROCEDURE — 83036 HEMOGLOBIN GLYCOSYLATED A1C: CPT | Performed by: FAMILY MEDICINE

## 2024-05-10 RX ORDER — AZITHROMYCIN 250 MG/1
TABLET, FILM COATED ORAL
Qty: 6 TABLET | Refills: 0 | Status: SHIPPED | OUTPATIENT
Start: 2024-05-10 | End: 2024-05-15

## 2024-05-10 RX ORDER — ALBUTEROL SULFATE 90 UG/1
1-2 AEROSOL, METERED RESPIRATORY (INHALATION)
COMMUNITY
Start: 2024-05-01

## 2024-05-10 ASSESSMENT — PAIN SCALES - GENERAL: PAINLEVEL: NO PAIN (0)

## 2024-05-10 NOTE — PROGRESS NOTES
"Preventive Care Visit  [facility]  Lulu Cope MD, Family Medicine  May 10, 2024      Assessment & Plan     Adam was seen today for physical and cough.    Diagnoses and all orders for this visit:    Encounter for preventative adult health care examination  -     Lipid panel reflex to direct LDL Fasting; Future  -     Hemoglobin A1c; Future  -     CBC with platelets; Future  -     Comprehensive metabolic panel (BMP + Alb, Alk Phos, ALT, AST, Total. Bili, TP); Future    Exposure to heavy metals  -     Ferritin; Future  -     Nickel Serum; Future  -     Lead Venous Blood Confirm; Future  -     Arsenic level; Future  -     Cadmium level; Future  -     Mercury, blood; Future    Bronchitis  -     azithromycin (ZITHROMAX) 250 MG tablet; Take 2 tablets (500 mg) by mouth daily for 1 day, THEN 1 tablet (250 mg) daily for 4 days.    Ulcer of mucosa of nose  -     Adult Dermatology  Referral; Future    Moderate episode of recurrent major depressive disorder (H)  Patient is currently taking BuSpar 30 mg 2 times daily.  He is also taking Effexor 150 mg once daily and propranolol 20 mg daily.    Other orders  -     REVIEW OF HEALTH MAINTENANCE PROTOCOL ORDERS      BMI  Estimated body mass index is 30.5 kg/m  as calculated from the following:    Height as of this encounter: 1.842 m (6' 0.5\").    Weight as of this encounter: 103.4 kg (228 lb).       Counseling  Appropriate preventive services were discussed with this patient, including applicable screening as appropriate for fall prevention, nutrition, physical activity, Tobacco-use cessation, weight loss and cognition.  Checklist reviewing preventive services available has been given to the patient.  Reviewed patient's diet, addressing concerns and/or questions.   He is at risk for lack of exercise and has been provided with information to increase physical activity for the benefit of his well-being.   The patient reports drinking more than one " alcoholic drink per day and sometimes engages in binge or excessive drinking. The patient was counseled and given information about possible harmful effects of excessive alcohol intake as well as where to get help for alcohol problems. The patient's PHQ-9 score is consistent with mild depression. He was provided with information regarding depression.     Sunni Medina is a 34 year old, presenting for the following:  Physical and Cough (For 3 weeks)       Health Care Directive  Patient does not have a Health Care Directive or Living Will: Discussed advance care planning with patient; information given to patient to review.    HPI  Shortness of breath, wheezing, fatigue and cough.   symptoms started 04/21/2024 and persisted until today.   Symptoms are mostly the same. Minimal improvement with the inhalers.         5/9/2024   General Health   How would you rate your overall physical health? (!) FAIR   Feel stress (tense, anxious, or unable to sleep) To some extent   (!) STRESS CONCERN      5/9/2024   Nutrition   Three or more servings of calcium each day? (!) NO   Diet: Regular (no restrictions)   How many servings of fruit and vegetables per day? (!) 2-3   How many sweetened beverages each day? 0-1         5/9/2024   Exercise   Days per week of moderate/strenous exercise 2 days   Average minutes spent exercising at this level 60 min   (!) EXERCISE CONCERN      5/9/2024   Social Factors   Frequency of gathering with friends or relatives Twice a week   Worry food won't last until get money to buy more No   Food not last or not have enough money for food? No   Do you have housing?  No   Are you worried about losing your housing? No   Lack of transportation? No   Unable to get utilities (heat,electricity)? No   Want help with housing or utility concern? No   (!) HOUSING CONCERN PRESENT      5/9/2024   Dental   Dentist two times every year? Yes         5/9/2024   TB Screening   Were you born outside of the [place]? No        Today's PHQ-9 Score:       5/9/2024     1:39 PM   PHQ-9 SCORE   PHQ-9 Total Score MyChart 8 (Mild depression)   PHQ-9 Total Score 8         5/9/2024   Substance Use   Alcohol more than 3/day or more than 7/wk Yes   How often do you have a drink containing alcohol 2 to 3 times a week   How many alcohol drinks on typical day 3 or 4   How often do you have 5+ drinks at one occasion Less than monthly   Audit 2/3 Score 2   How often not able to stop drinking once started Never   How often failed to do what normally expected Never   How often needed first drink in am after a heavy drinking session Never   How often feeling of guilt or remorse after drinking Never   How often unable to remember what happened the night before Never   Have you or someone else been injured because of your drinking Yes, but not in the last year   Has anyone been concerned or suggested you cut down on drinking Yes, but not in the last year   TOTAL SCORE - AUDIT 9   Do you use any other substances recreationally? (!) ALCOHOL    (!) YULISATOHANNAH   Counseled regarding alcohol use.    Social History     Tobacco Use    Smoking status: Never    Smokeless tobacco: Never   Substance Use Topics    Alcohol use: Yes     Alcohol/week: 10.0 standard drinks of alcohol     Types: 10 Standard drinks or equivalent per week     Comment: 2- 5 drinks per occasion    Drug use: Never     Types: Psilocybin, Marijuana     Comment: Mushrooms in the past, marijuana occasionally         5/9/2024   STI Screening   New sexual partner(s) since last STI/HIV test? No         5/9/2024   Contraception/Family Planning   Questions about contraception or family planning No      Reviewed and updated as needed this visit by Provider   Tobacco  Allergies  Meds  Problems  Med Hx  Surg Hx  Fam Hx            Review of Systems  Constitutional, neuro, ENT, endocrine, pulmonary, cardiac, gastrointestinal, genitourinary, musculoskeletal, integument and psychiatric systems are  "negative, except as otherwise noted.     Objective    Exam  BP (!) 134/91   Pulse 78   Temp 97  F (36.1  C) (Temporal)   Resp 16   Ht 1.842 m (6' 0.5\")   Wt 103.4 kg (228 lb)   SpO2 100%   BMI 30.50 kg/m     Estimated body mass index is 30.5 kg/m  as calculated from the following:    Height as of this encounter: 1.842 m (6' 0.5\").    Weight as of this encounter: 103.4 kg (228 lb).    Physical Exam  GENERAL: alert and no distress  EYES: Eyes grossly normal to inspection, PERRL and conjunctivae and sclerae normal  HENT: ear canals and TM's normal, nose and mouth without ulcers or lesions  NECK: no adenopathy, no asymmetry, masses, or scars  RESP: lungs clear to auscultation - no rales, rhonchi or wheezes  CV: regular rate and rhythm, normal S1 S2, no S3 or S4, no murmur, click or rub, no peripheral edema  ABDOMEN: soft, nontender, no hepatosplenomegaly, no masses and bowel sounds normal  MS: no gross musculoskeletal defects noted, no edema  SKIN: no suspicious lesions or rashes  NEURO: Normal strength and tone, mentation intact and speech normal  PSYCH: mentation appears normal, affect normal/bright    Signed Electronically by: Lulu Cope MD    "

## 2024-05-11 LAB
ALBUMIN SERPL BCG-MCNC: 4.5 G/DL (ref 3.5–5.2)
ALP SERPL-CCNC: 86 U/L (ref 40–150)
ALT SERPL W P-5'-P-CCNC: 43 U/L (ref 0–70)
ANION GAP SERPL CALCULATED.3IONS-SCNC: 15 MMOL/L (ref 7–15)
ARSENIC BLD-MCNC: <10 UG/L
AST SERPL W P-5'-P-CCNC: 47 U/L (ref 0–45)
BILIRUB SERPL-MCNC: 0.7 MG/DL
BUN SERPL-MCNC: 9 MG/DL (ref 6–20)
CADMIUM BLD-MCNC: <1 UG/L
CALCIUM SERPL-MCNC: 9.7 MG/DL (ref 8.6–10)
CHLORIDE SERPL-SCNC: 104 MMOL/L (ref 98–107)
CHOLEST SERPL-MCNC: 179 MG/DL
CREAT SERPL-MCNC: 0.89 MG/DL (ref 0.67–1.17)
DEPRECATED HCO3 PLAS-SCNC: 21 MMOL/L (ref 22–29)
EGFRCR SERPLBLD CKD-EPI 2021: >90 ML/MIN/1.73M2
FASTING STATUS PATIENT QL REPORTED: ABNORMAL
FASTING STATUS PATIENT QL REPORTED: ABNORMAL
FERRITIN SERPL-MCNC: 205 NG/ML (ref 31–409)
GLUCOSE SERPL-MCNC: 82 MG/DL (ref 70–99)
HDLC SERPL-MCNC: 53 MG/DL
LDLC SERPL CALC-MCNC: 112 MG/DL
LEAD BLDV-MCNC: 2.1 UG/DL
MERCURY BLD-MCNC: <2.5 UG/L
NONHDLC SERPL-MCNC: 126 MG/DL
POTASSIUM SERPL-SCNC: 4.8 MMOL/L (ref 3.4–5.3)
PROT SERPL-MCNC: 7.4 G/DL (ref 6.4–8.3)
SODIUM SERPL-SCNC: 140 MMOL/L (ref 135–145)
TRIGL SERPL-MCNC: 72 MG/DL

## 2024-05-13 ENCOUNTER — TELEPHONE (OUTPATIENT)
Dept: FAMILY MEDICINE | Facility: CLINIC | Age: 34
End: 2024-05-13
Payer: COMMERCIAL

## 2024-05-13 DIAGNOSIS — F33.1 MODERATE EPISODE OF RECURRENT MAJOR DEPRESSIVE DISORDER (H): ICD-10-CM

## 2024-05-13 LAB — NICKEL SERPL-MCNC: <2 UG/L

## 2024-05-13 RX ORDER — CYPROHEPTADINE HYDROCHLORIDE 4 MG/1
TABLET ORAL
Qty: 30 TABLET | Refills: 5 | OUTPATIENT
Start: 2024-05-13

## 2024-05-13 NOTE — RESULT ENCOUNTER NOTE
"Dear Adam,     All your results are within normal limits except for mild increase in your LDL cholesterol \"bad cholesterol \". Monitor your dietary intake of simple carbohydrates and fats. We will re-check your cholesterol with your next physical.     One of your liver enzymes was mildly elevated at 47.  I would recommend avoiding Tylenol and alcohol completely.   Schedule a lab only visit to repeat your liver enzymes in 3 weeks.    Best Regards  Lulu Cope MD"

## 2024-05-13 NOTE — TELEPHONE ENCOUNTER
Triage,   Spoke w/ patient.  States he doesn't take this medication.   Patient did not request refill.   Could we remove medication from his chart?  Thanks!  Nandini RECIO

## 2024-05-13 NOTE — TELEPHONE ENCOUNTER
TC: please call    Prescription request refused. Medication has multiple side-effects including weight gain. I would recommend switching to a different medication.   MD Anatoliy

## 2024-06-03 ENCOUNTER — TELEPHONE (OUTPATIENT)
Dept: DERMATOLOGY | Facility: CLINIC | Age: 34
End: 2024-06-03
Payer: COMMERCIAL

## 2024-06-03 DIAGNOSIS — J34.0 ULCER OF MUCOSA OF NOSE: Primary | ICD-10-CM

## 2024-06-03 NOTE — TELEPHONE ENCOUNTER
This encounter is being sent to inform the clinic that this patient has a referral from Lulu Cope MD in [facility] for the diagnoses of Ulcer of mucosa of nose; Skin Lesion; Ulcer of mucosa of nose- not healing and bleeds frequently and has requested that this patient be seen within Priority: 1-2 Weeks and/or with Priority: 1-2 Weeks. Based on the availability of our provider(s), we are unable to accommodate this request.    Were all sites offered this patient?  Yes  Pt requesting [facility] location please  Does scheduling algorithm request to schedule next available?  Patient has been scheduled for the first available opening with Jessica Pete PA-C at [facility] on 12/27/2024.  We have informed the patient that the clinic will review their referral and reach out if a sooner appointment is medically necessary.        Referral Order in Epic  Records in [facility]  Unknown outside Recs    Please review and call Pt if you can move up his Appt to anything sooner at all - Thank you!

## 2024-06-03 NOTE — TELEPHONE ENCOUNTER
I called and left Adam a VM asking him to give us a call back to discuss an appointment.    Greer Fernandez, LATA - Dermatology

## 2024-06-13 NOTE — TELEPHONE ENCOUNTER
CANDIMTDEL to schedule appointment, clinic number provided to call back.  Was going to offer a KEVIN spot check with Dr. Dutta.     ALICIA Uribe

## 2024-06-17 ENCOUNTER — MYC REFILL (OUTPATIENT)
Dept: FAMILY MEDICINE | Facility: CLINIC | Age: 34
End: 2024-06-17
Payer: COMMERCIAL

## 2024-06-17 DIAGNOSIS — F41.1 GENERALIZED ANXIETY DISORDER: ICD-10-CM

## 2024-06-18 RX ORDER — LORAZEPAM 0.5 MG/1
0.5 TABLET ORAL
Qty: 30 TABLET | Refills: 0 | Status: SHIPPED | OUTPATIENT
Start: 2024-06-18 | End: 2024-09-11

## 2024-06-20 ENCOUNTER — MYC REFILL (OUTPATIENT)
Dept: FAMILY MEDICINE | Facility: CLINIC | Age: 34
End: 2024-06-20
Payer: COMMERCIAL

## 2024-06-20 DIAGNOSIS — F41.1 GENERALIZED ANXIETY DISORDER: ICD-10-CM

## 2024-06-20 RX ORDER — PROPRANOLOL HYDROCHLORIDE 20 MG/1
20 TABLET ORAL DAILY
Qty: 90 TABLET | Refills: 0 | Status: SHIPPED | OUTPATIENT
Start: 2024-06-20 | End: 2024-09-16

## 2024-07-03 ENCOUNTER — MYC REFILL (OUTPATIENT)
Dept: FAMILY MEDICINE | Facility: CLINIC | Age: 34
End: 2024-07-03
Payer: COMMERCIAL

## 2024-07-03 DIAGNOSIS — R11.0 NAUSEA: ICD-10-CM

## 2024-07-03 RX ORDER — ONDANSETRON 4 MG/1
4 TABLET, ORALLY DISINTEGRATING ORAL EVERY 8 HOURS PRN
Qty: 16 TABLET | Refills: 2 | Status: SHIPPED | OUTPATIENT
Start: 2024-07-03

## 2024-07-08 DIAGNOSIS — F41.1 GENERALIZED ANXIETY DISORDER: ICD-10-CM

## 2024-07-08 RX ORDER — BUSPIRONE HYDROCHLORIDE 30 MG/1
30 TABLET ORAL 2 TIMES DAILY
Qty: 180 TABLET | Refills: 1 | Status: SHIPPED | OUTPATIENT
Start: 2024-07-08

## 2024-09-11 ENCOUNTER — MYC REFILL (OUTPATIENT)
Dept: FAMILY MEDICINE | Facility: CLINIC | Age: 34
End: 2024-09-11
Payer: COMMERCIAL

## 2024-09-11 DIAGNOSIS — F41.1 GENERALIZED ANXIETY DISORDER: ICD-10-CM

## 2024-09-12 RX ORDER — LORAZEPAM 0.5 MG/1
0.5 TABLET ORAL
Qty: 30 TABLET | Refills: 0 | Status: SHIPPED | OUTPATIENT
Start: 2024-09-12

## 2024-09-15 DIAGNOSIS — F41.1 GENERALIZED ANXIETY DISORDER: ICD-10-CM

## 2024-09-16 RX ORDER — PROPRANOLOL HYDROCHLORIDE 20 MG/1
20 TABLET ORAL DAILY
Qty: 90 TABLET | Refills: 1 | Status: SHIPPED | OUTPATIENT
Start: 2024-09-16

## 2024-10-17 ENCOUNTER — TELEPHONE (OUTPATIENT)
Dept: DERMATOLOGY | Facility: CLINIC | Age: 34
End: 2024-10-17
Payer: COMMERCIAL

## 2024-10-17 NOTE — TELEPHONE ENCOUNTER
"Jessica Ortega is unfortunately unavailable on 12/27/2024. Your appointment will need to be rescheduled. We apologize for the inconvenience.    Please call [phone] to schedule the visit(s).      (If you prefer, some clinics allow you to schedule at [email or web address]. Click the \"Visits\" tab, then choose \"Schedule an Appointment.\")        Sincerely, [facility]    "

## 2024-11-19 ENCOUNTER — TELEPHONE (OUTPATIENT)
Dept: DERMATOLOGY | Facility: CLINIC | Age: 34
End: 2024-11-19
Payer: COMMERCIAL

## 2024-11-19 NOTE — TELEPHONE ENCOUNTER
Left Voicemail (2nd Attempt) for the patient to call back and reschedule the following:    Appointment type: New Dermatology   Provider: General Dermatology provider   Return date: First available   Specialty phone number: [phone]  Additional appointment(s) needed: n/a  Additonal Notes: Reschedule 12/27 appt - provider not in clinic     2nd attempt made       Gin Mcdonnell on 11/19/2024 at 11:19 AM

## 2024-11-26 DIAGNOSIS — F41.1 GENERALIZED ANXIETY DISORDER: ICD-10-CM

## 2024-11-26 RX ORDER — LORAZEPAM 0.5 MG/1
0.5 TABLET ORAL
Qty: 30 TABLET | Refills: 0 | OUTPATIENT
Start: 2024-11-26

## 2024-11-26 RX ORDER — BUSPIRONE HYDROCHLORIDE 30 MG/1
30 TABLET ORAL 2 TIMES DAILY
Qty: 180 TABLET | Refills: 1 | Status: SHIPPED | OUTPATIENT
Start: 2024-11-26

## 2024-12-03 ENCOUNTER — TELEPHONE (OUTPATIENT)
Dept: DERMATOLOGY | Facility: CLINIC | Age: 34
End: 2024-12-03
Payer: COMMERCIAL

## 2024-12-03 ENCOUNTER — MYC REFILL (OUTPATIENT)
Dept: FAMILY MEDICINE | Facility: CLINIC | Age: 34
End: 2024-12-03
Payer: COMMERCIAL

## 2024-12-03 DIAGNOSIS — F41.1 GENERALIZED ANXIETY DISORDER: ICD-10-CM

## 2024-12-03 RX ORDER — LORAZEPAM 0.5 MG/1
0.5 TABLET ORAL EVERY 6 HOURS PRN
Qty: 10 TABLET | Refills: 0 | Status: CANCELLED | OUTPATIENT
Start: 2024-12-03

## 2024-12-03 NOTE — TELEPHONE ENCOUNTER
This encounter is being sent to inform the clinic that this patient has a referral from Lulu Cope MD   for the diagnoses of Ulcer of mucosa of nose [J34.0], skin lesion and has requested that this patient be seen within Priority: 1-2 Weeks. .  Based on the availability of our provider(s), we are unable to accommodate this request.    Were all sites offered this patient?  Yes    Does scheduling algorithm request to schedule next available?  Patient has been scheduled for the first available opening with Mayela Rick PA-C at  on 4/17/25.  We have informed the patient that the clinic will review their referral and reach out if a sooner appointment is medically necessary.        Pt is scheduled at  Derm because they had a sooner first available but he would like a sooner visit at the [facility].

## 2024-12-04 NOTE — TELEPHONE ENCOUNTER
Message sent to patient previously and read by patient.     __________________________    Dear James  I received your message. Ativan is a controlled substance and it is highly addictive.  Unfortunately I will not be able to authorize a 90-day supply nor a 60-day supply. I sent 10 tablets to your pharmacy to help you with your current episode of anxiety.      If you need Ativan more than 2 times per week, I would recommend scheduling a virtual visit to discuss other safer options to manage anxiety other than Ativan. Benzodiazepines are not a long term solution to anxiety.      I will be happy to discuss your other options this week. We can offer you an appointment today or Wednesday.      Let me know what works for you.      Best regards   Lulu Cope MD

## 2024-12-23 ENCOUNTER — OFFICE VISIT (OUTPATIENT)
Dept: DERMATOLOGY | Facility: CLINIC | Age: 34
End: 2024-12-23
Attending: FAMILY MEDICINE
Payer: COMMERCIAL

## 2024-12-23 DIAGNOSIS — J33.9 NASAL POLYP: ICD-10-CM

## 2024-12-23 DIAGNOSIS — J34.0 ULCER OF MUCOSA OF NOSE: Primary | ICD-10-CM

## 2024-12-23 ASSESSMENT — PAIN SCALES - GENERAL: PAINLEVEL_OUTOF10: NO PAIN (0)

## 2024-12-23 NOTE — NURSING NOTE
Dermatology Rooming Note    Adam Fish's goals for this visit include:   Chief Complaint   Patient presents with    Derm Problem     Lesion in the right nostril. Its been around a decade.      Jesse Saleh, EMT  Clinic Support  [facility]     [phone]    Employed by [employer]

## 2024-12-23 NOTE — PROGRESS NOTES
[facility] Dermatology Note  Encounter Date: Dec 23, 2024  Office Visit     Dermatology Problem List:  1. Nasal ulceration, referral to ENT    ____________________________________________    Assessment & Plan:     # Nasal ulceration  # Squamous metaplasia, chronic and worsening  - Uncertain risk of squamous transformation  - Bleeding, worsening.   - Unable to examine location fully despite efforts  - ENT referral for eval and treatment given location; they can likely use scope to evaluate      Procedures Performed:   None    Follow-up: As needed.     Staff:     Airam Koroma MD PhD  Dermatology, Dermatopathology    ____________________________________________    CC: Derm Problem (Lesion in the right nostril. Its been around a decade. )      HPI:  Adam Fish is a(n) 34 year old female who presents today as a new patient for evaluation of nasal lesion. Referral placed for ulcer of the mucosa of the nose, nonhealing. Referred from family practice.    Previously saw ENT when younger. Is bleeding now, bothersome and papule which is worsening in size and symptoms.     Patient is otherwise feeling well, without additional skin concerns.     Labs Reviewed:  N/A    Physical Exam:  Vitals: There were no vitals taken for this visit.  SKIN: Focused examination of Face and nose was performed.  - Right mucosal nare surface with pink erosion and black/heme crusted papule  - Attempted photos, difficult location.        - No other lesions of concern on areas examined.       Medications:  Current Outpatient Medications   Medication Sig Dispense Refill    albuterol (PROAIR HFA/PROVENTIL HFA/VENTOLIN HFA) 108 (90 Base) MCG/ACT inhaler Inhale 1-2 puffs into the lungs      busPIRone HCl (BUSPAR) 30 MG tablet TAKE 1 TABLET BY MOUTH 2 TIMES DAILY. 180 tablet 1    LORazepam (ATIVAN) 0.5 MG tablet Take 1 tablet (0.5 mg) by mouth every 6 hours as needed for anxiety. 10 tablet 0    LORazepam (ATIVAN) 0.5 MG tablet  Take 1 tablet (0.5 mg) by mouth every 3 days. 30 tablet 0    ondansetron (ZOFRAN ODT) 4 MG ODT tab Take 1 tablet (4 mg) by mouth every 8 hours as needed for nausea 16 tablet 2    propranolol (INDERAL) 20 MG tablet TAKE 1 TABLET BY MOUTH EVERY DAY 90 tablet 1    venlafaxine (EFFEXOR XR) 150 MG 24 hr capsule Take 1 capsule (150 mg) by mouth daily 90 capsule 1     No current facility-administered medications for this visit.      Past Medical History:   Patient Active Problem List   Diagnosis    CARDIOVASCULAR SCREENING; LDL GOAL LESS THAN 160    Depression with anxiety    Elevated BP without diagnosis of hypertension    Chronic right-sided low back pain with right-sided sciatica    Dysthymia    Kyphosis (acquired) (postural)    Poor posture    Thoracic segment dysfunction    Sacral pain    Somatic dysfunction of sacral region    Somatic dysfunction of lumbar region    Paresthesias    Generalized anxiety disorder    Alcohol abuse    Social anxiety disorder    PTSD (post-traumatic stress disorder)    Major depression, single episode    Moderate episode of recurrent major depressive disorder (H)       Past Medical History:   Diagnosis Date    Depressive disorder 2/1/2015    Hypertension 2015       CC: Primary Care Provider: Lluu Cope

## 2025-01-23 NOTE — TELEPHONE ENCOUNTER
FUTURE VISIT INFORMATION      FUTURE VISIT INFORMATION:  Date: 3/18/25  Time: 2:40PM  Location: [facility]  REFERRAL INFORMATION:  Referring provider:  Airam Martinez MD   Referring providers clinic:  [facility]  Reason for visit/diagnosis  New per pt-ref, Diagnosis: Ulcer of mucosa of nose, Nasal polyp, r4ef'd by AIRAM MARTINEZ Enc Provider: Airam Martinez MD, ref-recs in epic, confirmed [facility]     RECORDS REQUESTED FROM:       Clinic name Comments Records Status Imaging Status   [facility] 12/23/24- OV Airam Zamora MD  EPIC

## 2025-01-27 ENCOUNTER — MYC REFILL (OUTPATIENT)
Dept: FAMILY MEDICINE | Facility: CLINIC | Age: 35
End: 2025-01-27
Payer: COMMERCIAL

## 2025-01-27 DIAGNOSIS — F41.1 GENERALIZED ANXIETY DISORDER: ICD-10-CM

## 2025-01-27 RX ORDER — LORAZEPAM 0.5 MG/1
0.5 TABLET ORAL EVERY 6 HOURS PRN
Qty: 10 TABLET | Refills: 0 | Status: SHIPPED | OUTPATIENT
Start: 2025-01-27

## 2025-02-28 ENCOUNTER — MYC REFILL (OUTPATIENT)
Dept: FAMILY MEDICINE | Facility: CLINIC | Age: 35
End: 2025-02-28
Payer: COMMERCIAL

## 2025-02-28 DIAGNOSIS — F41.1 GENERALIZED ANXIETY DISORDER: ICD-10-CM

## 2025-03-02 RX ORDER — LORAZEPAM 0.5 MG/1
0.5 TABLET ORAL EVERY 6 HOURS PRN
Qty: 10 TABLET | Refills: 0 | Status: SHIPPED | OUTPATIENT
Start: 2025-03-02

## 2025-03-17 PROBLEM — J34.0: Status: ACTIVE | Noted: 2025-03-17

## 2025-03-18 ENCOUNTER — PRE VISIT (OUTPATIENT)
Dept: OTOLARYNGOLOGY | Facility: CLINIC | Age: 35
End: 2025-03-18

## 2025-03-18 NOTE — TELEPHONE ENCOUNTER
REFERRAL INFORMATION:  Referring By: SADE MARTINEZ MD,  Referring Clinic: [facility]  Reason for Visit/Diagnosis: New per pt-ref, Diagnosis: Ulcer of mucosa of nose, Nasal polyp, ref'd by SADE MARTINEZ MD, ref-recs in epic, confirmed [facility]      FUTURE VISIT INFORMATION:  Appointment Date: 5/13/25  Appointment Time: 2:40 PM      NOTES STATUS DETAILS   OFFICE NOTE from referring provider Internal  12/23/24- Ov SADE Farris MD @ [facility]

## 2025-03-19 DIAGNOSIS — F41.1 GENERALIZED ANXIETY DISORDER: ICD-10-CM

## 2025-03-19 RX ORDER — PROPRANOLOL HCL 20 MG
20 TABLET ORAL DAILY
Qty: 90 TABLET | Refills: 1 | Status: SHIPPED | OUTPATIENT
Start: 2025-03-19

## 2025-04-10 ENCOUNTER — PATIENT OUTREACH (OUTPATIENT)
Dept: CARE COORDINATION | Facility: CLINIC | Age: 35
End: 2025-04-10
Payer: COMMERCIAL

## 2025-04-24 ENCOUNTER — PATIENT OUTREACH (OUTPATIENT)
Dept: CARE COORDINATION | Facility: CLINIC | Age: 35
End: 2025-04-24
Payer: COMMERCIAL

## 2025-05-13 ENCOUNTER — PRE VISIT (OUTPATIENT)
Dept: OTOLARYNGOLOGY | Facility: CLINIC | Age: 35
End: 2025-05-13

## 2025-05-13 ENCOUNTER — OFFICE VISIT (OUTPATIENT)
Dept: OTOLARYNGOLOGY | Facility: CLINIC | Age: 35
End: 2025-05-13
Payer: COMMERCIAL

## 2025-05-13 VITALS
HEIGHT: 72 IN | HEART RATE: 68 BPM | OXYGEN SATURATION: 98 % | SYSTOLIC BLOOD PRESSURE: 144 MMHG | BODY MASS INDEX: 32.83 KG/M2 | WEIGHT: 242.4 LBS | DIASTOLIC BLOOD PRESSURE: 86 MMHG

## 2025-05-13 DIAGNOSIS — J34.0 ULCER OF MUCOSA OF NOSE: Primary | ICD-10-CM

## 2025-05-13 PROCEDURE — 31231 NASAL ENDOSCOPY DX: CPT | Performed by: STUDENT IN AN ORGANIZED HEALTH CARE EDUCATION/TRAINING PROGRAM

## 2025-05-13 PROCEDURE — 3079F DIAST BP 80-89 MM HG: CPT | Performed by: STUDENT IN AN ORGANIZED HEALTH CARE EDUCATION/TRAINING PROGRAM

## 2025-05-13 PROCEDURE — 99203 OFFICE O/P NEW LOW 30 MIN: CPT | Mod: 25 | Performed by: STUDENT IN AN ORGANIZED HEALTH CARE EDUCATION/TRAINING PROGRAM

## 2025-05-13 PROCEDURE — 1126F AMNT PAIN NOTED NONE PRSNT: CPT | Performed by: STUDENT IN AN ORGANIZED HEALTH CARE EDUCATION/TRAINING PROGRAM

## 2025-05-13 PROCEDURE — 3077F SYST BP >= 140 MM HG: CPT | Performed by: STUDENT IN AN ORGANIZED HEALTH CARE EDUCATION/TRAINING PROGRAM

## 2025-05-13 RX ORDER — MUPIROCIN 20 MG/G
OINTMENT TOPICAL
Qty: 21 G | Refills: 3 | Status: SHIPPED | OUTPATIENT
Start: 2025-05-13 | End: 2025-05-23

## 2025-05-13 ASSESSMENT — PAIN SCALES - GENERAL: PAINLEVEL_OUTOF10: NO PAIN (0)

## 2025-05-13 NOTE — LETTER
5/13/2025       RE: Adam Fish  [address] [facility] [address]     Dear Colleague,    Thank you for referring your patient, Adam Fish, to the [facility] [place] at [facility]. Please see a copy of my visit note below.      [facility] - Rhinology & Skull Base Surgery  New Patient Visit      Encounter date:   May 13, 2025    Referring Provider:  Airam Koroma MD  [address] [place] [id number]    Assessment, Decision Making, and Plan:  (J34.0) Ulcer of mucosa of nose  (primary encounter diagnosis)  Comment:   --chronic right septal ulceration, possible squamous metaplasia  --no improvement with moisturization, previous cautery  --has not tried bactroban  --we discussed initial trial of antibiotic ointment, if no improvement would recommend excision to cartilage in the OR with betancur placement afterwards  --hesitant to use topical steroid in the area for potential risk of perforation  Plan: IMAGESTREAM RECORDING ORDER  --bactroban twice a day x 3 weeks  --follow up 1 month    History of Present Illness:   Adam Fish is a 35 year old male who presents for consultation regarding septal ulceration.    Right septal ulcer  ?polyp  10 years ago following injury  Recurrent scabbing and bleeding  Previously diagnosed with squamous metaplasia  Persistent bleeding  No previous surgery     Physical Exam:  Vital signs: BP (!) 144/86   Pulse 68   Ht 1.829 m (6')   Wt 110 kg (242 lb 6.4 oz)   SpO2 98%   BMI 32.88 kg/m     General Appearance: No acute distress, appropriate demeanor, conversant  Eyes: moist conjunctivae; EOMI; pupils symmetric; visual acuity grossly intact; no proptosis  Head: normocephalic; overall symmetric appearance without deformity  Face: overall symmetric without deformity  Ears: Normal appearance of external ear; external meatus normal in appearance; TMs intact without  perforation bilaterally;   Nose: No external deformity; septum (broad ulceration with heaped edges on the right septum, QC palpably intact, mucosa intact on the left) ; inferior turbinates (non obstructing)   Oral Cavity/oropharynx: Normal appearance of mucosa; tongue midline; no mass or lesions; oropharynx without obvious mucosal abnormality  Neck: no palpable lymphadenopathy; thyroid without palpable nodules  Lungs: symmetric chest rise; no wheezing  CV: Good distal perfusion; normal hear rate  Extremities: No deformity  Neurologic Exam: Cranial nerves II-XII are grossly intact; no focal deficit    Procedure Note  Procedure performed: Rigid nasal endoscopy  Indication: To evaluate for sinonasal pathology not visualized on routine anterior rhinoscopy  Anesthesia: 4% topical lidocaine with 0.05% oxymetazoline  Description of procedure: A 0-degree, 4 mm rigid endoscope was inserted into bilateral nasal cavities and the inferior and middle turbinates, nasal valves, nasal cavity, inferior meatus, middle meatus, sphenoethmoid recess, and nasopharynx were thoroughly evaluated for evidence of obstruction, edema, purulence, polyps and/or mass/lesion.     Findings:    MM SER NP clear  No polyps    The patient tolerated the procedure well without complication.     Terrence Jeffries MD    Rhinology  Endoscopic Skull Base Surgery  [facility]  Department of Otolaryngology - Head & Neck Surgery          Again, thank you for allowing me to participate in the care of your patient.      Sincerely,    Terrence Jeffries MD

## 2025-05-13 NOTE — PROGRESS NOTES
[facility] - Rhinology & Skull Base Surgery  New Patient Visit      Encounter date:   May 13, 2025    Referring Provider:  Airam Koroma MD  [address] [place] [id number]    Assessment, Decision Making, and Plan:  (J34.0) Ulcer of mucosa of nose  (primary encounter diagnosis)  Comment:   --chronic right septal ulceration, possible squamous metaplasia  --no improvement with moisturization, previous cautery  --has not tried bactroban  --we discussed initial trial of antibiotic ointment, if no improvement would recommend excision to cartilage in the OR with betancur placement afterwards  --hesitant to use topical steroid in the area for potential risk of perforation  Plan: IMAGESTREAM RECORDING ORDER  --bactroban twice a day x 3 weeks  --follow up 1 month    History of Present Illness:   Adam Fish is a 35 year old male who presents for consultation regarding septal ulceration.    Right septal ulcer  ?polyp  10 years ago following injury  Recurrent scabbing and bleeding  Previously diagnosed with squamous metaplasia  Persistent bleeding  No previous surgery     Physical Exam:  Vital signs: BP (!) 144/86   Pulse 68   Ht 1.829 m (6')   Wt 110 kg (242 lb 6.4 oz)   SpO2 98%   BMI 32.88 kg/m     General Appearance: No acute distress, appropriate demeanor, conversant  Eyes: moist conjunctivae; EOMI; pupils symmetric; visual acuity grossly intact; no proptosis  Head: normocephalic; overall symmetric appearance without deformity  Face: overall symmetric without deformity  Ears: Normal appearance of external ear; external meatus normal in appearance; TMs intact without perforation bilaterally;   Nose: No external deformity; septum (broad ulceration with heaped edges on the right septum, QC palpably intact, mucosa intact on the left) ; inferior turbinates (non obstructing)   Oral Cavity/oropharynx: Normal appearance of mucosa; tongue midline; no mass or lesions; oropharynx without obvious  mucosal abnormality  Neck: no palpable lymphadenopathy; thyroid without palpable nodules  Lungs: symmetric chest rise; no wheezing  CV: Good distal perfusion; normal hear rate  Extremities: No deformity  Neurologic Exam: Cranial nerves II-XII are grossly intact; no focal deficit    Procedure Note  Procedure performed: Rigid nasal endoscopy  Indication: To evaluate for sinonasal pathology not visualized on routine anterior rhinoscopy  Anesthesia: 4% topical lidocaine with 0.05% oxymetazoline  Description of procedure: A 0-degree, 4 mm rigid endoscope was inserted into bilateral nasal cavities and the inferior and middle turbinates, nasal valves, nasal cavity, inferior meatus, middle meatus, sphenoethmoid recess, and nasopharynx were thoroughly evaluated for evidence of obstruction, edema, purulence, polyps and/or mass/lesion.     Findings:    MM SER NP clear  No polyps    The patient tolerated the procedure well without complication.     Terrence Jeffries MD    Rhinology  Endoscopic Skull Base Surgery  [facility]  Department of Otolaryngology - Head & Neck Surgery

## 2025-05-13 NOTE — PATIENT INSTRUCTIONS
You were seen in the ENT Clinic today by Dr. Jeffries. If you have any questions or concerns after your appointment, please contact us (see below)    The following has been recommended for you based upon your appointment today:  Apply Bactroban ointment to the inside of your nose as directed for 3 weeks.    Please return to clinic in 4 weeks.    How to Contact Us:  Send a [employer] message to your provider. Our team will respond to you via [employer]. Occasionally, we will need to call you to get further information.  For urgent matters (Monday-Friday), call the ENT Clinic: [phone] and speak with a call center team member - they will route your call appropriately.   If you'd like to speak directly with a nurse, please find our contact information below. We do our best to check voicemail frequently throughout the day, and will work to call you back within 1-2 days. For urgent matters, please use the general clinic phone numbers listed above.    Meli CRAIG RN, BSN   RN Care Coordinator, ENT Clinic  [facility]  Direct: [phone]  Mara HAGAN LPN  Direct: [phone]

## 2025-05-25 ENCOUNTER — MYC REFILL (OUTPATIENT)
Dept: FAMILY MEDICINE | Facility: CLINIC | Age: 35
End: 2025-05-25
Payer: COMMERCIAL

## 2025-05-25 DIAGNOSIS — R11.0 NAUSEA: ICD-10-CM

## 2025-05-25 DIAGNOSIS — F41.1 GENERALIZED ANXIETY DISORDER: ICD-10-CM

## 2025-05-27 RX ORDER — LORAZEPAM 0.5 MG/1
0.5 TABLET ORAL EVERY 6 HOURS PRN
Qty: 10 TABLET | Refills: 0 | Status: SHIPPED | OUTPATIENT
Start: 2025-05-27

## 2025-05-27 RX ORDER — BUSPIRONE HYDROCHLORIDE 30 MG/1
30 TABLET ORAL 2 TIMES DAILY
Qty: 60 TABLET | Refills: 0 | Status: SHIPPED | OUTPATIENT
Start: 2025-05-27 | End: 2025-06-26

## 2025-05-27 RX ORDER — ONDANSETRON 4 MG/1
4 TABLET, ORALLY DISINTEGRATING ORAL EVERY 8 HOURS PRN
Qty: 16 TABLET | Refills: 1 | Status: SHIPPED | OUTPATIENT
Start: 2025-05-27

## 2025-06-14 ENCOUNTER — HEALTH MAINTENANCE LETTER (OUTPATIENT)
Age: 35
End: 2025-06-14

## 2025-06-24 DIAGNOSIS — F41.1 GENERALIZED ANXIETY DISORDER: ICD-10-CM

## 2025-06-24 RX ORDER — BUSPIRONE HYDROCHLORIDE 30 MG/1
30 TABLET ORAL 2 TIMES DAILY
Qty: 180 TABLET | Refills: 0 | Status: SHIPPED | OUTPATIENT
Start: 2025-06-24

## 2025-07-24 ENCOUNTER — MYC REFILL (OUTPATIENT)
Dept: FAMILY MEDICINE | Facility: CLINIC | Age: 35
End: 2025-07-24
Payer: COMMERCIAL

## 2025-07-24 DIAGNOSIS — F41.1 GENERALIZED ANXIETY DISORDER: ICD-10-CM

## 2025-07-24 RX ORDER — LORAZEPAM 0.5 MG/1
0.5 TABLET ORAL DAILY PRN
Qty: 10 TABLET | Refills: 0 | Status: SHIPPED | OUTPATIENT
Start: 2025-07-24

## (undated) RX ORDER — LIDOCAINE HYDROCHLORIDE 10 MG/ML
INJECTION, SOLUTION EPIDURAL; INFILTRATION; INTRACAUDAL; PERINEURAL
Status: DISPENSED
Start: 2019-02-04

## (undated) RX ORDER — DEXAMETHASONE SODIUM PHOSPHATE 10 MG/ML
INJECTION, SOLUTION INTRAMUSCULAR; INTRAVENOUS
Status: DISPENSED
Start: 2019-02-04